# Patient Record
Sex: FEMALE | Race: WHITE | NOT HISPANIC OR LATINO | Employment: OTHER | URBAN - METROPOLITAN AREA
[De-identification: names, ages, dates, MRNs, and addresses within clinical notes are randomized per-mention and may not be internally consistent; named-entity substitution may affect disease eponyms.]

---

## 2017-02-15 ENCOUNTER — HOSPITAL ENCOUNTER (OUTPATIENT)
Dept: RADIOLOGY | Facility: MEDICAL CENTER | Age: 62
End: 2017-02-15
Attending: FAMILY MEDICINE
Payer: MEDICARE

## 2017-02-15 DIAGNOSIS — R13.10 DYSPHAGIA, UNSPECIFIED TYPE: ICD-10-CM

## 2017-02-15 PROCEDURE — 92611 MOTION FLUOROSCOPY/SWALLOW: CPT

## 2017-02-15 PROCEDURE — G8997 SWALLOW GOAL STATUS: HCPCS | Mod: CH

## 2017-02-15 PROCEDURE — 74230 X-RAY XM SWLNG FUNCJ C+: CPT

## 2017-02-15 PROCEDURE — G8998 SWALLOW D/C STATUS: HCPCS | Mod: CH

## 2017-02-15 PROCEDURE — G8996 SWALLOW CURRENT STATUS: HCPCS | Mod: CH

## 2017-02-28 ENCOUNTER — HOSPITAL ENCOUNTER (OUTPATIENT)
Dept: LAB | Facility: MEDICAL CENTER | Age: 62
End: 2017-02-28
Attending: FAMILY MEDICINE
Payer: MEDICARE

## 2017-02-28 LAB
25(OH)D3 SERPL-MCNC: 16 NG/ML (ref 30–100)
ALBUMIN SERPL BCP-MCNC: 3.9 G/DL (ref 3.2–4.9)
ALBUMIN/GLOB SERPL: 1.1 G/DL
ALP SERPL-CCNC: 90 U/L (ref 30–99)
ALT SERPL-CCNC: 18 U/L (ref 2–50)
ANION GAP SERPL CALC-SCNC: 7 MMOL/L (ref 0–11.9)
AST SERPL-CCNC: 11 U/L (ref 12–45)
BASOPHILS # BLD AUTO: 0.8 % (ref 0–1.8)
BASOPHILS # BLD: 0.06 K/UL (ref 0–0.12)
BILIRUB SERPL-MCNC: 0.4 MG/DL (ref 0.1–1.5)
BUN SERPL-MCNC: 10 MG/DL (ref 8–22)
CALCIUM SERPL-MCNC: 9.3 MG/DL (ref 8.4–10.2)
CHLORIDE SERPL-SCNC: 104 MMOL/L (ref 96–112)
CHOLEST SERPL-MCNC: 222 MG/DL (ref 100–199)
CO2 SERPL-SCNC: 29 MMOL/L (ref 20–33)
CREAT SERPL-MCNC: 0.85 MG/DL (ref 0.5–1.4)
EOSINOPHIL # BLD AUTO: 0.23 K/UL (ref 0–0.51)
EOSINOPHIL NFR BLD: 2.9 % (ref 0–6.9)
ERYTHROCYTE [DISTWIDTH] IN BLOOD BY AUTOMATED COUNT: 44.6 FL (ref 35.9–50)
GFR SERPL CREATININE-BSD FRML MDRD: >60 ML/MIN/1.73 M 2
GLOBULIN SER CALC-MCNC: 3.6 G/DL (ref 1.9–3.5)
GLUCOSE SERPL-MCNC: 127 MG/DL (ref 65–99)
HCT VFR BLD AUTO: 46.2 % (ref 37–47)
HDLC SERPL-MCNC: 46 MG/DL
HGB BLD-MCNC: 15.2 G/DL (ref 12–16)
IMM GRANULOCYTES # BLD AUTO: 0.02 K/UL (ref 0–0.11)
IMM GRANULOCYTES NFR BLD AUTO: 0.3 % (ref 0–0.9)
LDLC SERPL CALC-MCNC: 142 MG/DL
LYMPHOCYTES # BLD AUTO: 2.09 K/UL (ref 1–4.8)
LYMPHOCYTES NFR BLD: 26.4 % (ref 22–41)
MCH RBC QN AUTO: 30.2 PG (ref 27–33)
MCHC RBC AUTO-ENTMCNC: 32.9 G/DL (ref 33.6–35)
MCV RBC AUTO: 91.7 FL (ref 81.4–97.8)
MONOCYTES # BLD AUTO: 0.4 K/UL (ref 0–0.85)
MONOCYTES NFR BLD AUTO: 5.1 % (ref 0–13.4)
NEUTROPHILS # BLD AUTO: 5.11 K/UL (ref 2–7.15)
NEUTROPHILS NFR BLD: 64.5 % (ref 44–72)
NRBC # BLD AUTO: 0 K/UL
NRBC BLD AUTO-RTO: 0 /100 WBC
PLATELET # BLD AUTO: 308 K/UL (ref 164–446)
PMV BLD AUTO: 9.5 FL (ref 9–12.9)
POTASSIUM SERPL-SCNC: 4.3 MMOL/L (ref 3.6–5.5)
PROT SERPL-MCNC: 7.5 G/DL (ref 6–8.2)
RBC # BLD AUTO: 5.04 M/UL (ref 4.2–5.4)
SODIUM SERPL-SCNC: 140 MMOL/L (ref 135–145)
T3FREE SERPL-MCNC: 3.34 PG/ML (ref 2.4–4.2)
T4 FREE SERPL-MCNC: 1.41 NG/DL (ref 0.58–1.64)
TRIGL SERPL-MCNC: 168 MG/DL (ref 0–149)
TSH SERPL DL<=0.005 MIU/L-ACNC: 0.22 UIU/ML (ref 0.35–5.5)
WBC # BLD AUTO: 7.9 K/UL (ref 4.8–10.8)

## 2017-02-28 PROCEDURE — 84439 ASSAY OF FREE THYROXINE: CPT

## 2017-02-28 PROCEDURE — 82306 VITAMIN D 25 HYDROXY: CPT | Mod: GA

## 2017-02-28 PROCEDURE — 84443 ASSAY THYROID STIM HORMONE: CPT

## 2017-02-28 PROCEDURE — 80061 LIPID PANEL: CPT

## 2017-02-28 PROCEDURE — 85025 COMPLETE CBC W/AUTO DIFF WBC: CPT

## 2017-02-28 PROCEDURE — 84481 FREE ASSAY (FT-3): CPT

## 2017-02-28 PROCEDURE — 83036 HEMOGLOBIN GLYCOSYLATED A1C: CPT

## 2017-02-28 PROCEDURE — 80053 COMPREHEN METABOLIC PANEL: CPT

## 2017-02-28 PROCEDURE — 36415 COLL VENOUS BLD VENIPUNCTURE: CPT

## 2017-03-01 LAB
EST. AVERAGE GLUCOSE BLD GHB EST-MCNC: 123 MG/DL
HBA1C MFR BLD: 5.9 % (ref 0–5.6)

## 2017-07-06 PROBLEM — E03.9 HYPOTHYROIDISM: Status: ACTIVE | Noted: 2017-07-06

## 2017-07-06 PROBLEM — E78.5 DYSLIPIDEMIA: Status: ACTIVE | Noted: 2017-07-06

## 2017-07-06 PROBLEM — G35 MS (MULTIPLE SCLEROSIS) (HCC): Status: ACTIVE | Noted: 2017-07-06

## 2017-07-06 PROBLEM — E55.9 VITAMIN D DEFICIENCY: Status: ACTIVE | Noted: 2017-07-06

## 2017-09-25 ENCOUNTER — OFFICE VISIT (OUTPATIENT)
Dept: NEUROLOGY | Facility: MEDICAL CENTER | Age: 62
End: 2017-09-25
Payer: MEDICARE

## 2017-09-25 VITALS
HEART RATE: 95 BPM | RESPIRATION RATE: 16 BRPM | HEIGHT: 63 IN | SYSTOLIC BLOOD PRESSURE: 124 MMHG | BODY MASS INDEX: 51.91 KG/M2 | TEMPERATURE: 97.3 F | OXYGEN SATURATION: 100 % | DIASTOLIC BLOOD PRESSURE: 86 MMHG | WEIGHT: 293 LBS

## 2017-09-25 DIAGNOSIS — R25.2 SPASTICITY: ICD-10-CM

## 2017-09-25 DIAGNOSIS — R26.81 GAIT INSTABILITY: ICD-10-CM

## 2017-09-25 DIAGNOSIS — G35 MULTIPLE SCLEROSIS (HCC): ICD-10-CM

## 2017-09-25 DIAGNOSIS — G89.29 OTHER CHRONIC PAIN: ICD-10-CM

## 2017-09-25 PROBLEM — N31.9 NEUROGENIC BLADDER: Status: ACTIVE | Noted: 2017-09-25

## 2017-09-25 PROCEDURE — 99205 OFFICE O/P NEW HI 60 MIN: CPT | Performed by: PSYCHIATRY & NEUROLOGY

## 2017-09-25 RX ORDER — METOPROLOL SUCCINATE 25 MG/1
TABLET, EXTENDED RELEASE ORAL
COMMUNITY
Start: 2017-09-18 | End: 2018-05-02

## 2017-09-25 RX ORDER — OXYCODONE AND ACETAMINOPHEN 10; 325 MG/1; MG/1
1-2 TABLET ORAL EVERY 4 HOURS PRN
Qty: 28 TAB | Refills: 0 | Status: SHIPPED | OUTPATIENT
Start: 2017-09-25 | End: 2017-10-23 | Stop reason: SDUPTHER

## 2017-09-25 RX ORDER — DIAZEPAM 10 MG/1
10 TABLET ORAL 2 TIMES DAILY PRN
Qty: 60 TAB | Refills: 1 | Status: SHIPPED | OUTPATIENT
Start: 2017-09-25 | End: 2018-01-18 | Stop reason: SDUPTHER

## 2017-09-25 RX ORDER — DIAZEPAM 10 MG/1
10 TABLET ORAL 2 TIMES DAILY PRN
Refills: 1 | COMMUNITY
Start: 2017-09-12 | End: 2017-09-25 | Stop reason: SDUPTHER

## 2017-09-25 RX ORDER — LEVOTHYROXINE SODIUM 0.15 MG/1
TABLET ORAL
Refills: 6 | COMMUNITY
Start: 2017-09-10 | End: 2019-01-31

## 2017-09-25 RX ORDER — ATORVASTATIN CALCIUM 10 MG/1
TABLET, FILM COATED ORAL
Refills: 5 | COMMUNITY
Start: 2017-09-10 | End: 2018-05-02

## 2017-09-25 RX ORDER — CARISOPRODOL 350 MG/1
TABLET ORAL
Refills: 0 | COMMUNITY
Start: 2017-08-13 | End: 2017-09-25 | Stop reason: SDUPTHER

## 2017-09-25 RX ORDER — CARISOPRODOL 350 MG/1
TABLET ORAL
Qty: 30 TAB | Refills: 0 | Status: SHIPPED | OUTPATIENT
Start: 2017-09-25 | End: 2017-10-23 | Stop reason: SDUPTHER

## 2017-09-25 ASSESSMENT — PATIENT HEALTH QUESTIONNAIRE - PHQ9
5. POOR APPETITE OR OVEREATING: 3 - NEARLY EVERY DAY
CLINICAL INTERPRETATION OF PHQ2 SCORE: 6
SUM OF ALL RESPONSES TO PHQ QUESTIONS 1-9: 24

## 2017-09-25 NOTE — PROGRESS NOTES
"CC: Multiple Sclerosis       HPI:    Ms. Richardson is a 61-year-old woman with multiple sclerosis who presents today for initial consultation and to establish care. She was referred by her primary care physician Dr. Luci Aguilera.    In 1991, Ms. Richardson tells me she was living in [facility] when she started having double vision. She went to her PCP at that time who ordered an MRI which showed plaques in her brain concerning for multiple sclerosis. She did not follow up with a neurologist until 1995 when experienced an episode of transverse myelitis. She went numb from the armpits down, had bladder and bowel problems, and became paraplegic. She was hospitalized for 2 weeks and was treated by Dr. Dixon James with oral prednisone. She was told she would never walk again, but she did regain the use of her legs. She had additional relapses over the ensuing years, and in 1999, she moved to [place], [place]. Her neurologist there repeated MRI's, establishing again her diagnosis of MS, and started her on Betaseron. The injections were extremely painful for her and she also had suicidal ideations. She ultimately tried Avonex and Copaxone as well and was unable to tolerate these. She also tells me that she had depressive effects from several symptomatic medications. For example asked within meet her extremely dizzy which compounded an episode of vertigo that she had.    She moved to [place], [place], in 2011 after retiring and also to be closer to her parents. She had a bad exacerbation shortly after moving, and started seeing a PA. She tells me the exacerbation felt like she had a \"rock in her rectum,\" similar to what it felt like during her episode in 1995. She was also having severe shivering. She could not walk, but her symptoms improved after 2 weeks. She did not have medical insurance at the time. Her PA prescribed her Ritalin which helped with cognitive issues. They also treated her for an episode of pneumonia earlier this " year.     Previously she had pins and needles residual from her transverse myelitis in her arms and legs. This has transformed into a searing burning sensation which is constant, present every day, and seems to be untouched by medications that she has tried in the past. She has been tried on a variety of medications including antidepressants which gave her suicidal ideations, and anti-epileptic medications which were ineffective. She had been medicating herself with occasional Percocet which would take the edge off. She also continues to experience significant spasticity particularly around her rib cage. She tried smoking marijuana after someone had encouraged her to do so but she experienced some bad side effects after the 3rd or 4th time and so she has not tried it again.    She is here today because she feels as though she's been getting worse with new neurological symptoms. She has constant double vision and has noticed cognitive changes. Sometimes she feels like she is hearing what her  is saying backwards. She has had jumbled speech. She has also been having swallowing issues and had a swallow study that was normal. She has choked twice, and her  had to perform the Heimlich maneuver.    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 Smoked about a pack a day x 10 years. No alcohol.     PMHx: Graves' s/p radioactive iodone ablation.     PSHx: Hysterectomy in 2008 due to menorrhagia. Cholecystectomy. Tonsillectomy.     Family Hx: 41 yo Daughter has MS. 34 yo daughter has fibromyalgia. Mom and dad both healthy. Two brother both healthy.     MS History:  Diagnosed: 1995  First presentation: 1991 Double Vision  Disease modifying treatments: Betaseron (had suicidal ideations). Copaxone. Avonex.   Former or other neurologist:   Last attack: Recently  Last MRI:  Unsure - [place], [place]      ROS:   Constitutional: No fevers or chills.  Eyes: No blurry vision or  eye pain.  ENT: + dysphagia, no hearing loss.  Respiratory: No cough or shortness of breath.  Cardiovascular: No chest pain or palpitations.  GI: No nausea, vomiting, or diarrhea.  : No urinary incontinence or dysuria.  Musculoskeletal: No joint swelling or arthralgias.  Skin: No skin rashes.  Neuro: No headaches, dizziness, or tremors.  Endocrine: No heat or cold intolerance. No polydipsia or polyuria.  Psych: No depression or anxiety.  Heme/Lymph: No easy bruising or swollen lymph nodes.  All other review of systems were reviewed and were negative.     Social History:   Social History     Social History   • Marital status:      Spouse name: N/A   • Number of children: N/A   • Years of education: N/A     Occupational History   • Not on file.     Social History Main Topics   • Smoking status: Former Smoker     Years: 15.00     Quit date: 2014   • Smokeless tobacco: Never Used   • Alcohol use No   • Drug use: No   • Sexual activity: Not on file     Other Topics Concern   • Not on file     Social History Narrative   • No narrative on file       Family Hx:   Family History   Problem Relation Age of Onset   • Autoimmune Disease Daughter      MS   • Other Daughter      fibromyalgia       Current Medications:     Current Outpatient Prescriptions:   •  levothyroxine (SYNTHROID) 150 MCG Tab, TAKE 1 TABLET(S) BY MOUTH DAILY, Disp: , Rfl: 6  •  diazepam (VALIUM) 10 MG tablet, Take 1 Tab by mouth 2 times a day as needed., Disp: 60 Tab, Rfl: 1  •  carisoprodol (SOMA) 350 MG Tab, TAKE 1 TABLET BY MOUTH TWICE A DAY AS NEEDED FOR SPASM, Disp: 30 Tab, Rfl: 0  •  oxycodone-acetaminophen (PERCOCET-10)  MG Tab, Take 1-2 Tabs by mouth every four hours as needed for Severe Pain., Disp: 28 Tab, Rfl: 0  •  atorvastatin (LIPITOR) 10 MG Tab, TAKE 1 TABLET ORALLY EVERY EVENING FOR CHOLESTEROL, Disp: , Rfl: 5  •  metoprolol SR (TOPROL XL) 25 MG TABLET SR 24 HR, , Disp: , Rfl:   •  metoprolol (LOPRESSOR) 25 MG Tab, Take 25 mg by  "mouth 2 times a day., Disp: , Rfl: 3      Allergies: No Known Allergies      Physical Exam:   Ambulatory Vitals  Vitals  Blood Pressure: 124/86  Temperature: 36.3 °C (97.3 °F)  Pulse: 95  Respiration: 16  Pulse Oximetry: 100 %  Height: 160 cm (5' 3\")  Weight: (!) 135.2 kg (298 lb)  Encounter Vitals  Temperature: 36.3 °C (97.3 °F)  Temp Source: Temporal  Blood Pressure: 124/86  BP Location: Left, Forearm  Patient BP Position: Sitting  Pulse: 95  Respiration: 16  Pulse Oximetry: 100 %  Weight: (!) 135.2 kg (298 lb)  How Weight Obtained: Stand Up Scale  Height: 160 cm (5' 3\")  BMI (Calculated): 52.79      Constitutional: Well-developed, well-nourished overweight woman. Appears stated age.  Cardiovascular: RRR, with no murmurs, rubs or gallops. No carotid bruits. No peripheral edema, pedal pulses intact.   Respiratory: Lungs CTA B/L, no W/R/R.   Skin: Warm, dry, intact. No rashes observed.  Eyes:    Funduscopic: Optic discs flat with no evidence of papilledema or pallor. Normal posterior segments.  Neurologic:   Mental Status: Awake, alert, oriented x 3.   Speech: Fluent with normal prosody.   Concentration: Attentive. Able to focus on history and follow multi-step commands.   Fund of Knowledge: Appropriate.   Cranial Nerves: PERRL. EOM sadi by broken saccades. Good eye closure. Facial sensation decreased on the left compared to the right. Palate elevates symmetrically. Tongue midline. Symmetric shoulder shrug.   Sensory: Decreased vibration to the level of the upper extremities bilaterally. Temperature decreased on the left compared to the right.   Coordination: No evidence of past-pointing on finger to nose testing.   DTR's: 3+ in bilateral biceps, triceps. Unable to elicit in right knee (prior arthroplasty), 1+ in left knee, 1+ in ankles bilaterally.    Babinski: Toes mute bilaterally.   Romberg: Untested - patient too unsteady.    Movements: No tremors observed.   Musculoskeletal:    Strength: Right hip flexor 2/5, " left hip flexor 3/5. Right knee flexor/extensor 3/5, left 2/5. Left foot drop. Right ankle DF/PF 4/5.    Gait: Broad based and unsteady. Had difficulty transferring from chair to exam table.   Tone: Normal bulk and tone.   Joints: No swelling.       Imaging:   None available for review at today's visit.    Multiple sclerosis ([facility])  Mrs. Richardson's clinical history is consistent with multiple sclerosis. She has had what sounds like clinical attacks in her brainstem as well as at least one episode of transverse myelitis. She did not have MRI imaging available for review today. Given the recent worsening of her symptoms I would like to repeat her imaging of the brain, cervical and thoracic spines with and without contrast. This will help us confirm her diagnosis and also give some indication of the extent of her recent disease activity. This will help us inform her treatment choices.    Plan:  1. MRI of the brain, cervical, and thoracic spines with and without contrast  2. She will obtain a copy of her previous MRI from [facility] for review and comparison.    Gait instability  Ms. Richardson has had a significant worsening in her walking ability over the last several years. She has had multiple falls and her  has stepped in to assist her with several of her activities of daily living including bathing and dressing. She lives in a one-story house which has 3 steps to get up into the house. She has grab bars in her shower, but is unable to shower on her own because she is so unsteady. She has refused to use assistive devices in the past although she tried walking sticks but felt her coordination with the sticks was more dangerous than walking without them. Today we discussed using a rollator walker for stability and she agreed to try this. Her lack of mobility has also restricted her movement from house which is also had an impact on her psychosocial well-being. I would like her to also have a transport  chair which she can use when she goes out in public. At a future visit I would like to discuss starting Ampyra.    Plan:  1. Prescription given for Rollator walker  2. Prescription given for transport wheelchair  3. Social work consult placed for home safety evaluation. I would like her physical house to be evaluated and assessed for potential improvements that could minimize her fall risk.    Chronic pain  We had a long discussion about her chronic neuropathic pain which is residual from her transverse myelitis. She's tried many different medications all of which give her unwanted side effects. Antidepressant medications give her suicidal ideations. She has never had suicidal ideations off of these medications. She has also been tried on many antiepileptic medications which also have given her unwanted side effects. She requested a limited supply of Percocet, which I agreed to as a 1 time Bridge to her next visit at which we will further discuss treatment options versus pain management referral.    Plan:  1. Percocet 10/325 #28 one time prescription given at today's visit.    Spasticity  We discussed the patient's spasticity at length. Baclofen made her extremely dizzy. She has been on Soma for a number of years and would like to stay on this medication for the time being. I agreed to take over her prescription of this muscle relaxant as well as her Valium. Prescription refills were given for each.    Plan:  1. Soma #30 prescribed at today's visit. She takes 2 per day so we may have to refill this at her next visit.   2. Valium 10 mg twice a day #60 prescribed at today's visit.          Follow up      Greater than 50% of this 60 minute face to face encounter was devoted to disease state counseling on Multiple Sclerosis and coordination of care. During today's encounter we discussed at length her symptom management. We also addressed at length her recent worsening in walking, frequent falls, strategies for reducing  falls, and assistive devices which were prescribed at today's visit (see above assessment and plan for further details of discussion).

## 2017-09-26 ENCOUNTER — PATIENT OUTREACH (OUTPATIENT)
Dept: HEALTH INFORMATION MANAGEMENT | Facility: OTHER | Age: 62
End: 2017-09-26

## 2017-09-26 ENCOUNTER — TELEPHONE (OUTPATIENT)
Dept: HEALTH INFORMATION MANAGEMENT | Facility: OTHER | Age: 62
End: 2017-09-26

## 2017-09-26 NOTE — ASSESSMENT & PLAN NOTE
We discussed the patient's spasticity at length. Baclofen made her extremely dizzy. She has been on Soma for a number of years and would like to stay on this medication for the time being. I agreed to take over her prescription of this muscle relaxant as well as her Valium. Prescription refills were given for each.    Plan:  1. Soma #30 prescribed at today's visit. She takes 2 per day so we may have to refill this at her next visit.   2. Valium 10 mg twice a day #60 prescribed at today's visit.

## 2017-09-26 NOTE — ASSESSMENT & PLAN NOTE
Ms. Richardson has had a significant worsening in her walking ability over the last several years. She has had multiple falls and her  has stepped in to assist her with several of her activities of daily living including bathing and dressing. She lives in a one-story house which has 3 steps to get up into the house. She has grab bars in her shower, but is unable to shower on her own because she is so unsteady. She has refused to use assistive devices in the past although she tried walking sticks but felt her coordination with the sticks was more dangerous than walking without them. Today we discussed using a rollator walker for stability and she agreed to try this. Her lack of mobility has also restricted her movement from house which is also had an impact on her psychosocial well-being. I would like her to also have a transport chair which she can use when she goes out in public. At a future visit I would like to discuss starting Ampyra.    Plan:  1. Prescription given for Rollator walker  2. Prescription given for transport wheelchair  3. Social work consult placed for home safety evaluation. I would like her physical house to be evaluated and assessed for potential improvements that could minimize her fall risk.

## 2017-09-26 NOTE — TELEPHONE ENCOUNTER
Dr. Mejias,     Thank you for your referral to Care Coordination. At this time, we are not in contract with her insurance to follow her care. A letter was sent to her with a list of resources for community programs that can assist. Do you believe that this patient would benefit from being placed on Home Health? They would be able to do an evaluation of her home and be able to assist her in the home as well. Please do not hesitate to call if you have questions.     Kerry Robbins MA   Care Coordination  [phone]

## 2017-09-26 NOTE — ASSESSMENT & PLAN NOTE
We had a long discussion about her chronic neuropathic pain which is residual from her transverse myelitis. She's tried many different medications all of which give her unwanted side effects. Antidepressant medications give her suicidal ideations. She has never had suicidal ideations off of these medications. She has also been tried on many antiepileptic medications which also have given her unwanted side effects. She requested a limited supply of Percocet, which I agreed to as a 1 time Bridge to her next visit at which we will further discuss treatment options versus pain management referral.    Plan:  1. Percocet 10/325 #28 one time prescription given at today's visit.

## 2017-09-26 NOTE — ASSESSMENT & PLAN NOTE
Mrs. Richardson's clinical history is consistent with multiple sclerosis. She has had what sounds like clinical attacks in her brainstem as well as at least one episode of transverse myelitis. She did not have MRI imaging available for review today. Given the recent worsening of her symptoms I would like to repeat her imaging of the brain, cervical and thoracic spines with and without contrast. This will help us confirm her diagnosis and also give some indication of the extent of her recent disease activity. This will help us inform her treatment choices.    Plan:  1. MRI of the brain, cervical, and thoracic spines with and without contrast  2. She will obtain a copy of her previous MRI from [facility] [place] for review and comparison.

## 2017-10-06 ENCOUNTER — HOSPITAL ENCOUNTER (OUTPATIENT)
Dept: RADIOLOGY | Facility: MEDICAL CENTER | Age: 62
End: 2017-10-06
Attending: PSYCHIATRY & NEUROLOGY
Payer: MEDICARE

## 2017-10-06 DIAGNOSIS — G35 MULTIPLE SCLEROSIS (HCC): ICD-10-CM

## 2017-10-06 PROCEDURE — A9579 GAD-BASE MR CONTRAST NOS,1ML: HCPCS | Performed by: PSYCHIATRY & NEUROLOGY

## 2017-10-06 PROCEDURE — 70553 MRI BRAIN STEM W/O & W/DYE: CPT

## 2017-10-06 PROCEDURE — 700117 HCHG RX CONTRAST REV CODE 255: Performed by: PSYCHIATRY & NEUROLOGY

## 2017-10-06 PROCEDURE — 72157 MRI CHEST SPINE W/O & W/DYE: CPT

## 2017-10-06 PROCEDURE — 72156 MRI NECK SPINE W/O & W/DYE: CPT

## 2017-10-06 RX ADMIN — GADODIAMIDE 20 ML: 287 INJECTION INTRAVENOUS at 17:00

## 2017-10-23 ENCOUNTER — OFFICE VISIT (OUTPATIENT)
Dept: NEUROLOGY | Facility: MEDICAL CENTER | Age: 62
End: 2017-10-23
Payer: MEDICARE

## 2017-10-23 VITALS
DIASTOLIC BLOOD PRESSURE: 82 MMHG | RESPIRATION RATE: 16 BRPM | SYSTOLIC BLOOD PRESSURE: 126 MMHG | OXYGEN SATURATION: 94 % | HEIGHT: 63 IN | BODY MASS INDEX: 51.91 KG/M2 | WEIGHT: 293 LBS | HEART RATE: 96 BPM | TEMPERATURE: 98.4 F

## 2017-10-23 DIAGNOSIS — G35 MULTIPLE SCLEROSIS (HCC): ICD-10-CM

## 2017-10-23 DIAGNOSIS — R25.2 SPASTICITY: ICD-10-CM

## 2017-10-23 DIAGNOSIS — Z91.030 ALLERGY TO BEE STING: ICD-10-CM

## 2017-10-23 DIAGNOSIS — G89.29 OTHER CHRONIC PAIN: ICD-10-CM

## 2017-10-23 PROCEDURE — 99215 OFFICE O/P EST HI 40 MIN: CPT | Performed by: PSYCHIATRY & NEUROLOGY

## 2017-10-23 RX ORDER — OXYCODONE AND ACETAMINOPHEN 10; 325 MG/1; MG/1
1-2 TABLET ORAL EVERY 4 HOURS PRN
Qty: 28 TAB | Refills: 0 | Status: SHIPPED | OUTPATIENT
Start: 2017-10-23 | End: 2017-12-18 | Stop reason: SDUPTHER

## 2017-10-23 RX ORDER — CARISOPRODOL 350 MG/1
TABLET ORAL
Qty: 30 TAB | Refills: 0 | Status: SHIPPED | OUTPATIENT
Start: 2017-10-23 | End: 2018-01-18 | Stop reason: SDUPTHER

## 2017-10-23 RX ORDER — EPINEPHRINE 0.3 MG/.3ML
0.3 INJECTION SUBCUTANEOUS ONCE
Qty: 0.3 ML | Refills: 0 | Status: SHIPPED | OUTPATIENT
Start: 2017-10-23 | End: 2017-10-23

## 2017-10-23 ASSESSMENT — PATIENT HEALTH QUESTIONNAIRE - PHQ9
5. POOR APPETITE OR OVEREATING: 3 - NEARLY EVERY DAY
SUM OF ALL RESPONSES TO PHQ QUESTIONS 1-9: 20
CLINICAL INTERPRETATION OF PHQ2 SCORE: 5

## 2017-10-23 NOTE — PROGRESS NOTES
CC: Multiple Sclerosis       HPI:    Ms. Richardson is a 62-year-old woman with hypothyroidism, hypertension, hyperlipidemia and multiple sclerosis who returns today for follow-up care. She was last seen in our initial encounter on September 25, 2017.     Miss Richardson tells me that her symptoms have remained about the same since our last visit. She continues to have difficulty walking, and never obtained the walker or wheelchair which I had prescribed at her last visit. We discussed the importance of fall prevention and the fact that a fracture and its subsequent recovery could be particularly devastating. She continues to experience chronic pain for which she takes Percocet. For spasticity she takes Soma and occasional Valium.      MS History:  Diagnosed: 1995  First presentation: 1991 [facility]  Disease modifying treatments: Betaseron (had suicidal ideations). Copaxone. Avonex.   Former or other neurologist:   Last attack: Recently  Last MRI:  10/6/17 brain, cervical, thoracic w/wo      ROS:   Constitutional: No fevers or chills.  Eyes: No blurry vision or eye pain.  ENT: No dysphagia or hearing loss.  Respiratory: No cough or shortness of breath.  Cardiovascular: No chest pain or palpitations.  GI: No nausea, vomiting, or diarrhea.  : No urinary incontinence or dysuria.  Musculoskeletal: No joint swelling or arthralgias.  Skin: No skin rashes.  Neuro: No headaches, dizziness, or tremors.  Endocrine: No heat or cold intolerance. No polydipsia or polyuria.  Psych: No depression or anxiety.  Heme/Lymph: No easy bruising or swollen lymph nodes.  All other review of systems were reviewed and were negative.     Past Medical History:   Past Medical History:   Diagnosis Date   • Graves disease    • Multiple sclerosis (CMS-[facility])        Past Surgical History: History reviewed. No pertinent surgical history.    Social History:   Social History     Social History   • Marital status:      Spouse name: N/A   • Number of  "children: N/A   • Years of education: N/A     Occupational History   • Not on file.     Social History Main Topics   • Smoking status: Former Smoker     Years: 15.00     Quit date: 2014   • Smokeless tobacco: Never Used   • Alcohol use No   • Drug use: No   • Sexual activity: Not on file     Other Topics Concern   • Not on file     Social History Narrative   • No narrative on file       Family Hx:   Family History   Problem Relation Age of Onset   • Autoimmune Disease Daughter      MS   • Other Daughter      fibromyalgia       Current Medications:     Current Outpatient Prescriptions:   •  oxycodone-acetaminophen (PERCOCET-10)  MG Tab, Take 1-2 Tabs by mouth every four hours as needed for Severe Pain., Disp: 28 Tab, Rfl: 0  •  carisoprodol (SOMA) 350 MG Tab, TAKE 1 TABLET BY MOUTH TWICE A DAY AS NEEDED FOR SPASM, Disp: 30 Tab, Rfl: 0  •  levothyroxine (SYNTHROID) 150 MCG Tab, TAKE 1 TABLET(S) BY MOUTH DAILY, Disp: , Rfl: 6  •  diazepam (VALIUM) 10 MG tablet, Take 1 Tab by mouth 2 times a day as needed., Disp: 60 Tab, Rfl: 1  •  atorvastatin (LIPITOR) 10 MG Tab, TAKE 1 TABLET ORALLY EVERY EVENING FOR CHOLESTEROL, Disp: , Rfl: 5  •  metoprolol SR (TOPROL XL) 25 MG TABLET SR 24 HR, , Disp: , Rfl:   •  metoprolol (LOPRESSOR) 25 MG Tab, Take 25 mg by mouth 2 times a day., Disp: , Rfl: 3      Allergies:   Allergies   Allergen Reactions   • Bee Venom          Physical Exam:   Ambulatory Vitals  Vitals  Blood Pressure: 126/82  Temperature: 36.9 °C (98.4 °F)  Pulse: 96  Respiration: 16  Pulse Oximetry: 94 %  Height: 160 cm (5' 3\")  Weight: (!) 135.2 kg (298 lb)  Encounter Vitals  Temperature: 36.9 °C (98.4 °F)  Temp Source: Temporal  Blood Pressure: 126/82  BP Location: Left, Upper Arm  Patient BP Position: Standing  Pulse: 96  Respiration: 16  Pulse Oximetry: 94 %  Weight: (!) 135.2 kg (298 lb)  How Weight Obtained: Stand Up Scale  Height: 160 cm (5' 3\")  BMI (Calculated): 52.79      Constitutional: Well-developed, " well-nourished overweight woman. Appears stated age.  Cardiovascular: RRR, with no murmurs, rubs or gallops. No carotid bruits. No peripheral edema, pedal pulses intact.   Respiratory: Lungs CTA B/L, no W/R/R.   Skin: Warm, dry, intact. No rashes observed.  Eyes:                         Funduscopic: Optic discs flat with no evidence of papilledema or pallor. Normal posterior segments.  Neurologic:                        Mental Status: Awake, alert, oriented x 3.                        Speech: Fluent with normal prosody.                        Concentration: Attentive. Able to focus on history and follow multi-step commands.                        Fund of Knowledge: Appropriate.                        Cranial Nerves: PERRL. EOM sadi by broken saccades. Good eye closure. Facial sensation decreased on the left compared to the right. Palate elevates symmetrically. Tongue midline. Symmetric shoulder shrug.                        Sensory: Decreased vibration to the level of the upper extremities bilaterally. Temperature decreased on the left compared to the right.                        Coordination: No evidence of past-pointing on finger to nose testing.                        DTR's: 3+ in bilateral biceps, triceps. Unable to elicit in right knee (prior arthroplasty), 1+ in left knee, 1+ in ankles bilaterally.                         Babinski: Toes mute bilaterally.                        Romberg: Untested - patient too unsteady.                         Movements: No tremors observed.   Musculoskeletal:                         Strength: Right hip flexor 2/5, left hip flexor 3/5. Right knee flexor/extensor 3/5, left 2/5. Left foot drop. Right ankle DF/PF 4/5.                         Gait: Broad based and unsteady. Had difficulty transferring from chair to exam table.                        Tone: Normal bulk and tone.                        Joints: No swelling.     Labs:  2/28/17 Hgb A1c 5.9, TSH 0.220, Total cholesterol  "222, Trig 168, HDL 46, .     Imaging:   Today I reviewed Mrs. Richardson's recent brain, cervical, and thoracic spine MRI imaging with her in the examination room. I verbalized my interpretation and assessment and the patient was able to ask questions.    MRI brain w/wo 10/6/17  1.  Supratentorial white matter lesions with lesion morphology compatible with demyelinating disease/multiple sclerosis. No \"active\" enhancing lesions are evident.  2.  A single subcentimeter demyelinating lesion is seen in the posterior fossa in the right cerebellar hemisphere.    MRI c-spine w/wo contrast 10/6/17  1.  Degenerative disc space narrowing C5-6, C6-7.  2.  Modic type I discogenic endplate marrow edema at C6-7. This can be a pain generator.  3.  C4-5 tiny central disc bulge or protrusion with no central or foraminal stenosis.  4.  C5-6 disc/osteophyte complex greater toward the right. Mild right lateral recess stenosis. Moderate right foraminal stenosis.  5.  C6-7 small disc/osteophyte complex. No central or foraminal stenosis.  6.  No evidence of cervical spinal cord demyelinating lesion or abnormal cord enhancement at any cervical level.    MRI thoracic spine w/wo contrast 10/6/17  1.  Chronic degenerative discogenic endplate marrow signal changes at T5-T6 and T6-T7.  2.  No evidence of cervical spinal cord demyelinating disease. No enhancing lesions.  3.  No significant disc bulge or protrusion, central stenosis, or foraminal stenosis at any thoracic level.  4.  T12 hemangioma. No clinical significance.      Multiple sclerosis (CMS-[facility])  Today I reviewed Mrs. Richardson's recent MRI imaging with her. While no enhancing lesions were seen, chronic lesions in her right cerebellum and supratentorial white matter were seen in a pattern consistent with multiple sclerosis. No lesions were appreciated in her spinal cord however she does have disc disease. We discussed starting a medication for the prevention of MS relapse and I " recommended a bocce ago. Side effects of Aubagio were discussed including hepatotoxicity and leukopenia which would require monthly blood draws for the first six months, then every six months thereafter. Thinning of the hair has also been experienced in some people as well as GI side effects and a modest elevation in systolic blood pressure.     Plan:  1. Labs: CBC, VZV, hepatitis panel, Quantiferon Gold TB, Hepatic function panel.  2. If labs are WNL, will proceed with Aubagio. Patient signed consent form today.         Chronic pain  Ms. Richardson continues to experience chronic pain and spasticity. I would like her to see a Dr. Almaguer, physiatrist, to discuss a pain management plan for her and hopefully find non-narcotic treatments that would benefit her. In the mean time I have refilled her Percocet and Soma.     Plan:  1. Refer to Dr. Almaguer, physiatry  2. Percocet and Soma refilled today for 30 day supply.       Follow up in 6-8 weeks.       Greater than 50% of this 45 minute face to face encounter was devoted to disease state counseling on Multiple Sclerosis and coordination of care. During today's encounter we discussed available treatment options and their individual side effect profiles. I recommended the patient try Aubagio. Additional time was spent on MRI review with the patient in the exam room during which I provided education on basic radiology terminology and provided my own verbal assessment (see above assessment and plan for further details of discussion).

## 2017-10-25 NOTE — ASSESSMENT & PLAN NOTE
Today I reviewed Mrs. Richardson's recent MRI imaging with her. While no enhancing lesions were seen, chronic lesions in her right cerebellum and supratentorial white matter were seen in a pattern consistent with multiple sclerosis. No lesions were appreciated in her spinal cord however she does have disc disease. We discussed starting a medication for the prevention of MS relapse and I recommended a bocce ago. Side effects of Aubagio were discussed including hepatotoxicity and leukopenia which would require monthly blood draws for the first six months, then every six months thereafter. Thinning of the hair has also been experienced in some people as well as GI side effects and a modest elevation in systolic blood pressure.     Plan:  1. Labs: CBC, VZV, hepatitis panel, Quantiferon Gold TB, Hepatic function panel.  2. If labs are WNL, will proceed with Aubagio. Patient signed consent form today.

## 2017-10-25 NOTE — ASSESSMENT & PLAN NOTE
Ms. Richardson continues to experience chronic pain and spasticity. I would like her to see a Dr. Almaguer, physiatrist, to discuss a pain management plan for her and hopefully find non-narcotic treatments that would benefit her. In the mean time I have refilled her Percocet and Soma.     Plan:  1. Refer to Dr. Almaguer, physiatry  2. Percocet and Soma refilled today for 30 day supply.

## 2017-11-08 ENCOUNTER — HOSPITAL ENCOUNTER (OUTPATIENT)
Dept: LAB | Facility: MEDICAL CENTER | Age: 62
End: 2017-11-08
Attending: PSYCHIATRY & NEUROLOGY
Payer: MEDICARE

## 2017-11-08 DIAGNOSIS — G35 MULTIPLE SCLEROSIS (HCC): ICD-10-CM

## 2017-11-08 LAB
ALBUMIN SERPL BCP-MCNC: 4 G/DL (ref 3.2–4.9)
ALP SERPL-CCNC: 118 U/L (ref 30–99)
ALT SERPL-CCNC: 19 U/L (ref 2–50)
AST SERPL-CCNC: 11 U/L (ref 12–45)
BASOPHILS # BLD AUTO: 0.7 % (ref 0–1.8)
BASOPHILS # BLD: 0.07 K/UL (ref 0–0.12)
BILIRUB CONJ SERPL-MCNC: <0.1 MG/DL (ref 0.1–0.5)
BILIRUB INDIRECT SERPL-MCNC: ABNORMAL MG/DL (ref 0–1)
BILIRUB SERPL-MCNC: 0.2 MG/DL (ref 0.1–1.5)
EOSINOPHIL # BLD AUTO: 0.25 K/UL (ref 0–0.51)
EOSINOPHIL NFR BLD: 2.4 % (ref 0–6.9)
ERYTHROCYTE [DISTWIDTH] IN BLOOD BY AUTOMATED COUNT: 48.9 FL (ref 35.9–50)
HAV IGM SERPL QL IA: NEGATIVE
HBV CORE IGM SER QL: NEGATIVE
HBV SURFACE AG SER QL: NEGATIVE
HCT VFR BLD AUTO: 50.8 % (ref 37–47)
HCV AB SER QL: NEGATIVE
HGB BLD-MCNC: 15.7 G/DL (ref 12–16)
IMM GRANULOCYTES # BLD AUTO: 0.02 K/UL (ref 0–0.11)
IMM GRANULOCYTES NFR BLD AUTO: 0.2 % (ref 0–0.9)
LYMPHOCYTES # BLD AUTO: 2.08 K/UL (ref 1–4.8)
LYMPHOCYTES NFR BLD: 20.1 % (ref 22–41)
MCH RBC QN AUTO: 29.7 PG (ref 27–33)
MCHC RBC AUTO-ENTMCNC: 30.9 G/DL (ref 33.6–35)
MCV RBC AUTO: 96 FL (ref 81.4–97.8)
MONOCYTES # BLD AUTO: 0.52 K/UL (ref 0–0.85)
MONOCYTES NFR BLD AUTO: 5 % (ref 0–13.4)
NEUTROPHILS # BLD AUTO: 7.43 K/UL (ref 2–7.15)
NEUTROPHILS NFR BLD: 71.6 % (ref 44–72)
NRBC # BLD AUTO: 0 K/UL
NRBC BLD AUTO-RTO: 0 /100 WBC
PLATELET # BLD AUTO: 364 K/UL (ref 164–446)
PMV BLD AUTO: 10.4 FL (ref 9–12.9)
PROT SERPL-MCNC: 7.4 G/DL (ref 6–8.2)
RBC # BLD AUTO: 5.29 M/UL (ref 4.2–5.4)
VZV IGG SER IA-ACNC: NORMAL
WBC # BLD AUTO: 10.4 K/UL (ref 4.8–10.8)

## 2017-11-08 PROCEDURE — 36415 COLL VENOUS BLD VENIPUNCTURE: CPT | Mod: GA

## 2017-11-08 PROCEDURE — 86787 VARICELLA-ZOSTER ANTIBODY: CPT

## 2017-11-08 PROCEDURE — 86480 TB TEST CELL IMMUN MEASURE: CPT

## 2017-11-08 PROCEDURE — 85025 COMPLETE CBC W/AUTO DIFF WBC: CPT

## 2017-11-08 PROCEDURE — 80076 HEPATIC FUNCTION PANEL: CPT

## 2017-11-08 PROCEDURE — 80074 ACUTE HEPATITIS PANEL: CPT | Mod: GA

## 2017-11-10 LAB
M TB TUBERC IFN-G BLD QL: NEGATIVE
M TB TUBERC IFN-G/MITOGEN IGNF BLD: -0
M TB TUBERC IGNF/MITOGEN IGNF CONTROL: 38.63 [IU]/ML
MITOGEN IGNF BCKGRD COR BLD-ACNC: 0.02 [IU]/ML

## 2017-12-18 ENCOUNTER — OFFICE VISIT (OUTPATIENT)
Dept: NEUROLOGY | Facility: MEDICAL CENTER | Age: 62
End: 2017-12-18
Payer: MEDICARE

## 2017-12-18 VITALS
HEIGHT: 63 IN | DIASTOLIC BLOOD PRESSURE: 84 MMHG | WEIGHT: 293 LBS | HEART RATE: 64 BPM | RESPIRATION RATE: 18 BRPM | TEMPERATURE: 97.4 F | BODY MASS INDEX: 51.91 KG/M2 | OXYGEN SATURATION: 92 % | SYSTOLIC BLOOD PRESSURE: 118 MMHG

## 2017-12-18 DIAGNOSIS — M54.50 CHRONIC BILATERAL LOW BACK PAIN WITHOUT SCIATICA: ICD-10-CM

## 2017-12-18 DIAGNOSIS — E03.9 HYPOTHYROIDISM, UNSPECIFIED TYPE: ICD-10-CM

## 2017-12-18 DIAGNOSIS — G35 MULTIPLE SCLEROSIS (HCC): ICD-10-CM

## 2017-12-18 DIAGNOSIS — G89.29 CHRONIC BILATERAL LOW BACK PAIN WITHOUT SCIATICA: ICD-10-CM

## 2017-12-18 DIAGNOSIS — G89.29 OTHER CHRONIC PAIN: ICD-10-CM

## 2017-12-18 DIAGNOSIS — R25.2 SPASTICITY: ICD-10-CM

## 2017-12-18 PROCEDURE — 99215 OFFICE O/P EST HI 40 MIN: CPT | Performed by: PSYCHIATRY & NEUROLOGY

## 2017-12-18 RX ORDER — BACLOFEN 10 MG/1
10 TABLET ORAL 3 TIMES DAILY
Qty: 30 TAB | Refills: 3 | Status: SHIPPED | OUTPATIENT
Start: 2017-12-18 | End: 2018-05-02

## 2017-12-18 RX ORDER — OXYCODONE AND ACETAMINOPHEN 10; 325 MG/1; MG/1
1-2 TABLET ORAL EVERY 4 HOURS PRN
Qty: 28 TAB | Refills: 0 | Status: SHIPPED | OUTPATIENT
Start: 2017-12-18 | End: 2018-01-17

## 2017-12-18 RX ORDER — CARISOPRODOL 350 MG/1
350 TABLET ORAL 4 TIMES DAILY
Qty: 120 TAB | Refills: 0 | Status: SHIPPED | OUTPATIENT
Start: 2017-12-18 | End: 2018-01-17

## 2017-12-18 ASSESSMENT — PATIENT HEALTH QUESTIONNAIRE - PHQ9
5. POOR APPETITE OR OVEREATING: 3 - NEARLY EVERY DAY
SUM OF ALL RESPONSES TO PHQ QUESTIONS 1-9: 21
CLINICAL INTERPRETATION OF PHQ2 SCORE: 6

## 2017-12-18 NOTE — PROGRESS NOTES
CC: Multiple Sclerosis       HPI:    Ms. Richardson is a 63 yo F with multiple sclerosis who presents today for follow upcare. She is accompanied by her  to today's visit. She had received the Aubagio in the mail, but does not wish to start it because she is afraid of the side effects.      She tells me that she has had persistent neuropathy symptoms in her arms and legs and that she ran out of Percocet. She states that although she had an appointment with Dr. Almaguer, pain management specialist, who we agreed would take over the management of her pain issues, she did not go to the appointment because, she states, she was sick, and could not tolerate the car ride to the appointment.         Her  was vocal at today's visit regarding his concern that his wife is in pain which is currently untreated and he seemed upset that she ran out of her narcotic pain medications, but, again, I pointed out, that she did not go to the appointment we had arranged with the pain specialist, who may be able to help her.     She described several types of pain. She has chronic low back pain, but has not had a lumbar spine MRI in many years, if ever. She also experiences an intense full body pain that feels like her muscles are being ripped from the bone.           MS History:  Diagnosed: 1995  First presentation: 1991 [facility]  Disease modifying treatments: Betaseron (had suicidal ideations). Copaxone. Avonex.   Former or other neurologist:   Last attack: Recently  Last MRI:  10/6/17 brain, cervical, thoracic w/wo    ROS:   Constitutional: No fevers or chills.  Eyes: No blurry vision or eye pain.  ENT: No dysphagia or hearing loss.  Respiratory: No cough or shortness of breath.  Cardiovascular: No chest pain or palpitations.  GI: No nausea, vomiting, or diarrhea.  : No urinary incontinence or dysuria.  Musculoskeletal: No joint swelling or arthralgias.  Skin: No skin rashes.  Neuro: No headaches, dizziness, or  tremors.  Endocrine: No heat or cold intolerance. No polydipsia or polyuria.  Psych: No depression or anxiety.  Heme/Lymph: No easy bruising or swollen lymph nodes.  All other review of systems were reviewed and were negative.     Past Medical History:   Past Medical History:   Diagnosis Date   • Graves disease    • Multiple sclerosis (CMS-[facility])        Past Surgical History: History reviewed. No pertinent surgical history.    Social History:   Social History     Social History   • Marital status:      Spouse name: N/A   • Number of children: N/A   • Years of education: N/A     Occupational History   • Not on file.     Social History Main Topics   • Smoking status: Former Smoker     Years: 15.00     Quit date: 2014   • Smokeless tobacco: Never Used   • Alcohol use No   • Drug use: No   • Sexual activity: Not on file     Other Topics Concern   • Not on file     Social History Narrative   • No narrative on file       Family Hx:   Family History   Problem Relation Age of Onset   • Autoimmune Disease Daughter      MS   • Other Daughter      fibromyalgia       Current Medications:   Current Outpatient Prescriptions:   •  carisoprodol (SOMA) 350 MG Tab, Take 1 Tab by mouth 4 times a day for 30 days., Disp: 120 Tab, Rfl: 0  •  baclofen (LIORESAL) 10 MG Tab, Take 1 Tab by mouth 3 times a day., Disp: 30 Tab, Rfl: 3  •  oxycodone-acetaminophen (PERCOCET-10)  MG Tab, Take 1-2 Tabs by mouth every four hours as needed for Severe Pain for up to 30 days., Disp: 28 Tab, Rfl: 0  •  carisoprodol (SOMA) 350 MG Tab, TAKE 1 TABLET BY MOUTH TWICE A DAY AS NEEDED FOR SPASM, Disp: 30 Tab, Rfl: 0  •  levothyroxine (SYNTHROID) 150 MCG Tab, TAKE 1 TABLET(S) BY MOUTH DAILY, Disp: , Rfl: 6  •  diazepam (VALIUM) 10 MG tablet, Take 1 Tab by mouth 2 times a day as needed., Disp: 60 Tab, Rfl: 1  •  atorvastatin (LIPITOR) 10 MG Tab, TAKE 1 TABLET ORALLY EVERY EVENING FOR CHOLESTEROL, Disp: , Rfl: 5  •  metoprolol SR (TOPROL XL) 25 MG  "TABLET SR 24 HR, , Disp: , Rfl:   •  metoprolol (LOPRESSOR) 25 MG Tab, Take 25 mg by mouth 2 times a day., Disp: , Rfl: 3    Allergies:   Allergies   Allergen Reactions   • Bee Venom          Physical Exam:   Ambulatory Vitals  Vitals  Blood Pressure: 118/84  Temperature: 36.3 °C (97.4 °F)  Pulse: 64  Respiration: 18  Pulse Oximetry: 92 %  Height: 160 cm (5' 3\")  Weight: (!) 135.2 kg (298 lb)  Encounter Vitals  Temperature: 36.3 °C (97.4 °F)  Temp Source: Temporal  Orthostatics: Sitting  Blood Pressure: 118/84  BP Location: Left, Upper Arm  Patient BP Position: Sitting  Pulse: 64  Respiration: 18  Pulse Oximetry: 92 %  Weight: (!) 135.2 kg (298 lb)  How Weight Obtained: Stand Up Scale  Height: 160 cm (5' 3\")  BMI (Calculated): 52.79      Constitutional: Well-developed, well-nourished overweight woman. Appears stated age.  Cardiovascular: RRR, with no murmurs, rubs or gallops. No carotid bruits. No peripheral edema, pedal pulses intact.   Respiratory: Lungs CTA B/L, no W/R/R.   Skin: Warm, dry, intact. No rashes observed.  Eyes:                         Funduscopic: Optic discs flat with no evidence of papilledema or pallor. Normal posterior segments.  Neurologic:                        Mental Status: Awake, alert, oriented x 3.                        Speech: Fluent with normal prosody.                        Concentration: Attentive. Able to focus on history and follow multi-step commands.                        Fund of Knowledge: Appropriate.                        Cranial Nerves: PERRL. EOM sadi by broken saccades. Good eye closure. Facial sensation decreased on the left compared to the right. Palate elevates symmetrically. Tongue midline. Symmetric shoulder shrug.                        Sensory: Decreased vibration to the level of the upper extremities bilaterally. Temperature decreased on the left compared to the right.                        Coordination: No evidence of past-pointing on finger to nose " "testing.                        DTR's: 3+ in bilateral biceps, triceps. Unable to elicit in right knee (prior arthroplasty), 1+ in left knee, 1+ in ankles bilaterally.                         Babinski: Toes mute bilaterally.                        Romberg: Untested - patient too unsteady.                         Movements: No tremors observed.   Musculoskeletal:                         Strength: Right hip flexor 2/5, left hip flexor 3/5. Right knee flexor/extensor 3/5, left 2/5. Left foot drop. Right ankle DF/PF 4/5.                         Gait: Broad based and unsteady. Had difficulty transferring from chair to exam table.                        Tone: Normal bulk and tone.                        Joints: No swelling.      Labs:    Imaging:   Today I reviewed the patient's most recent MRI images with them in the examination room. I explained basic terminology of MRI's, verbalized my assessment, and answered their questions.       MRI of the brain w/wo contrast from 10/6/17  1.  Supratentorial white matter lesions with lesion morphology compatible with demyelinating disease/multiple sclerosis. No \"active\" enhancing lesions are evident.  2.  A single subcentimeter demyelinating lesion is seen in the posterior fossa in the right cerebellar hemisphere.      MRI of the cervical spine w/wo contrast from 10/06/17  1.  Degenerative disc space narrowing C5-6, C6-7.  2.  Modic type I discogenic endplate marrow edema at C6-7. This can be a pain generator.  3.  C4-5 tiny central disc bulge or protrusion with no central or foraminal stenosis.  4.  C5-6 disc/osteophyte complex greater toward the right. Mild right lateral recess stenosis. Moderate right foraminal stenosis.  5.  C6-7 small disc/osteophyte complex. No central or foraminal stenosis.  6.  No evidence of cervical spinal cord demyelinating lesion or abnormal cord enhancement at any cervical level.    MRI of the thoracic spine w/wo contrast from 10/6/17  1. " " Supratentorial white matter lesions with lesion morphology compatible with demyelinating disease/multiple sclerosis. No \"active\" enhancing lesions are evident.  2.  A single subcentimeter demyelinating lesion is seen in the posterior fossa in the right cerebellar hemisphere.    Multiple sclerosis (CMS-[facility])  Ms. Richardson is a 63 yo F with multiple sclerosis not on disease modifying therapy. I reviewed her recent MRI imaging with her today, which did not show any \"active\" enhancing lesions, but chronic lesions in the supratentorial white matter and a single lesion in the right cerebellum. The patient was uncomfortable with the potential side effects of the medication. We again reviewed the side effects of Aubagio together, and she does not wish to start the medication at this time. We also reviewed other available medications and their potential side effects including Copaxone, Tecfidera, and Gilenya. She said she does not wish to go on a different medication either. I explained that we have to manage risk of the disease as well as risk of the medication. We agreed to a plan that we will repeat her MRI's in one year, sooner if she has any new symptoms, and if there are additional lesions, we will again revisit the discussion of disease modifying therapy.     Plan:  1. Per patient's informed choice, she does not wish to start medication at this time, but we will repeat MRI's in one year and discuss further at that time.    Spasticity  The full body pain Ms. Richardson describes sounds like poorly controlled spasticity. I explained this to her and her  and recommended an anti-spasticity agent rather than narcotics for this issue. She told me that Baclofen had caused her to have depression in the past and so she was reluctant to try it again. I told her that she could do a trial for a few days and if she noticed her mood start to worsen, that she can stop it. We discussed possible side effects including life threatening " rash (SJS), seizures, and drowsiness. I agreed to also prescribe Soma, as that is what she had been using and which had given her some relief without depressive side effects.     Plan:  1. Trial of Baclofen 10mg at bedtime, if no adverse effects can take an additional tablet as needed during the daytime.  2. Soma 350mg tablets, up to 4 times a day as needed for spasticity, #120.   3. Valiuum 60mg BID prn, #60    Chronic pain  Ms. Richardson is suffering from multiple types of pain - spasticity (see above) and chronic low back pain, in addition to neuropathy. She and her  insist that she is unable to function without narcotic pain medication. I explained that there is likely an underlying explanation for the pain she is feeling and that the safer, and more beneficial way of approaching this is to understand what is driving this. She reports ongoing low back pain and has not had an MRI in many years of her lumbar spine, so I suggested we order this test to understand if she has disc disease that is causing this. I suggested physical therapy and weight loss as a first approach, but she is limited due to the pain and claims that she has already tried several diet approaches without success. She also claims that she gained weight after her thyroid medication was adjusted and so I suggested repeating her TSH to see if it was overcorrected. I agreed to prescribe her Percocet until her appointment with Dr. Almaguer, but if she misses the appointment again, that I cannot continue to prescribe her narcotics.     Plan:  1. Lumbar spine MRI w/o contrast  2. Check TSH  3. Appointment with Dr. Almaguer  4. I will prescribe Percocet until her appointment with Dr. Almaguer - Percocet 10/325mg 28 tablets for 30 days, 1-2 tabs q4 hours for severe pain.         Follow up in 1 month.      Greater than 50% of this 45 minute face to face encounter was devoted to disease state counseling on Multiple Sclerosis, pain management, and coordination of  care. During today's encounter we discussed available treatment options and their individual side effect profiles. Additional time was spent on MRI review with the patient in the exam room during which I provided education on basic radiology terminology and provided my own verbal assessment (see above assessment and plan for further details of discussion).

## 2017-12-26 ENCOUNTER — HOSPITAL ENCOUNTER (OUTPATIENT)
Dept: RADIOLOGY | Facility: MEDICAL CENTER | Age: 62
End: 2017-12-26
Attending: PSYCHIATRY & NEUROLOGY
Payer: MEDICARE

## 2017-12-26 DIAGNOSIS — G89.29 CHRONIC BILATERAL LOW BACK PAIN WITHOUT SCIATICA: ICD-10-CM

## 2017-12-26 DIAGNOSIS — M54.50 CHRONIC BILATERAL LOW BACK PAIN WITHOUT SCIATICA: ICD-10-CM

## 2017-12-26 PROCEDURE — 72148 MRI LUMBAR SPINE W/O DYE: CPT

## 2017-12-27 NOTE — ASSESSMENT & PLAN NOTE
Ms. Richardson is suffering from multiple types of pain - spasticity (see above) and chronic low back pain, in addition to neuropathy. She and her  insist that she is unable to function without narcotic pain medication. I explained that there is likely an underlying explanation for the pain she is feeling and that the safer, and more beneficial way of approaching this is to understand what is driving this. She reports ongoing low back pain and has not had an MRI in many years of her lumbar spine, so I suggested we order this test to understand if she has disc disease that is causing this. I suggested physical therapy and weight loss as a first approach, but she is limited due to the pain and claims that she has already tried several diet approaches without success. She also claims that she gained weight after her thyroid medication was adjusted and so I suggested repeating her TSH to see if it was overcorrected. I agreed to prescribe her Percocet until her appointment with Dr. Almaguer, but if she misses the appointment again, that I cannot continue to prescribe her narcotics.     Plan:  1. Lumbar spine MRI w/o contrast  2. Check TSH  3. Appointment with Dr. Almaguer  4. I will prescribe Percocet until her appointment with Dr. Almaguer - Percocet 10/325mg 28 tablets for 30 days, 1-2 tabs q4 hours for severe pain.

## 2017-12-27 NOTE — ASSESSMENT & PLAN NOTE
The full body pain Ms. Richardson describes sounds like poorly controlled spasticity. I explained this to her and her  and recommended an anti-spasticity agent rather than narcotics for this issue. She told me that Baclofen had caused her to have depression in the past and so she was reluctant to try it again. I told her that she could do a trial for a few days and if she noticed her mood start to worsen, that she can stop it. We discussed possible side effects including life threatening rash (SJS), seizures, and drowsiness. I agreed to also prescribe Soma, as that is what she had been using and which had given her some relief without depressive side effects.     Plan:  1. Trial of Baclofen 10mg at bedtime, if no adverse effects can take an additional tablet as needed during the daytime.  2. Soma 350mg tablets, up to 4 times a day as needed for spasticity, #120.   3. Valiuum 60mg BID prn, #60

## 2017-12-27 NOTE — ASSESSMENT & PLAN NOTE
"Ms. Richardson is a 63 yo F with multiple sclerosis not on disease modifying therapy. I reviewed her recent MRI imaging with her today, which did not show any \"active\" enhancing lesions, but chronic lesions in the supratentorial white matter and a single lesion in the right cerebellum. The patient was uncomfortable with the potential side effects of the medication. We again reviewed the side effects of Aubagio together, and she does not wish to start the medication at this time. We also reviewed other available medications and their potential side effects including Copaxone, Tecfidera, and Gilenya. She said she does not wish to go on a different medication either. I explained that we have to manage risk of the disease as well as risk of the medication. We agreed to a plan that we will repeat her MRI's in one year, sooner if she has any new symptoms, and if there are additional lesions, we will again revisit the discussion of disease modifying therapy.     Plan:  1. Per patient's informed choice, she does not wish to start medication at this time, but we will repeat MRI's in one year and discuss further at that time.  "

## 2018-01-18 ENCOUNTER — OFFICE VISIT (OUTPATIENT)
Dept: NEUROLOGY | Facility: MEDICAL CENTER | Age: 63
End: 2018-01-18
Payer: MEDICARE

## 2018-01-18 VITALS
DIASTOLIC BLOOD PRESSURE: 88 MMHG | RESPIRATION RATE: 17 BRPM | HEIGHT: 63 IN | HEART RATE: 90 BPM | SYSTOLIC BLOOD PRESSURE: 112 MMHG | BODY MASS INDEX: 51.91 KG/M2 | OXYGEN SATURATION: 94 % | TEMPERATURE: 98.1 F | WEIGHT: 293 LBS

## 2018-01-18 DIAGNOSIS — G35 MULTIPLE SCLEROSIS (HCC): ICD-10-CM

## 2018-01-18 DIAGNOSIS — R25.2 SPASTICITY: ICD-10-CM

## 2018-01-18 DIAGNOSIS — M48.07 NEUROFORAMINAL STENOSIS OF LUMBOSACRAL SPINE: ICD-10-CM

## 2018-01-18 DIAGNOSIS — R26.81 GAIT INSTABILITY: ICD-10-CM

## 2018-01-18 DIAGNOSIS — N31.9 NEUROGENIC BLADDER: ICD-10-CM

## 2018-01-18 DIAGNOSIS — M48.061 NEUROFORAMINAL STENOSIS OF LUMBAR SPINE: ICD-10-CM

## 2018-01-18 DIAGNOSIS — G89.29 OTHER CHRONIC PAIN: ICD-10-CM

## 2018-01-18 PROCEDURE — 99215 OFFICE O/P EST HI 40 MIN: CPT | Performed by: PSYCHIATRY & NEUROLOGY

## 2018-01-18 RX ORDER — OXYCODONE AND ACETAMINOPHEN 10; 325 MG/1; MG/1
1-2 TABLET ORAL EVERY 4 HOURS PRN
Qty: 15 TAB | Refills: 0 | Status: SHIPPED | OUTPATIENT
Start: 2018-01-18 | End: 2018-02-15 | Stop reason: SDUPTHER

## 2018-01-18 RX ORDER — DIAZEPAM 10 MG/1
10 TABLET ORAL 2 TIMES DAILY PRN
Qty: 60 TAB | Refills: 1 | Status: SHIPPED | OUTPATIENT
Start: 2018-01-18 | End: 2018-02-15 | Stop reason: SDUPTHER

## 2018-01-18 RX ORDER — CARISOPRODOL 350 MG/1
TABLET ORAL
Qty: 60 TAB | Refills: 2 | Status: SHIPPED | OUTPATIENT
Start: 2018-01-18 | End: 2018-02-15 | Stop reason: SDUPTHER

## 2018-01-18 RX ORDER — OXYCODONE AND ACETAMINOPHEN 10; 325 MG/1; MG/1
1-2 TABLET ORAL EVERY 4 HOURS PRN
COMMUNITY
End: 2018-01-18 | Stop reason: SDUPTHER

## 2018-01-18 ASSESSMENT — PATIENT HEALTH QUESTIONNAIRE - PHQ9
CLINICAL INTERPRETATION OF PHQ2 SCORE: 6
5. POOR APPETITE OR OVEREATING: 0 - NOT AT ALL
SUM OF ALL RESPONSES TO PHQ QUESTIONS 1-9: 18

## 2018-01-18 ASSESSMENT — PAIN SCALES - GENERAL: PAINLEVEL: 9=SEVERE PAIN

## 2018-01-18 NOTE — PROGRESS NOTES
CC: Multiple Sclerosis       HPI:    Regina Richardson is a 62 y.o. female who presents today in neurologic follow up for multiple sclerosis. The patient is not currently on disease modifying therapy per patient decision. At her last visit, we had discussed her ongoing pain issues - particularly her MS Hug symptoms and low back pain. She has been utilizing Soma with good relief, but still has breakthrough pain and was not sure how she was supposed to take Baclofen  Additionally, they deny any new symptoms related to their MS including numbness/tingling, vision loss, weakness, dizziness, or gait imbalance.       ROS:   Constitutional: No fevers or chills.  Eyes: No blurry vision or eye pain.  ENT: No dysphagia or hearing loss.  Respiratory: No cough or shortness of breath.  Cardiovascular: No chest pain or palpitations.  GI: No nausea, vomiting, or diarrhea.  : No urinary incontinence or dysuria.  Musculoskeletal: No joint swelling or arthralgias.  Skin: No skin rashes.  Neuro: No headaches, dizziness, or tremors.  Endocrine: No heat or cold intolerance. No polydipsia or polyuria.  Psych: No depression or anxiety.  Heme/Lymph: No easy bruising or swollen lymph nodes.  All other review of systems were reviewed and were negative.     Past Medical History:   Past Medical History:   Diagnosis Date   • Graves disease    • Head ache    • Multiple sclerosis (CMS-HCC)    • Muscle disorder        Past Surgical History: History reviewed. No pertinent surgical history.    Social History:   Social History     Social History   • Marital status:      Spouse name: N/A   • Number of children: N/A   • Years of education: N/A     Occupational History   • Not on file.     Social History Main Topics   • Smoking status: Former Smoker     Years: 15.00     Quit date: 2014   • Smokeless tobacco: Never Used   • Alcohol use No   • Drug use: No   • Sexual activity: Not on file     Other Topics Concern   • Not on file     Social History  "Narrative   • No narrative on file       Family Hx:   Family History   Problem Relation Age of Onset   • Autoimmune Disease Daughter      MS   • Other Daughter      fibromyalgia       Current Medications:   Current Outpatient Prescriptions:   •  oxycodone-acetaminophen (PERCOCET-10)  MG Tab, Take 1-2 Tabs by mouth every four hours as needed for Severe Pain for up to 30 days., Disp: 15 Tab, Rfl: 0  •  diazepam (VALIUM) 10 MG tablet, Take 1 Tab by mouth 2 times a day as needed for up to 30 days., Disp: 60 Tab, Rfl: 1  •  carisoprodol (SOMA) 350 MG Tab, TAKE 1 TABLET BY MOUTH TWICE A DAY AS NEEDED FOR SPASM, Disp: 60 Tab, Rfl: 2  •  baclofen (LIORESAL) 10 MG Tab, Take 1 Tab by mouth 3 times a day., Disp: 30 Tab, Rfl: 3  •  atorvastatin (LIPITOR) 10 MG Tab, TAKE 1 TABLET ORALLY EVERY EVENING FOR CHOLESTEROL, Disp: , Rfl: 5  •  levothyroxine (SYNTHROID) 150 MCG Tab, TAKE 1 TABLET(S) BY MOUTH DAILY, Disp: , Rfl: 6  •  metoprolol SR (TOPROL XL) 25 MG TABLET SR 24 HR, , Disp: , Rfl:   •  metoprolol (LOPRESSOR) 25 MG Tab, Take 25 mg by mouth 2 times a day., Disp: , Rfl: 3    Allergies:   Allergies   Allergen Reactions   • Bee Venom          Physical Exam:   Ambulatory Vitals  Vitals  Blood Pressure: 112/88  Temperature: 36.7 °C (98.1 °F)  Pulse: 90  Respiration: 17  Pulse Oximetry: 94 %  Height: 160 cm (5' 3\")  Weight: (!) 132.9 kg (293 lb)  Encounter Vitals  Temperature: 36.7 °C (98.1 °F)  Temp Source: Tympanic  Orthostatics: Sitting  Blood Pressure: 112/88  BP Location: Left, Forearm  Patient BP Position: Sitting  Pulse: 90  Respiration: 17  Pulse Oximetry: 94 %  Weight: (!) 132.9 kg (293 lb)  How Weight Obtained: Stand Up Scale  Height: 160 cm (5' 3\")  BMI (Calculated): 51.9  Pain Score: 9=Severe Pain  Location: Back, Leg, Rib Cage, Head      Constitutional: Well-developed, well-nourished, good hygiene. Appears stated age.  Cardiovascular: RRR, with no murmurs, rubs or gallops. No carotid bruits. No peripheral " edema, pedal pulses intact.   Respiratory: Lungs CTA B/L, no W/R/R.   Skin: Warm, dry, intact. No rashes observed.  Eyes:    Funduscopic: Optic discs flat with no evidence of papilledema or pallor. Normal posterior segments.  Neurologic:   Mental Status: Awake, alert, oriented x 3.   Speech: Fluent with normal prosody.   Memory: Able to recall 3 words at 1 minute and 5 minutes. Able to recall recent and remote events accurately.    Concentration: Attentive. Able to focus on history and follow multi-step commands.   Fund of Knowledge: Appropriate.   Cranial Nerves:    CN II: PERRL. No afferent pupillary defect.    CN III, IV, VI: Good eye closure, EOMI.     CN V: Facial sensation intact and symmetric.     CN VII: No facial asymmetry.     CN VIII: Hearing intact.     CN IX and X: Palate elevates symmetrically. Normal gag reflex.    CN XI: Symmetric shoulder shrug.     CN XII: Tongue midline.    Sensory: Decreased vibration to the level of the upper extremities bilaterally. Temperature decreased on the left compared to the right.    Coordination: Decreased coordination in upper extremities.   DTR's: 3+ in bilateral biceps, triceps. Unable to elicit in right knee (prior arthroplasty), 1+ in left knee, 1+ in ankles bilaterally.     Babinski: Toes mute bilaterally.   Romberg: Deferred.   Movements: No tremors observed.   Musculoskeletal:    Strength: Left hip flexors 2/5, left drop foot. 4/5 in right lower extremities and bilateral upper extremities throughout.    Gait: Steady, narrow based.    Tone: Normal bulk and tone.   Joints: No swelling.     Imaging:   Today I reviewed the patient's most recent MRI images with her in the examination room. I explained basic terminology of MRI's, verbalized my assessment, and answered her questions.     MRI of the lumbar spine w/o contrast from 12/26/2017  1. Multilevel degenerative disc disease. Modic type I endplate change at L2-3 and L5-S1, most at L5-S1.  2. Moderate narrowing of  "the spinal canal at L3-4. Mild narrowing of the spinal canal L4-5.  3. Moderate to severe narrowing of the bilateral neuroforamina at L3-4, L4-5 and L5-S1, most at left L5-S1.      Assessment/Plan:  Chronic pain  Ms. Richardson likely suffers from multiple types of pain - spasticity in the thoracic area likely causing \"MS hug\" phenomenon,and severe low back pain secondary to the neuroforaminal stenosis evidenced on her MRI imaging. Today we discussed her pain management plan. We have a clear plan that I will only prescribe her #15 Percocet 10/325/month until her appointment with pain management. I believe a great deal of her pain is due to poorly treated spasticity, and so have agreed to continue her Soma prescription, but also instructed her to take a Baclofen when she has breakthrough pain in her torso that could be MS hug. Today we signed a pain contract. I screened her using an Opioid Risk Tool and she scored a 1.     Percocet 10/325 #15 (30 day supply) Q6 hrs prn for severe pain  Urine Drug Screen        Spasticity  (See above under 'chronic pain')  Soma 350mg BID #60 (30 day supply)  Baclofen 10mg up to 3 times a day PRN       Neuroforaminal stenosis of lumbosacral spine  Today we reviewed Ms. Richardson's recent lumbar MRI that showed severe neuroforaminal stenosis from L3-S1. I would like her to be evaluated by a neurosurgeon as her quality of life has been significantly impacted by her chronic pain and walking difficulties. The walking issues are disproportionate to what is likely being caused by her MS. We also discussed weight loss which would likely alleviate some of the back pain, but exercise is limited by her pain.    Referral placed to Dr. Blackwell, Neurosurgery.    Multiple sclerosis (CMS-HCC)  Relapsing Remitting MS not currently on treatment. Our plan remains to repeat her MRI's in October 2018. If there are changes, we will revisit the topic of disease modifying therapy. She again reiterated to me that she does " not wish to start treatment at this time due to fear of side effects.         Greater than 50% of this 40 minute face to face follow up encounter was devoted to disease state counseling on Multiple Sclerosis, lumbar stenosis, opiate use and coordination of care. (see above assessment and plan for further details of discussion).

## 2018-01-21 PROBLEM — M48.07 NEUROFORAMINAL STENOSIS OF LUMBOSACRAL SPINE: Status: ACTIVE | Noted: 2018-01-21

## 2018-01-21 NOTE — ASSESSMENT & PLAN NOTE
Relapsing Remitting MS not currently on treatment. Our plan remains to repeat her MRI's in October 2018. If there are changes, we will revisit the topic of disease modifying therapy. She again reiterated to me that she does not wish to start treatment at this time due to fear of side effects.

## 2018-01-21 NOTE — ASSESSMENT & PLAN NOTE
"Ms. Richardson likely suffers from multiple types of pain - spasticity in the thoracic area likely causing \"MS hug\" phenomenon,and severe low back pain secondary to the neuroforaminal stenosis evidenced on her MRI imaging. Today we discussed her pain management plan. We have a clear plan that I will only prescribe her #15 Percocet 10/325/month until her appointment with pain management. I believe a great deal of her pain is due to poorly treated spasticity, and so have agreed to continue her Soma prescription, but also instructed her to take a Baclofen when she has breakthrough pain in her torso that could be MS hug. Today we signed a pain contract. I screened her using an Opioid Risk Tool and she scored a 1.     Percocet 10/325 #15 (30 day supply) Q6 hrs prn for severe pain  Urine Drug Screen      "

## 2018-01-21 NOTE — ASSESSMENT & PLAN NOTE
(See above under 'chronic pain')  Soma 350mg BID #60 (30 day supply)  Baclofen 10mg up to 3 times a day PRN

## 2018-01-21 NOTE — ASSESSMENT & PLAN NOTE
Today we reviewed Ms. Richardson's recent lumbar MRI that showed severe neuroforaminal stenosis from L3-S1. I would like her to be evaluated by a neurosurgeon as her quality of life has been significantly impacted by her chronic pain and walking difficulties. The walking issues are disproportionate to what is likely being caused by her MS. We also discussed weight loss which would likely alleviate some of the back pain, but exercise is limited by her pain.    Referral placed to Dr. Blackwell, Neurosurgery.

## 2018-02-15 ENCOUNTER — OFFICE VISIT (OUTPATIENT)
Dept: NEUROLOGY | Facility: MEDICAL CENTER | Age: 63
End: 2018-02-15
Payer: MEDICARE

## 2018-02-15 VITALS
BODY MASS INDEX: 51.38 KG/M2 | WEIGHT: 290 LBS | TEMPERATURE: 97.9 F | HEIGHT: 63 IN | SYSTOLIC BLOOD PRESSURE: 116 MMHG | OXYGEN SATURATION: 96 % | HEART RATE: 86 BPM | DIASTOLIC BLOOD PRESSURE: 80 MMHG

## 2018-02-15 DIAGNOSIS — R25.2 SPASTICITY: ICD-10-CM

## 2018-02-15 DIAGNOSIS — M48.07 NEUROFORAMINAL STENOSIS OF LUMBOSACRAL SPINE: ICD-10-CM

## 2018-02-15 DIAGNOSIS — G35 MULTIPLE SCLEROSIS (HCC): ICD-10-CM

## 2018-02-15 DIAGNOSIS — R26.81 GAIT INSTABILITY: ICD-10-CM

## 2018-02-15 DIAGNOSIS — G89.29 OTHER CHRONIC PAIN: ICD-10-CM

## 2018-02-15 DIAGNOSIS — N31.9 NEUROGENIC BLADDER: ICD-10-CM

## 2018-02-15 PROCEDURE — 99215 OFFICE O/P EST HI 40 MIN: CPT | Performed by: PSYCHIATRY & NEUROLOGY

## 2018-02-15 RX ORDER — OXYCODONE AND ACETAMINOPHEN 10; 325 MG/1; MG/1
1-2 TABLET ORAL EVERY 4 HOURS PRN
Qty: 15 TAB | Refills: 0 | Status: SHIPPED | OUTPATIENT
Start: 2018-02-15 | End: 2018-03-17

## 2018-02-15 RX ORDER — DIAZEPAM 10 MG/1
10 TABLET ORAL 2 TIMES DAILY PRN
Qty: 60 TAB | Refills: 1 | Status: SHIPPED | OUTPATIENT
Start: 2018-02-15 | End: 2018-03-17

## 2018-02-15 RX ORDER — CARISOPRODOL 350 MG/1
TABLET ORAL
Qty: 120 TAB | Refills: 2 | Status: SHIPPED | OUTPATIENT
Start: 2018-02-15 | End: 2018-03-18

## 2018-02-15 NOTE — PROGRESS NOTES
CC: Multiple Sclerosis       HPI:    Regina Richardson is a 62 y.o. female who presents today in neurologic follow up for multiple sclerosis. She is not currently on any disease modifying treatments, but tells me that for the last 2 weeks she has had vision changes in her left eye, and a change in her chronic vertigo. The vertigo seems to occur while she is lying down or changing his additions at times. She is also had increased spasticity on her left side. She points out that the amount of Soma prescribed to her was half of her prior dose, which may have contributed to the increase in spasticity symptoms. She denies any falls since her last visit. She denies any pain in her eyes. She continues to suffer from chronic low back pain, and has an appointment scheduled with a neurosurgical PA on March 2. Her pain management appointment is coming up in April.    She tells me that she had her urine tox screen required for her opiate monitoring done at a [facility] satellite in [place], but no records are available for my review.    MS History:  Diagnosed: 1995  First presentation: 1991 Double Vision  Disease modifying treatments: Betaseron (had suicidal ideations). Copaxone. Avonex.   Former or other neurologist:   Last attack: Recently  Last MRI:  10/6/17 brain, cervical, thoracic w/wo    ROS:   Constitutional: No fevers or chills.  Eyes: + blurry vision, no eye pain.  ENT: No dysphagia or hearing loss.  Respiratory: No cough or shortness of breath.  Cardiovascular: No chest pain or palpitations.  GI: No nausea, vomiting, or diarrhea.  : No urinary incontinence or dysuria.  Musculoskeletal: + Low back pain.  Skin: No skin rashes.  Neuro: No headaches, + dizziness, no tremors.  Endocrine: No heat or cold intolerance. No polydipsia or polyuria.  Psych: No depression or anxiety.  Heme/Lymph: No easy bruising or swollen lymph nodes.  All other review of systems were reviewed and were negative.     Past Medical History:   Past  Medical History:   Diagnosis Date   • Graves disease    • Head ache    • Multiple sclerosis (CMS-HCC)    • Muscle disorder        Past Surgical History: History reviewed. No pertinent surgical history.    Social History:   Social History     Social History   • Marital status:      Spouse name: N/A   • Number of children: N/A   • Years of education: N/A     Occupational History   • Not on file.     Social History Main Topics   • Smoking status: Former Smoker     Years: 15.00     Quit date: 2014   • Smokeless tobacco: Never Used   • Alcohol use No   • Drug use: No   • Sexual activity: Not on file     Other Topics Concern   • Not on file     Social History Narrative   • No narrative on file       Family Hx:   Family History   Problem Relation Age of Onset   • Autoimmune Disease Daughter      MS   • Other Daughter      fibromyalgia       Current Medications:   Current Outpatient Prescriptions:   •  oxyCODONE-acetaminophen (PERCOCET-10)  MG Tab, Take 1-2 Tabs by mouth every four hours as needed for Severe Pain for up to 30 days., Disp: 15 Tab, Rfl: 0  •  carisoprodol (SOMA) 350 MG Tab, TAKE 1 TABLET BY MOUTH TWICE A DAY AS NEEDED FOR SPASM, Disp: 120 Tab, Rfl: 2  •  diazepam (VALIUM) 10 MG tablet, Take 1 Tab by mouth 2 times a day as needed for up to 30 days., Disp: 60 Tab, Rfl: 1  •  baclofen (LIORESAL) 10 MG Tab, Take 1 Tab by mouth 3 times a day., Disp: 30 Tab, Rfl: 3  •  levothyroxine (SYNTHROID) 150 MCG Tab, TAKE 1 TABLET(S) BY MOUTH DAILY, Disp: , Rfl: 6  •  atorvastatin (LIPITOR) 10 MG Tab, TAKE 1 TABLET ORALLY EVERY EVENING FOR CHOLESTEROL, Disp: , Rfl: 5  •  metoprolol SR (TOPROL XL) 25 MG TABLET SR 24 HR, , Disp: , Rfl:   •  metoprolol (LOPRESSOR) 25 MG Tab, Take 25 mg by mouth 2 times a day., Disp: , Rfl: 3    Allergies:   Allergies   Allergen Reactions   • Bee Venom          Physical Exam:   Ambulatory Vitals  Vitals  Blood Pressure: 116/80  Temperature: 36.6 °C (97.9 °F)  Pulse: 86  Pulse  "Oximetry: 96 %  Height: 160 cm (5' 3\")  Weight: (!) 131.5 kg (290 lb)  Encounter Vitals  Temperature: 36.6 °C (97.9 °F)  Temp Source: Temporal  Blood Pressure: 116/80  BP Location: Upper Arm  Patient BP Position: Sitting  Pulse: 86  Pulse Oximetry: 96 %  Weight: (!) 131.5 kg (290 lb)  How Weight Obtained: Stand Up Scale  Height: 160 cm (5' 3\")  BMI (Calculated): 51.37      Constitutional: Well-developed, well-nourished overweight woman. Appears stated age.  Cardiovascular: RRR, with no murmurs, rubs or gallops. No carotid bruits. No peripheral edema, pedal pulses intact.   Respiratory: Lungs CTA B/L, no W/R/R.   Skin: Warm, dry, intact. No rashes observed.  Eyes:                         Funduscopic: Optic discs flat with no evidence of papilledema or pallor. Normal posterior segments.  Neurologic:                        Mental Status: Awake, alert, oriented x 3.                        Speech: Fluent with normal prosody.                        Concentration: Attentive. Able to focus on history and follow multi-step commands.                        Fund of Knowledge: Appropriate.                        Cranial Nerves: PERRL. EOM sadi by broken saccades. Good eye closure. Facial sensation decreased on the left compared to the right. Palate elevates symmetrically. Tongue midline. Symmetric shoulder shrug.                        Sensory: Decreased vibration to the level of the upper extremities bilaterally. Temperature decreased on the left compared to the right.                        Coordination: No evidence of past-pointing on finger to nose testing.                        DTR's: 3+ in bilateral biceps, triceps. Unable to elicit in right knee (prior arthroplasty), 1+ in left knee, 1+ in ankles bilaterally.                         Babinski: Toes mute bilaterally.                        Romberg: Untested - patient too unsteady.                         Movements: No tremors observed.   Musculoskeletal: "                         Strength: Right hip flexor 2/5, left hip flexor 3/5. Right knee flexor/extensor 3/5, left 2/5. Left foot drop. Right ankle DF/PF 4/5.                         Gait: Broad based and unsteady. Had difficulty transferring from chair to exam table.                        Tone: Normal bulk and tone.                        Joints: No swelling.              Assessment/Plan:  Multiple sclerosis (CMS-[facility])  Mrs. Richardson is a 62-year-old woman with a history of multiple sclerosis diagnosed in 1995 with symptoms of double vision in 1991, previously treated with Betaseron, Copaxone, Avonex, not currently on disease modifying treatment, who has had visual changes for the last 2 weeks as well as a change in her chronic vertigo. She does not wish to undergo repeat brain MRI imaging as she would not wish to go on any disease modifying treatments. I recommend that we monitor her symptoms for the time being.    Plan:  1. Repeat MRI imaging in May 2018.  2. Encouraged her to start a vitamin D supplement up to 4000 IUs per day  3. Importance of fall prevention was stressed    Chronic pain  Chronic low back pain secondary to her lumbar spinal stenosis for which she has a scheduled neurosurgical appointment on March 2. I have agreed to continue prescribing her 15 tablets of Percocet per month until her initial consultation with pain management which is in April. I again explained that until then, I can be the only prescriber of narcotic medications for her. I checked the [facility] with her, and since I have started prescribing for her she has not had any other prescribers. Her U tox is not available in our system. This will need to be reordered    Plan:  1. Percocet 5/325 #15 for 30 days,  2. U tox    Spasticity  Spasticity continues to be a major problem for Miss Richardson. She is currently on Valium 10 mg twice a day, and Soma 350 mg twice a day, as well as baclofen 10 mg up to 3 times a day for breakthrough spasms. None  of this seems to be effective. I will continue to prescribe her Valium and Soma, but may suggest a consultation in the future with Dr.Elie Costello regarding her spasticity.     Valium 10mg BID #60  Soma 350mg BID #120  Baclofen 10mg PO TID prn        Greater than 50% of this 40 minute face to face follow up encounter was devoted to disease state counseling on Multiple Sclerosis and coordination of care. During today's encounter we discussed available treatment options and their individual side effect profiles. (see above assessment and plan for further details of discussion).

## 2018-02-17 NOTE — ASSESSMENT & PLAN NOTE
Chronic low back pain secondary to her lumbar spinal stenosis for which she has a scheduled neurosurgical appointment on March 2. I have agreed to continue prescribing her 15 tablets of Percocet per month until her initial consultation with pain management which is in April. I again explained that until then, I can be the only prescriber of narcotic medications for her. I checked the [facility] with her, and since I have started prescribing for her she has not had any other prescribers. Her U tox is not available in our system. This will need to be reordered    Plan:  1. Percocet 5/325 #15 for 30 days,  2. U tox

## 2018-02-17 NOTE — ASSESSMENT & PLAN NOTE
Spasticity continues to be a major problem for Miss Richardson. She is currently on Valium 10 mg twice a day, and Soma 350 mg twice a day, as well as baclofen 10 mg up to 3 times a day for breakthrough spasms. None of this seems to be effective. I will continue to prescribe her Valium and Soma, but may suggest a consultation in the future with Dr.Elie Costello regarding her spasticity.     Valium 10mg BID #60  Soma 350mg BID #120  Baclofen 10mg PO TID prn

## 2018-02-17 NOTE — ASSESSMENT & PLAN NOTE
Mrs. Richardson is a 62-year-old woman with a history of multiple sclerosis diagnosed in 1995 with symptoms of double vision in 1991, previously treated with Betaseron, Copaxone, Avonex, not currently on disease modifying treatment, who has had visual changes for the last 2 weeks as well as a change in her chronic vertigo. She does not wish to undergo repeat brain MRI imaging as she would not wish to go on any disease modifying treatments. I recommend that we monitor her symptoms for the time being.    Plan:  1. Repeat MRI imaging in May 2018.  2. Encouraged her to start a vitamin D supplement up to 4000 IUs per day  3. Importance of fall prevention was stressed

## 2018-03-16 ENCOUNTER — APPOINTMENT (OUTPATIENT)
Dept: NEUROLOGY | Facility: MEDICAL CENTER | Age: 63
End: 2018-03-16
Payer: MEDICARE

## 2018-03-19 ENCOUNTER — OFFICE VISIT (OUTPATIENT)
Dept: NEUROLOGY | Facility: MEDICAL CENTER | Age: 63
End: 2018-03-19
Payer: MEDICARE

## 2018-03-19 VITALS
HEART RATE: 86 BPM | HEIGHT: 63 IN | TEMPERATURE: 97.2 F | WEIGHT: 281.53 LBS | BODY MASS INDEX: 49.88 KG/M2 | SYSTOLIC BLOOD PRESSURE: 118 MMHG | DIASTOLIC BLOOD PRESSURE: 82 MMHG

## 2018-03-19 DIAGNOSIS — R25.2 SPASTICITY: ICD-10-CM

## 2018-03-19 DIAGNOSIS — M54.42 CHRONIC BILATERAL LOW BACK PAIN WITH BILATERAL SCIATICA: ICD-10-CM

## 2018-03-19 DIAGNOSIS — M48.07 NEUROFORAMINAL STENOSIS OF LUMBOSACRAL SPINE: ICD-10-CM

## 2018-03-19 DIAGNOSIS — G89.29 OTHER CHRONIC PAIN: ICD-10-CM

## 2018-03-19 DIAGNOSIS — Z79.891 OPIATE ANALGESIC CONTRACT EXISTS: ICD-10-CM

## 2018-03-19 DIAGNOSIS — G89.29 CHRONIC BILATERAL LOW BACK PAIN WITH BILATERAL SCIATICA: ICD-10-CM

## 2018-03-19 DIAGNOSIS — M54.41 CHRONIC BILATERAL LOW BACK PAIN WITH BILATERAL SCIATICA: ICD-10-CM

## 2018-03-19 DIAGNOSIS — G35 MULTIPLE SCLEROSIS (HCC): ICD-10-CM

## 2018-03-19 DIAGNOSIS — R26.81 GAIT INSTABILITY: ICD-10-CM

## 2018-03-19 PROCEDURE — 99215 OFFICE O/P EST HI 40 MIN: CPT | Performed by: PSYCHIATRY & NEUROLOGY

## 2018-03-19 RX ORDER — DIAZEPAM 10 MG/1
10 TABLET ORAL EVERY 6 HOURS PRN
COMMUNITY
End: 2018-03-19 | Stop reason: SDUPTHER

## 2018-03-19 RX ORDER — CARISOPRODOL 350 MG/1
350 TABLET ORAL 4 TIMES DAILY
Qty: 30 TAB | Refills: 3 | Status: SHIPPED | OUTPATIENT
Start: 2018-03-19 | End: 2018-04-16 | Stop reason: SDUPTHER

## 2018-03-19 RX ORDER — DIAZEPAM 10 MG/1
10 TABLET ORAL EVERY 6 HOURS PRN
Qty: 30 TAB | Refills: 3 | Status: SHIPPED | OUTPATIENT
Start: 2018-03-19 | End: 2018-04-16 | Stop reason: SDUPTHER

## 2018-03-19 RX ORDER — CARISOPRODOL 350 MG/1
350 TABLET ORAL 4 TIMES DAILY
COMMUNITY
End: 2018-03-19 | Stop reason: SDUPTHER

## 2018-03-19 RX ORDER — OXYCODONE AND ACETAMINOPHEN 10; 325 MG/1; MG/1
1-2 TABLET ORAL EVERY 4 HOURS PRN
COMMUNITY
End: 2018-03-19 | Stop reason: SDUPTHER

## 2018-03-19 RX ORDER — OXYCODONE AND ACETAMINOPHEN 10; 325 MG/1; MG/1
1-2 TABLET ORAL EVERY 4 HOURS PRN
Qty: 15 TAB | Refills: 0 | Status: SHIPPED | OUTPATIENT
Start: 2018-03-19 | End: 2018-04-16 | Stop reason: SDUPTHER

## 2018-03-19 NOTE — PROGRESS NOTES
"CC: Multiple Sclerosis       HPI:    Regina Richardson is a 62 y.o. female with a history of chronic low back pain secondary to lumbar spinal stenosis, Graves' Disease, who presents today in neurologic follow up for multiple sclerosis. The patient is not currently being treated with disease modifying therapy. She denies any new symptoms related to their MS since her last visit.     She was recently seen by a PA in the neurosurgical practice of Dr. Torsten Blackwell who recommended epidural steroid injection. However, she tells me she has tried this in the past, and it did not help her symptoms and seemed to actually exacerbate them. She has also tried physical therapy in the past, but this also did not improve things.     She reports at least two falls since her last visit. Both occurred in her home. She did not seriously injure herself, but had to call for help getting up from the floor both times. She keeps her cell phone with her at all times because of this. She feels like she trips over her own feet. She has not been using any assistive devices such as a cane or a walker in part because she does not want to \"give in\" to her MS.    She also mentions that she had two ocular migraines on two different days back to back, lasting 20 minutes each. During these events, she states that her vision looks as if it is \"tearing apart.\" She is sensitive to the light, but does not have accompanying headache.     Her spasticity remains under control on her current doses of Valiuum and Soma. She finishes the 15 Percocet tablets prescribed per month within the first half of the month, and so assures me that her utox will not show opiates.     MS History:  Diagnosed: 1995  First presentation: 1991 [facility]  Disease modifying treatments: Betaseron (had suicidal ideations). Copaxone. Avonex.   Former or other neurologist:   Last attack: Recently  Last MRI:  10/6/17 brain, cervical, thoracic w/wo    ROS:   Constitutional: No fevers or " chills.  Eyes: No eye pain. + visual changes with ocular migraines.  ENT: No dysphagia or hearing loss.  Respiratory: No cough or shortness of breath.  Cardiovascular: No chest pain or palpitations.  GI: No nausea, vomiting, or diarrhea.  : No urinary incontinence or dysuria.  Musculoskeletal: + chronic low back pain.   Skin: No skin rashes.  Neuro: No headaches, + dizziness, or tremors. + falls.  Endocrine: No heat or cold intolerance. No polydipsia or polyuria.  Psych: No depression or anxiety.  Heme/Lymph: No easy bruising or swollen lymph nodes.  All other review of systems were reviewed and were negative.     Past Medical History:   Past Medical History:   Diagnosis Date   • Graves disease    • Head ache    • Lumbar spinal stenosis    • Multiple sclerosis (CMS-HCC)    • Muscle disorder    • Ocular migraine        Past Surgical History: History reviewed. No pertinent surgical history.    Social History:   Social History     Social History   • Marital status:      Spouse name: N/A   • Number of children: N/A   • Years of education: N/A     Occupational History   • Not on file.     Social History Main Topics   • Smoking status: Former Smoker     Years: 15.00     Quit date: 2014   • Smokeless tobacco: Never Used   • Alcohol use No   • Drug use: No   • Sexual activity: Not on file     Other Topics Concern   • Not on file     Social History Narrative   • No narrative on file       Family Hx:   Family History   Problem Relation Age of Onset   • Autoimmune Disease Daughter      MS   • Other Daughter      fibromyalgia       Current Medications:   Current Outpatient Prescriptions:   •  diazepam (VALIUM) 10 MG tablet, Take 1 Tab by mouth every 6 hours as needed for Anxiety for up to 30 days., Disp: 30 Tab, Rfl: 3  •  carisoprodol (SOMA) 350 MG Tab, Take 1 Tab by mouth 4 times a day for 120 days., Disp: 30 Tab, Rfl: 3  •  oxyCODONE-acetaminophen (PERCOCET-10)  MG Tab, Take 1-2 Tabs by mouth every four hours  "as needed for Severe Pain for up to 30 days., Disp: 15 Tab, Rfl: 0  •  baclofen (LIORESAL) 10 MG Tab, Take 1 Tab by mouth 3 times a day., Disp: 30 Tab, Rfl: 3  •  atorvastatin (LIPITOR) 10 MG Tab, TAKE 1 TABLET ORALLY EVERY EVENING FOR CHOLESTEROL, Disp: , Rfl: 5  •  levothyroxine (SYNTHROID) 150 MCG Tab, TAKE 1 TABLET(S) BY MOUTH DAILY, Disp: , Rfl: 6  •  metoprolol SR (TOPROL XL) 25 MG TABLET SR 24 HR, , Disp: , Rfl:   •  metoprolol (LOPRESSOR) 25 MG Tab, Take 25 mg by mouth 2 times a day., Disp: , Rfl: 3    Allergies:   Allergies   Allergen Reactions   • Bee Venom          Physical Exam:   Ambulatory Vitals  Vitals  Blood Pressure: 118/82  Temperature: 36.2 °C (97.2 °F)  Pulse: 86  Height: 160 cm (5' 3\")  Weight: (!) 127.7 kg (281 lb 8.4 oz)  Encounter Vitals  Temperature: 36.2 °C (97.2 °F)  Blood Pressure: 118/82  Pulse: 86  Weight: (!) 127.7 kg (281 lb 8.4 oz)  Height: 160 cm (5' 3\")  BMI (Calculated): 49.87      Constitutional:  female with good hygiene. Appears stated age.  Cardiovascular: RRR, with no murmurs, rubs or gallops. No carotid bruits. No peripheral edema, pedal pulses intact.   Respiratory: Lungs CTA B/L, no W/R/R.   Skin: Warm, dry, intact. No rashes observed.  Eyes:    Funduscopic: Optic discs flat with no evidence of papilledema or pallor. Normal posterior segments.  Neurologic:   Mental Status: Awake, alert, oriented x 3.   Speech: Fluent with normal prosody.   Memory: Able to recall 3 words at 1 minute and 5 minutes. Able to recall recent and remote events accurately.    Concentration: Attentive. Able to focus on history and follow multi-step commands.   Fund of Knowledge: Appropriate.   Cranial Nerves:    CN II: PERRL. No afferent pupillary defect.    CN III, IV, VI: Good eye closure, EOM marked by broken saccades.     CN V: Facial sensation decreased on the left.     CN VII: No facial asymmetry.     CN VIII: Hearing intact.     CN IX and X: Palate elevates symmetrically. Normal gag " "reflex.    CN XI: Symmetric shoulder shrug.     CN XII: Tongue midline.    Sensory: Decreased vibration to the level of the upper extremities bilaterally. Temperature decreased on the left compared to the right.     Coordination: No evidence of past-pointing on finger to nose testing. DTR's: 3+ in the bilateral biceps, triceps. Unable to elicit in the right knee, 1+ in the left, 1+ in bilateral ankles.   Babinski: Toes mute bilaterally.   Romberg: Deferred.   Movements: No tremors observed.   Musculoskeletal:    Strength: Right hip flexor 2/5, left hip flexor 3/5. Right knee flexor/extensor 3/5, left 2/5. Left foot drop. Right ankle DF/PF 4/5.                Gait: Broad based and unsteady. Had difficulty transferring from chair to exam table.   Tone: Normal bulk and tone.   Joints: No swelling.     Assessment/Plan:  Multiple sclerosis (CMS-HCC)  Ms. Richardson is a 63 yo F with MS since 1995 who had depressive symptoms on interferons, and injection site reactions on Copaxone, whose disease remains relatively stable off of disease modifying therapy, with chronic neuropathy and spasticity that remains unchanged. We agreed to repeat her MRI imaging in 6 months, which would be in April 2018. However, right now her most disabling issue is chronic low back pain from lumbar spinal stenosis. She has requested that I speak with Dr. Blackwell's office regarding her recent visit where she was suggested to have an epidural injection. However, she has already had this in the past and is not interested in pursuing this as a \"band aid\" that will likely just prolong her pain. I told her I would discuss with his team. Once this issue is addressed, we can focus on her MS. If there is new disease activity, we decided to revisit the issue of disease modifying therapy.    Plan:  1. Will repeat MRI imaging after lumbar spine is addressed  2. If MRI's show new disease activity, will revisit discussion re: disease modifying therapy.     Gait " instability  She has had recent falls. We discussed the importance of fall prevention and I encouraged her to utilize a cane or a walker. I provided another prescription for a rollator walker, which she said she would obtain.     Chronic pain  Secondary to Lumbar spinal stenosis. She has a pain management appointment scheduled for April 27th. I have agreed to continue with the #15 tablets of Percocet until this appointment. We again discussed that she needs to have a UTox performed as part of the pain contract.     Plan:  1. Rx'ed #15 Percocet with 0 refills at today's visit  2. Utox ordered.    Spasticity  Currently controlled with current regimen of Valiuum and Soma.     Medications refilled at today's visit.   Valiuum 10mg #30 with 2 refills  Soma 350mg #120 with 2 refills    Neuroforaminal stenosis of lumbosacral spine  I will reach out to Dr. Blackwell's office to request a rescheduled appointment since she does not wish to proceed with epidural injections, but wishes to discuss surgical treatment options with Dr. Blackwell.         Sofia Mejias D.O., M.P.H  MS specialist.   Board Certified Neurologist.  Neurology Clerkship Director, [facility].    Neurology,  [facility].   Tel: [phone]  Fax: [phone]

## 2018-03-20 NOTE — ASSESSMENT & PLAN NOTE
Secondary to Lumbar spinal stenosis. She has a pain management appointment scheduled for April 27th. I have agreed to continue with the #15 tablets of Percocet until this appointment. We again discussed that she needs to have a UTox performed as part of the pain contract.     Plan:  1. Rx'ed #15 Percocet with 0 refills at today's visit  2. Utox ordered.

## 2018-03-20 NOTE — ASSESSMENT & PLAN NOTE
I will reach out to Dr. Blackwell's office to request a rescheduled appointment since she does not wish to proceed with epidural injections, but wishes to discuss surgical treatment options with Dr. Blackwell.

## 2018-03-20 NOTE — ASSESSMENT & PLAN NOTE
"Ms. Richardson is a 63 yo F with MS since 1995 who had depressive symptoms on interferons, and injection site reactions on Copaxone, whose disease remains relatively stable off of disease modifying therapy, with chronic neuropathy and spasticity that remains unchanged. We agreed to repeat her MRI imaging in 6 months, which would be in April 2018. However, right now her most disabling issue is chronic low back pain from lumbar spinal stenosis. She has requested that I speak with Dr. Blackwell's office regarding her recent visit where she was suggested to have an epidural injection. However, she has already had this in the past and is not interested in pursuing this as a \"band aid\" that will likely just prolong her pain. I told her I would discuss with his team. Once this issue is addressed, we can focus on her MS. If there is new disease activity, we decided to revisit the issue of disease modifying therapy.    Plan:  1. Will repeat MRI imaging after lumbar spine is addressed  2. If MRI's show new disease activity, will revisit discussion re: disease modifying therapy.   "

## 2018-03-20 NOTE — ASSESSMENT & PLAN NOTE
She has had recent falls. We discussed the importance of fall prevention and I encouraged her to utilize a cane or a walker. I provided another prescription for a rollator walker, which she said she would obtain.

## 2018-03-20 NOTE — ASSESSMENT & PLAN NOTE
Currently controlled with current regimen of Valiuum and Soma.     Medications refilled at today's visit.   Valiuum 10mg #30 with 2 refills  Soma 350mg #120 with 2 refills

## 2018-04-16 ENCOUNTER — OFFICE VISIT (OUTPATIENT)
Dept: NEUROLOGY | Facility: MEDICAL CENTER | Age: 63
End: 2018-04-16
Payer: MEDICARE

## 2018-04-16 VITALS
HEART RATE: 100 BPM | SYSTOLIC BLOOD PRESSURE: 118 MMHG | OXYGEN SATURATION: 96 % | WEIGHT: 274 LBS | BODY MASS INDEX: 48.54 KG/M2 | TEMPERATURE: 97 F | DIASTOLIC BLOOD PRESSURE: 78 MMHG

## 2018-04-16 DIAGNOSIS — M54.42 CHRONIC BILATERAL LOW BACK PAIN WITH BILATERAL SCIATICA: ICD-10-CM

## 2018-04-16 DIAGNOSIS — M54.41 CHRONIC BILATERAL LOW BACK PAIN WITH BILATERAL SCIATICA: ICD-10-CM

## 2018-04-16 DIAGNOSIS — G35 MULTIPLE SCLEROSIS (HCC): ICD-10-CM

## 2018-04-16 DIAGNOSIS — G89.29 CHRONIC BILATERAL LOW BACK PAIN WITH BILATERAL SCIATICA: ICD-10-CM

## 2018-04-16 DIAGNOSIS — R25.2 SPASTICITY: ICD-10-CM

## 2018-04-16 DIAGNOSIS — M48.07 NEUROFORAMINAL STENOSIS OF LUMBOSACRAL SPINE: ICD-10-CM

## 2018-04-16 PROCEDURE — 99215 OFFICE O/P EST HI 40 MIN: CPT | Performed by: PSYCHIATRY & NEUROLOGY

## 2018-04-16 RX ORDER — OXYCODONE AND ACETAMINOPHEN 10; 325 MG/1; MG/1
1-2 TABLET ORAL EVERY 4 HOURS PRN
Qty: 15 TAB | Refills: 0 | Status: SHIPPED | OUTPATIENT
Start: 2018-04-16 | End: 2018-05-16

## 2018-04-16 RX ORDER — DIAZEPAM 10 MG/1
10 TABLET ORAL EVERY 6 HOURS PRN
Qty: 30 TAB | Refills: 3 | Status: SHIPPED | OUTPATIENT
Start: 2018-04-16 | End: 2018-05-16

## 2018-04-16 RX ORDER — CARISOPRODOL 350 MG/1
350 TABLET ORAL 4 TIMES DAILY
Qty: 120 TAB | Refills: 3 | Status: SHIPPED | OUTPATIENT
Start: 2018-04-16 | End: 2018-05-16

## 2018-04-16 ASSESSMENT — PATIENT HEALTH QUESTIONNAIRE - PHQ9: CLINICAL INTERPRETATION OF PHQ2 SCORE: 0

## 2018-04-16 NOTE — PROGRESS NOTES
CC: Multiple Sclerosis       HPI:    Regina Richardson is a 62 y.o. female who presents today in neurologic follow up for multiple sclerosis. The patient is not currently on any disease modifying treatments. Since our last visit, she returned to see neurosurgeon, Dr. Torsten Brenner, who evaluated the patient and recommended L3-S1 laminectomies and medial facetectomies, L3-S1 trans foraminal inter-body fusions with L3-S1 segmental instrumentation, and possible L4-S1 intradural exploration. He had commented that there was possibly arachnoiditis versus an intramural cyst between L4-S1 and ordered a CT myelogram with L2-3 contrast injection into the thecal space.     She had a myelogram done at [facility] and imaging, and the report mentions that there appears to be arachnoid scarring in the area of the cauda equina. Dr. brenner head discussed this with the patient, and she was alarmed to hear that there may actually be a mass or tumor present. Her surgery is scheduled for May 14th at [facility]. She will see his PA on the 4th. She tells me that she has stopped smoking since her appointment with Dr. Brenner.    Since our last visit, she has adopted the use of a walker. She has mainly been using it around the house. While it does make her feel somewhat uncoordinated, she feels it does give her additional support, and she denies any falls. She continues to derive benefit from the Soma and Diazepam for her spasticity, as well as Percocet 10-325mg for her back pain. She tells me that the appointment for her pain management was moved back pending her upcoming surgery.       MS History:  Diagnosed: 1995  First presentation: 1991 [facility]  Disease modifying treatments: Betaseron (had suicidal ideations). Copaxone. Avonex.   Former or other neurologist:   Last attack:   Last MRI:  10/6/17 brain, cervical, thoracic w/wo    ROS:   Constitutional: No fevers or chills.  Eyes: No blurry vision or eye pain.  ENT: No dysphagia or hearing  loss.  Respiratory: No cough or shortness of breath.  Cardiovascular: No chest pain or palpitations.  GI: No nausea, vomiting, or diarrhea.  : No urinary incontinence or dysuria.  Musculoskeletal: No joint swelling or arthralgias.  Skin: No skin rashes.  Neuro: No headaches, dizziness, or tremors.  Endocrine: No heat or cold intolerance. No polydipsia or polyuria.  Psych: No depression or anxiety.  Heme/Lymph: No easy bruising or swollen lymph nodes.  All other review of systems were reviewed and were negative.     Past Medical History:   Past Medical History:   Diagnosis Date   • Graves disease    • Head ache    • Lumbar spinal stenosis    • Multiple sclerosis (CMS-HCC)    • Muscle disorder    • Ocular migraine        Past Surgical History: History reviewed. No pertinent surgical history.    Social History:   Social History     Social History   • Marital status:      Spouse name: N/A   • Number of children: N/A   • Years of education: N/A     Occupational History   • Not on file.     Social History Main Topics   • Smoking status: Former Smoker     Years: 15.00     Quit date: 2014   • Smokeless tobacco: Never Used   • Alcohol use No   • Drug use: No   • Sexual activity: Not on file     Other Topics Concern   • Not on file     Social History Narrative   • No narrative on file       Family Hx:   Family History   Problem Relation Age of Onset   • Autoimmune Disease Daughter      MS   • Other Daughter      fibromyalgia       Current Medications:   Current Outpatient Prescriptions:   •  carisoprodol (SOMA) 350 MG Tab, Take 1 Tab by mouth 4 times a day for 30 days., Disp: 120 Tab, Rfl: 3  •  diazepam (VALIUM) 10 MG tablet, Take 1 Tab by mouth every 6 hours as needed for Anxiety for up to 30 days., Disp: 30 Tab, Rfl: 3  •  oxyCODONE-acetaminophen (PERCOCET-10)  MG Tab, Take 1-2 Tabs by mouth every four hours as needed for Severe Pain for up to 30 days., Disp: 15 Tab, Rfl: 0  •  baclofen (LIORESAL) 10 MG Tab,  Take 1 Tab by mouth 3 times a day., Disp: 30 Tab, Rfl: 3  •  levothyroxine (SYNTHROID) 150 MCG Tab, TAKE 1 TABLET(S) BY MOUTH DAILY, Disp: , Rfl: 6  •  atorvastatin (LIPITOR) 10 MG Tab, TAKE 1 TABLET ORALLY EVERY EVENING FOR CHOLESTEROL, Disp: , Rfl: 5  •  metoprolol SR (TOPROL XL) 25 MG TABLET SR 24 HR, , Disp: , Rfl:   •  metoprolol (LOPRESSOR) 25 MG Tab, Take 25 mg by mouth 2 times a day., Disp: , Rfl: 3    Allergies:   Allergies   Allergen Reactions   • Bee Venom          Physical Exam:   Encounter Vitals  Blood Pressure: 118/78  Temperature: 36.1 °C (97 °F)  Pulse: 100  Pulse Oximetry: 96 %  Weight: 124.3 kg (274 lb)  Encounter Vitals  Temperature: 36.1 °C (97 °F)  Temp Source: Temporal  Blood Pressure: 118/78  BP Location: Left, Upper Arm  Pulse: 100  Pulse Oximetry: 96 %  Weight: 124.3 kg (274 lb)  How Weight Obtained: Stand Up Scale    Constitutional:  female with good hygiene. Appears stated age.  Cardiovascular: RRR, with no murmurs, rubs or gallops. No carotid bruits. No peripheral edema, pedal pulses intact.   Respiratory: Lungs CTA B/L, no W/R/R.   Skin: Warm, dry, intact. No rashes observed.  Eyes:               Funduscopic: Optic discs flat with no evidence of papilledema or pallor. Normal posterior segments.  Neurologic:              Mental Status: Awake, alert, oriented x 3.              Speech: Fluent with normal prosody.              Memory: Able to recall 3 words at 1 minute and 5 minutes. Able to recall recent and remote events accurately.               Concentration: Attentive. Able to focus on history and follow multi-step commands.              Fund of Knowledge: Appropriate.              Cranial Nerves:                          CN II: PERRL. No afferent pupillary defect.                          CN III, IV, VI: Good eye closure, EOM marked by broken saccades.                           CN V: Facial sensation decreased on the left.                           CN VII: No facial  asymmetry.                           CN VIII: Hearing intact.                           CN IX and X: Palate elevates symmetrically. Normal gag reflex.                          CN XI: Symmetric shoulder shrug.                           CN XII: Tongue midline.               Sensory: Decreased vibration to the level of the upper extremities bilaterally. Temperature decreased on the left compared to the right.                Coordination: No evidence of past-pointing on finger to nose testing.           DTR's: 3+ in the bilateral biceps, triceps. Unable to elicit in the right knee, 1+ in the left, 1+ in bilateral ankles.              Babinski: Toes mute bilaterally.              Romberg: Deferred.              Movements: No tremors observed.   Musculoskeletal:               Strength: Right hip flexor 2/5, left hip flexor 3/5. Right knee flexor/extensor 3/5, left 2/5. Left foot drop. Right ankle DF/PF 4/5.                Gait: Broad based and unsteady. Had difficulty transferring from chair to exam table.              Tone: Normal bulk and tone.              Joints: No swelling.     Imaging:   [place] [facility] Myelogram - will report not available for my review    Assessment/Plan:  Multiple sclerosis (CMS-HCC)  62-year-old female with multiple sclerosis since 1995, not currently on any disease modifying treatments, having discontinued injectable therapy with Avonex and Copaxone due to side effects. Currently her MS is stable, but she is unable to determine what symptoms are related to her lumbosacral stenosis, and what symptoms are due to her multiple sclerosis. I appreciate Dr. brenner's recommendations, and we'll be following along closely with her as she recovers from her surgery. I discussed with her that she will likely need to undergo physical therapy afterwards.    Plan:  1. No repeat imaging until she has recovered from her spinal surgery.    Neuroforaminal stenosis of lumbosacral spine  Currently under the care of  Dr. Torsten brenner in scheduled for low back surgery on May 14.    Chronic bilateral low back pain with bilateral sciatica  Prescription for Percocet 10/325 refilled #15, paper prescription provided today, with 0 refills.  Hopefully she will have less pain after her surgery, but if she does need pain management,  our agreement was that I would stop prescribing her narcotic pain medications and she would see a pain management specialist.    Spasticity  Medications refilled at today's visit:  Valium 10 mg #30 with 2 refills  Soma 350 mg #120 with 2 refills      Sofia Mejias D.O., M.P.H  MS specialist.   Board Certified Neurologist.  Neurology Clerkship Director, [facility].    Neurology,  [facility].   Tel: [phone]  Fax: [phone]

## 2018-04-17 NOTE — ASSESSMENT & PLAN NOTE
62-year-old female with multiple sclerosis since 1995, not currently on any disease modifying treatments, having discontinued injectable therapy with Avonex and Copaxone due to side effects. Currently her MS is stable, but she is unable to determine what symptoms are related to her lumbosacral stenosis, and what symptoms are due to her multiple sclerosis. I appreciate Dr. brenner's recommendations, and we'll be following along closely with her as she recovers from her surgery. I discussed with her that she will likely need to undergo physical therapy afterwards.    Plan:  1. No repeat imaging until she has recovered from her spinal surgery.

## 2018-04-17 NOTE — ASSESSMENT & PLAN NOTE
Prescription for Percocet 10/325 refilled #15, paper prescription provided today, with 0 refills.  Hopefully she will have less pain after her surgery, but if she does need pain management,  our agreement was that I would stop prescribing her narcotic pain medications and she would see a pain management specialist.

## 2018-05-02 ENCOUNTER — HOSPITAL ENCOUNTER (OUTPATIENT)
Dept: RADIOLOGY | Facility: MEDICAL CENTER | Age: 63
DRG: 459 | End: 2018-05-02
Attending: NEUROLOGICAL SURGERY | Admitting: NEUROLOGICAL SURGERY
Payer: MEDICARE

## 2018-05-02 DIAGNOSIS — Z01.812 PRE-OPERATIVE LABORATORY EXAMINATION: ICD-10-CM

## 2018-05-02 DIAGNOSIS — Z01.811 PRE-OPERATIVE RESPIRATORY EXAMINATION: ICD-10-CM

## 2018-05-02 DIAGNOSIS — Z01.810 PRE-OPERATIVE CARDIOVASCULAR EXAMINATION: ICD-10-CM

## 2018-05-02 LAB
ANION GAP SERPL CALC-SCNC: 7 MMOL/L (ref 0–11.9)
APTT PPP: 30.1 SEC (ref 24.7–36)
BASOPHILS # BLD AUTO: 0.4 % (ref 0–1.8)
BASOPHILS # BLD: 0.04 K/UL (ref 0–0.12)
BUN SERPL-MCNC: 15 MG/DL (ref 8–22)
CALCIUM SERPL-MCNC: 10.1 MG/DL (ref 8.5–10.5)
CHLORIDE SERPL-SCNC: 106 MMOL/L (ref 96–112)
CO2 SERPL-SCNC: 28 MMOL/L (ref 20–33)
CREAT SERPL-MCNC: 0.89 MG/DL (ref 0.5–1.4)
EKG IMPRESSION: NORMAL
EOSINOPHIL # BLD AUTO: 0.17 K/UL (ref 0–0.51)
EOSINOPHIL NFR BLD: 1.7 % (ref 0–6.9)
ERYTHROCYTE [DISTWIDTH] IN BLOOD BY AUTOMATED COUNT: 47.7 FL (ref 35.9–50)
GLUCOSE SERPL-MCNC: 111 MG/DL (ref 65–99)
HCT VFR BLD AUTO: 49.2 % (ref 37–47)
HGB BLD-MCNC: 16.3 G/DL (ref 12–16)
IMM GRANULOCYTES # BLD AUTO: 0.03 K/UL (ref 0–0.11)
IMM GRANULOCYTES NFR BLD AUTO: 0.3 % (ref 0–0.9)
INR PPP: 0.98 (ref 0.87–1.13)
LYMPHOCYTES # BLD AUTO: 2.12 K/UL (ref 1–4.8)
LYMPHOCYTES NFR BLD: 20.9 % (ref 22–41)
MCH RBC QN AUTO: 31.2 PG (ref 27–33)
MCHC RBC AUTO-ENTMCNC: 33.1 G/DL (ref 33.6–35)
MCV RBC AUTO: 94.3 FL (ref 81.4–97.8)
MONOCYTES # BLD AUTO: 0.47 K/UL (ref 0–0.85)
MONOCYTES NFR BLD AUTO: 4.6 % (ref 0–13.4)
NEUTROPHILS # BLD AUTO: 7.31 K/UL (ref 2–7.15)
NEUTROPHILS NFR BLD: 72.1 % (ref 44–72)
NRBC # BLD AUTO: 0 K/UL
NRBC BLD-RTO: 0 /100 WBC
PLATELET # BLD AUTO: 319 K/UL (ref 164–446)
PMV BLD AUTO: 10.9 FL (ref 9–12.9)
POTASSIUM SERPL-SCNC: 4.2 MMOL/L (ref 3.6–5.5)
PROTHROMBIN TIME: 12.7 SEC (ref 12–14.6)
RBC # BLD AUTO: 5.22 M/UL (ref 4.2–5.4)
SODIUM SERPL-SCNC: 141 MMOL/L (ref 135–145)
WBC # BLD AUTO: 10.1 K/UL (ref 4.8–10.8)

## 2018-05-02 PROCEDURE — 85730 THROMBOPLASTIN TIME PARTIAL: CPT

## 2018-05-02 PROCEDURE — 36415 COLL VENOUS BLD VENIPUNCTURE: CPT

## 2018-05-02 PROCEDURE — 80048 BASIC METABOLIC PNL TOTAL CA: CPT

## 2018-05-02 PROCEDURE — 85610 PROTHROMBIN TIME: CPT

## 2018-05-02 PROCEDURE — 71045 X-RAY EXAM CHEST 1 VIEW: CPT

## 2018-05-02 PROCEDURE — 93005 ELECTROCARDIOGRAM TRACING: CPT

## 2018-05-02 PROCEDURE — 85025 COMPLETE CBC W/AUTO DIFF WBC: CPT

## 2018-05-02 PROCEDURE — 93010 ELECTROCARDIOGRAM REPORT: CPT | Performed by: INTERNAL MEDICINE

## 2018-05-18 ENCOUNTER — APPOINTMENT (OUTPATIENT)
Dept: RADIOLOGY | Facility: MEDICAL CENTER | Age: 63
DRG: 459 | End: 2018-05-18
Attending: NEUROLOGICAL SURGERY
Payer: MEDICARE

## 2018-05-18 ENCOUNTER — HOSPITAL ENCOUNTER (INPATIENT)
Facility: MEDICAL CENTER | Age: 63
LOS: 6 days | DRG: 459 | End: 2018-05-24
Attending: NEUROLOGICAL SURGERY | Admitting: NEUROLOGICAL SURGERY
Payer: MEDICARE

## 2018-05-18 DIAGNOSIS — M48.07 NEUROFORAMINAL STENOSIS OF LUMBOSACRAL SPINE: ICD-10-CM

## 2018-05-18 DIAGNOSIS — G35 MULTIPLE SCLEROSIS (HCC): ICD-10-CM

## 2018-05-18 DIAGNOSIS — R26.81 GAIT INSTABILITY: ICD-10-CM

## 2018-05-18 PROCEDURE — 700111 HCHG RX REV CODE 636 W/ 250 OVERRIDE (IP): Performed by: PHYSICIAN ASSISTANT

## 2018-05-18 PROCEDURE — L8699 PROSTHETIC IMPLANT NOS: HCPCS | Performed by: NEUROLOGICAL SURGERY

## 2018-05-18 PROCEDURE — 01NB0ZZ RELEASE LUMBAR NERVE, OPEN APPROACH: ICD-10-PCS | Performed by: NEUROLOGICAL SURGERY

## 2018-05-18 PROCEDURE — 500331 HCHG COTTONOID, SURG PATTIE: Performed by: NEUROLOGICAL SURGERY

## 2018-05-18 PROCEDURE — A4314 CATH W/DRAINAGE 2-WAY LATEX: HCPCS | Performed by: NEUROLOGICAL SURGERY

## 2018-05-18 PROCEDURE — 700111 HCHG RX REV CODE 636 W/ 250 OVERRIDE (IP)

## 2018-05-18 PROCEDURE — 160048 HCHG OR STATISTICAL LEVEL 1-5: Performed by: NEUROLOGICAL SURGERY

## 2018-05-18 PROCEDURE — 500444 HCHG HEMOSTAT, SURGICEL 2X3: Performed by: NEUROLOGICAL SURGERY

## 2018-05-18 PROCEDURE — 95925 SOMATOSENSORY TESTING: CPT | Performed by: NEUROLOGICAL SURGERY

## 2018-05-18 PROCEDURE — 160035 HCHG PACU - 1ST 60 MINS PHASE I: Performed by: NEUROLOGICAL SURGERY

## 2018-05-18 PROCEDURE — 95861 NEEDLE EMG 2 EXTREMITIES: CPT | Performed by: NEUROLOGICAL SURGERY

## 2018-05-18 PROCEDURE — 0SG3071 FUSION OF LUMBOSACRAL JOINT WITH AUTOLOGOUS TISSUE SUBSTITUTE, POSTERIOR APPROACH, POSTERIOR COLUMN, OPEN APPROACH: ICD-10-PCS | Performed by: NEUROLOGICAL SURGERY

## 2018-05-18 PROCEDURE — 01NR0ZZ RELEASE SACRAL NERVE, OPEN APPROACH: ICD-10-PCS | Performed by: NEUROLOGICAL SURGERY

## 2018-05-18 PROCEDURE — 700102 HCHG RX REV CODE 250 W/ 637 OVERRIDE(OP)

## 2018-05-18 PROCEDURE — 770001 HCHG ROOM/CARE - MED/SURG/GYN PRIV*

## 2018-05-18 PROCEDURE — 700105 HCHG RX REV CODE 258

## 2018-05-18 PROCEDURE — 72020 X-RAY EXAM OF SPINE 1 VIEW: CPT

## 2018-05-18 PROCEDURE — 160042 HCHG SURGERY MINUTES - EA ADDL 1 MIN LEVEL 5: Performed by: NEUROLOGICAL SURGERY

## 2018-05-18 PROCEDURE — 160002 HCHG RECOVERY MINUTES (STAT): Performed by: NEUROLOGICAL SURGERY

## 2018-05-18 PROCEDURE — 700112 HCHG RX REV CODE 229: Performed by: PHYSICIAN ASSISTANT

## 2018-05-18 PROCEDURE — 500389 HCHG DRAIN, RESERVOIR SUCT JP 100CC: Performed by: NEUROLOGICAL SURGERY

## 2018-05-18 PROCEDURE — 700102 HCHG RX REV CODE 250 W/ 637 OVERRIDE(OP): Performed by: PHYSICIAN ASSISTANT

## 2018-05-18 PROCEDURE — 500373 HCHG DRAIN, J-P FLAT 10MM 7044: Performed by: NEUROLOGICAL SURGERY

## 2018-05-18 PROCEDURE — 700101 HCHG RX REV CODE 250

## 2018-05-18 PROCEDURE — A6402 STERILE GAUZE <= 16 SQ IN: HCPCS | Performed by: NEUROLOGICAL SURGERY

## 2018-05-18 PROCEDURE — 95910 NRV CNDJ TEST 7-8 STUDIES: CPT | Performed by: NEUROLOGICAL SURGERY

## 2018-05-18 PROCEDURE — 110454 HCHG SHELL REV 250: Performed by: NEUROLOGICAL SURGERY

## 2018-05-18 PROCEDURE — 500367 HCHG DRAIN KIT, HEMOVAC: Performed by: NEUROLOGICAL SURGERY

## 2018-05-18 PROCEDURE — 160009 HCHG ANES TIME/MIN: Performed by: NEUROLOGICAL SURGERY

## 2018-05-18 PROCEDURE — 160036 HCHG PACU - EA ADDL 30 MINS PHASE I: Performed by: NEUROLOGICAL SURGERY

## 2018-05-18 PROCEDURE — 501838 HCHG SUTURE GENERAL: Performed by: NEUROLOGICAL SURGERY

## 2018-05-18 PROCEDURE — A9270 NON-COVERED ITEM OR SERVICE: HCPCS | Performed by: PHYSICIAN ASSISTANT

## 2018-05-18 PROCEDURE — 700105 HCHG RX REV CODE 258: Performed by: PHYSICIAN ASSISTANT

## 2018-05-18 PROCEDURE — 95940 IONM IN OPERATNG ROOM 15 MIN: CPT | Performed by: NEUROLOGICAL SURGERY

## 2018-05-18 PROCEDURE — 0SG1071 FUSION OF 2 OR MORE LUMBAR VERTEBRAL JOINTS WITH AUTOLOGOUS TISSUE SUBSTITUTE, POSTERIOR APPROACH, POSTERIOR COLUMN, OPEN APPROACH: ICD-10-PCS | Performed by: NEUROLOGICAL SURGERY

## 2018-05-18 PROCEDURE — A9270 NON-COVERED ITEM OR SERVICE: HCPCS

## 2018-05-18 PROCEDURE — 503195 HCHG SEALER, BIPOLAR AQUAMANTYS: Performed by: NEUROLOGICAL SURGERY

## 2018-05-18 PROCEDURE — 500122 HCHG BOVIE, BLADE: Performed by: NEUROLOGICAL SURGERY

## 2018-05-18 PROCEDURE — 160031 HCHG SURGERY MINUTES - 1ST 30 MINS LEVEL 5: Performed by: NEUROLOGICAL SURGERY

## 2018-05-18 PROCEDURE — 700101 HCHG RX REV CODE 250: Performed by: NEUROLOGICAL SURGERY

## 2018-05-18 DEVICE — PUTTY MASTERGRAFT 6CC: Type: IMPLANTABLE DEVICE | Site: SPINE LUMBAR | Status: FUNCTIONAL

## 2018-05-18 RX ORDER — SODIUM CHLORIDE 9 MG/ML
INJECTION, SOLUTION INTRAVENOUS
Status: COMPLETED
Start: 2018-05-18 | End: 2018-05-18

## 2018-05-18 RX ORDER — ONDANSETRON 2 MG/ML
INJECTION INTRAMUSCULAR; INTRAVENOUS
Status: COMPLETED
Start: 2018-05-18 | End: 2018-05-18

## 2018-05-18 RX ORDER — ACETAMINOPHEN 500 MG
500 TABLET ORAL EVERY 6 HOURS PRN
Status: DISCONTINUED | OUTPATIENT
Start: 2018-05-18 | End: 2018-05-24 | Stop reason: HOSPADM

## 2018-05-18 RX ORDER — ENEMA 19; 7 G/133ML; G/133ML
1 ENEMA RECTAL
Status: DISCONTINUED | OUTPATIENT
Start: 2018-05-18 | End: 2018-05-24 | Stop reason: HOSPADM

## 2018-05-18 RX ORDER — DIPHENHYDRAMINE HCL 25 MG
25 TABLET ORAL EVERY 6 HOURS PRN
Status: DISCONTINUED | OUTPATIENT
Start: 2018-05-18 | End: 2018-05-24 | Stop reason: HOSPADM

## 2018-05-18 RX ORDER — SODIUM CHLORIDE 9 MG/ML
INJECTION, SOLUTION INTRAVENOUS CONTINUOUS
Status: DISCONTINUED | OUTPATIENT
Start: 2018-05-18 | End: 2018-05-22

## 2018-05-18 RX ORDER — AMOXICILLIN 250 MG
1 CAPSULE ORAL
Status: DISCONTINUED | OUTPATIENT
Start: 2018-05-18 | End: 2018-05-24 | Stop reason: HOSPADM

## 2018-05-18 RX ORDER — ONDANSETRON 2 MG/ML
4 INJECTION INTRAMUSCULAR; INTRAVENOUS EVERY 4 HOURS PRN
Status: DISCONTINUED | OUTPATIENT
Start: 2018-05-18 | End: 2018-05-24 | Stop reason: HOSPADM

## 2018-05-18 RX ORDER — SODIUM CHLORIDE, SODIUM LACTATE, POTASSIUM CHLORIDE, CALCIUM CHLORIDE 600; 310; 30; 20 MG/100ML; MG/100ML; MG/100ML; MG/100ML
INJECTION, SOLUTION INTRAVENOUS CONTINUOUS
Status: DISCONTINUED | OUTPATIENT
Start: 2018-05-18 | End: 2018-05-24

## 2018-05-18 RX ORDER — MIDAZOLAM HYDROCHLORIDE 1 MG/ML
INJECTION INTRAMUSCULAR; INTRAVENOUS
Status: DISPENSED
Start: 2018-05-18 | End: 2018-05-19

## 2018-05-18 RX ORDER — LIDOCAINE HYDROCHLORIDE 10 MG/ML
0.5 INJECTION, SOLUTION INFILTRATION; PERINEURAL
Status: DISPENSED | OUTPATIENT
Start: 2018-05-18 | End: 2018-05-19

## 2018-05-18 RX ORDER — MIDAZOLAM HYDROCHLORIDE 1 MG/ML
INJECTION INTRAMUSCULAR; INTRAVENOUS
Status: COMPLETED
Start: 2018-05-18 | End: 2018-05-18

## 2018-05-18 RX ORDER — ONDANSETRON 4 MG/1
4 TABLET, ORALLY DISINTEGRATING ORAL EVERY 4 HOURS PRN
Status: DISCONTINUED | OUTPATIENT
Start: 2018-05-18 | End: 2018-05-24 | Stop reason: HOSPADM

## 2018-05-18 RX ORDER — BACITRACIN 50000 [IU]/1
INJECTION, POWDER, FOR SOLUTION INTRAMUSCULAR
Status: DISCONTINUED | OUTPATIENT
Start: 2018-05-18 | End: 2018-05-18 | Stop reason: HOSPADM

## 2018-05-18 RX ORDER — SODIUM CHLORIDE, SODIUM LACTATE, POTASSIUM CHLORIDE, AND CALCIUM CHLORIDE .6; .31; .03; .02 G/100ML; G/100ML; G/100ML; G/100ML
IRRIGANT IRRIGATION
Status: DISCONTINUED | OUTPATIENT
Start: 2018-05-18 | End: 2018-05-18 | Stop reason: HOSPADM

## 2018-05-18 RX ORDER — PROMETHAZINE HYDROCHLORIDE 25 MG/1
12.5-25 TABLET ORAL EVERY 4 HOURS PRN
Status: DISCONTINUED | OUTPATIENT
Start: 2018-05-18 | End: 2018-05-24 | Stop reason: HOSPADM

## 2018-05-18 RX ORDER — PROMETHAZINE HYDROCHLORIDE 25 MG/1
12.5-25 SUPPOSITORY RECTAL EVERY 4 HOURS PRN
Status: DISCONTINUED | OUTPATIENT
Start: 2018-05-18 | End: 2018-05-24 | Stop reason: HOSPADM

## 2018-05-18 RX ORDER — POLYETHYLENE GLYCOL 3350 17 G/17G
1 POWDER, FOR SOLUTION ORAL 2 TIMES DAILY PRN
Status: DISCONTINUED | OUTPATIENT
Start: 2018-05-18 | End: 2018-05-24 | Stop reason: HOSPADM

## 2018-05-18 RX ORDER — HYDROCODONE BITARTRATE AND ACETAMINOPHEN 10; 325 MG/1; MG/1
1-2 TABLET ORAL EVERY 4 HOURS PRN
Status: DISCONTINUED | OUTPATIENT
Start: 2018-05-18 | End: 2018-05-24 | Stop reason: HOSPADM

## 2018-05-18 RX ORDER — DOCUSATE SODIUM 100 MG/1
100 CAPSULE, LIQUID FILLED ORAL 2 TIMES DAILY
Status: DISCONTINUED | OUTPATIENT
Start: 2018-05-18 | End: 2018-05-24 | Stop reason: HOSPADM

## 2018-05-18 RX ORDER — DIPHENHYDRAMINE HYDROCHLORIDE 50 MG/ML
25 INJECTION INTRAMUSCULAR; INTRAVENOUS EVERY 6 HOURS PRN
Status: DISCONTINUED | OUTPATIENT
Start: 2018-05-18 | End: 2018-05-24 | Stop reason: HOSPADM

## 2018-05-18 RX ORDER — CLONIDINE HYDROCHLORIDE 0.1 MG/1
0.1 TABLET ORAL EVERY 4 HOURS PRN
Status: DISCONTINUED | OUTPATIENT
Start: 2018-05-18 | End: 2018-05-24 | Stop reason: HOSPADM

## 2018-05-18 RX ORDER — OXYCODONE HCL 5 MG/5 ML
SOLUTION, ORAL ORAL
Status: COMPLETED
Start: 2018-05-18 | End: 2018-05-18

## 2018-05-18 RX ORDER — OXYCODONE AND ACETAMINOPHEN 10; 325 MG/1; MG/1
1-2 TABLET ORAL EVERY 4 HOURS
Status: DISCONTINUED | OUTPATIENT
Start: 2018-05-19 | End: 2018-05-19

## 2018-05-18 RX ORDER — HALOPERIDOL 5 MG/ML
INJECTION INTRAMUSCULAR
Status: COMPLETED
Start: 2018-05-18 | End: 2018-05-18

## 2018-05-18 RX ORDER — ACETAMINOPHEN 500 MG
TABLET ORAL
Status: COMPLETED
Start: 2018-05-18 | End: 2018-05-18

## 2018-05-18 RX ORDER — CEFAZOLIN SODIUM 2 G/100ML
2 INJECTION, SOLUTION INTRAVENOUS EVERY 8 HOURS
Status: COMPLETED | OUTPATIENT
Start: 2018-05-18 | End: 2018-05-19

## 2018-05-18 RX ORDER — SCOLOPAMINE TRANSDERMAL SYSTEM 1 MG/1
PATCH, EXTENDED RELEASE TRANSDERMAL
Status: COMPLETED
Start: 2018-05-18 | End: 2018-05-18

## 2018-05-18 RX ORDER — GABAPENTIN 300 MG/1
CAPSULE ORAL
Status: COMPLETED
Start: 2018-05-18 | End: 2018-05-18

## 2018-05-18 RX ORDER — OXYCODONE HCL 20 MG/1
TABLET, FILM COATED, EXTENDED RELEASE ORAL
Status: COMPLETED
Start: 2018-05-18 | End: 2018-05-18

## 2018-05-18 RX ORDER — LEVOTHYROXINE SODIUM 0.15 MG/1
150 TABLET ORAL
Status: DISCONTINUED | OUTPATIENT
Start: 2018-05-19 | End: 2018-05-24 | Stop reason: HOSPADM

## 2018-05-18 RX ORDER — LIDOCAINE HYDROCHLORIDE 10 MG/ML
INJECTION, SOLUTION EPIDURAL; INFILTRATION; INTRACAUDAL; PERINEURAL
Status: DISPENSED
Start: 2018-05-18 | End: 2018-05-19

## 2018-05-18 RX ORDER — DIPHENHYDRAMINE HCL 25 MG
25 TABLET ORAL NIGHTLY PRN
COMMUNITY
End: 2021-04-09

## 2018-05-18 RX ORDER — BISACODYL 10 MG
10 SUPPOSITORY, RECTAL RECTAL
Status: DISCONTINUED | OUTPATIENT
Start: 2018-05-18 | End: 2018-05-21

## 2018-05-18 RX ORDER — CARISOPRODOL 350 MG/1
350 TABLET ORAL 4 TIMES DAILY
Status: DISCONTINUED | OUTPATIENT
Start: 2018-05-18 | End: 2018-05-19

## 2018-05-18 RX ORDER — BUPIVACAINE HYDROCHLORIDE AND EPINEPHRINE 5; 5 MG/ML; UG/ML
INJECTION, SOLUTION EPIDURAL; INTRACAUDAL; PERINEURAL
Status: DISCONTINUED | OUTPATIENT
Start: 2018-05-18 | End: 2018-05-18 | Stop reason: HOSPADM

## 2018-05-18 RX ORDER — CALCIUM CARBONATE 500 MG/1
500 TABLET, CHEWABLE ORAL 2 TIMES DAILY
Status: DISCONTINUED | OUTPATIENT
Start: 2018-05-18 | End: 2018-05-24 | Stop reason: HOSPADM

## 2018-05-18 RX ORDER — METHOCARBAMOL 750 MG/1
TABLET, FILM COATED ORAL
Status: COMPLETED
Start: 2018-05-18 | End: 2018-05-18

## 2018-05-18 RX ORDER — AMOXICILLIN 250 MG
1 CAPSULE ORAL NIGHTLY
Status: DISCONTINUED | OUTPATIENT
Start: 2018-05-18 | End: 2018-05-24 | Stop reason: HOSPADM

## 2018-05-18 RX ADMIN — METHOCARBAMOL 750 MG: 750 TABLET ORAL at 17:15

## 2018-05-18 RX ADMIN — ONDANSETRON 4 MG: 2 INJECTION INTRAMUSCULAR; INTRAVENOUS at 17:30

## 2018-05-18 RX ADMIN — FENTANYL CITRATE 25 MCG: 50 INJECTION, SOLUTION INTRAMUSCULAR; INTRAVENOUS at 16:15

## 2018-05-18 RX ADMIN — ACETAMINOPHEN 1000 MG: 500 TABLET, FILM COATED ORAL at 13:15

## 2018-05-18 RX ADMIN — HALOPERIDOL LACTATE 1 MG: 5 INJECTION, SOLUTION INTRAMUSCULAR at 18:55

## 2018-05-18 RX ADMIN — MIDAZOLAM 0.5 MG: 1 INJECTION INTRAMUSCULAR; INTRAVENOUS at 18:35

## 2018-05-18 RX ADMIN — SCOPOLAMINE 1 PATCH: 1 PATCH, EXTENDED RELEASE TRANSDERMAL at 13:20

## 2018-05-18 RX ADMIN — SODIUM CHLORIDE: 9 INJECTION, SOLUTION INTRAVENOUS at 17:00

## 2018-05-18 RX ADMIN — CARISOPRODOL 350 MG: 350 TABLET ORAL at 22:08

## 2018-05-18 RX ADMIN — FENTANYL CITRATE 75 MCG: 50 INJECTION, SOLUTION INTRAMUSCULAR; INTRAVENOUS at 16:50

## 2018-05-18 RX ADMIN — MIDAZOLAM 0.5 MG: 1 INJECTION INTRAMUSCULAR; INTRAVENOUS at 16:45

## 2018-05-18 RX ADMIN — CEFAZOLIN SODIUM 2 G: 2 INJECTION, SOLUTION INTRAVENOUS at 22:12

## 2018-05-18 RX ADMIN — MIDAZOLAM 0.5 MG: 1 INJECTION INTRAMUSCULAR; INTRAVENOUS at 16:40

## 2018-05-18 RX ADMIN — OXYCODONE HYDROCHLORIDE 20 MG: 20 TABLET, FILM COATED, EXTENDED RELEASE ORAL at 13:20

## 2018-05-18 RX ADMIN — OXYCODONE HYDROCHLORIDE 10 MG: 5 SOLUTION ORAL at 16:15

## 2018-05-18 RX ADMIN — ANTACID TABLETS 500 MG: 500 TABLET, CHEWABLE ORAL at 22:09

## 2018-05-18 RX ADMIN — FENTANYL CITRATE 75 MCG: 50 INJECTION, SOLUTION INTRAMUSCULAR; INTRAVENOUS at 16:35

## 2018-05-18 RX ADMIN — HYDROMORPHONE HYDROCHLORIDE 0.5 MG: 10 INJECTION, SOLUTION INTRAMUSCULAR; INTRAVENOUS; SUBCUTANEOUS at 17:15

## 2018-05-18 RX ADMIN — HALOPERIDOL LACTATE 1 MG: 5 INJECTION, SOLUTION INTRAMUSCULAR at 19:00

## 2018-05-18 RX ADMIN — HYDROMORPHONE HYDROCHLORIDE 0.5 MG: 10 INJECTION, SOLUTION INTRAMUSCULAR; INTRAVENOUS; SUBCUTANEOUS at 17:20

## 2018-05-18 RX ADMIN — FENTANYL CITRATE 50 MCG: 50 INJECTION, SOLUTION INTRAMUSCULAR; INTRAVENOUS at 16:25

## 2018-05-18 RX ADMIN — FENTANYL CITRATE 25 MCG: 50 INJECTION, SOLUTION INTRAMUSCULAR; INTRAVENOUS at 16:10

## 2018-05-18 RX ADMIN — DOCUSATE SODIUM 100 MG: 100 CAPSULE ORAL at 22:09

## 2018-05-18 RX ADMIN — STANDARDIZED SENNA CONCENTRATE AND DOCUSATE SODIUM 1 TABLET: 8.6; 5 TABLET, FILM COATED ORAL at 22:08

## 2018-05-18 RX ADMIN — GABAPENTIN 300 MG: 300 CAPSULE ORAL at 13:20

## 2018-05-18 RX ADMIN — METHOCARBAMOL 1000 MG: 100 INJECTION INTRAMUSCULAR; INTRAVENOUS at 18:06

## 2018-05-18 ASSESSMENT — PAIN SCALES - GENERAL
PAINLEVEL_OUTOF10: 9
PAINLEVEL_OUTOF10: 5
PAINLEVEL_OUTOF10: 7
PAINLEVEL_OUTOF10: 6
PAINLEVEL_OUTOF10: 4
PAINLEVEL_OUTOF10: 9
PAINLEVEL_OUTOF10: 8
PAINLEVEL_OUTOF10: 9
PAINLEVEL_OUTOF10: 6
PAINLEVEL_OUTOF10: 4
PAINLEVEL_OUTOF10: 5
PAINLEVEL_OUTOF10: 9
PAINLEVEL_OUTOF10: 7
PAINLEVEL_OUTOF10: 9
PAINLEVEL_OUTOF10: 5
PAINLEVEL_OUTOF10: 6
PAINLEVEL_OUTOF10: 6
PAINLEVEL_OUTOF10: 9
PAINLEVEL_OUTOF10: 6
PAINLEVEL_OUTOF10: 5
PAINLEVEL_OUTOF10: 6

## 2018-05-18 ASSESSMENT — COPD QUESTIONNAIRES
COPD SCREENING SCORE: 2
DURING THE PAST 4 WEEKS HOW MUCH DID YOU FEEL SHORT OF BREATH: NONE/LITTLE OF THE TIME
HAVE YOU SMOKED AT LEAST 100 CIGARETTES IN YOUR ENTIRE LIFE: NO/DON'T KNOW
DO YOU EVER COUGH UP ANY MUCUS OR PHLEGM?: NO/ONLY WITH OCCASIONAL COLDS OR INFECTIONS

## 2018-05-18 NOTE — OP REPORT
DATE OF SERVICE:  05/18/2018    PREOPERATIVE DIAGNOSIS:  L3-S1 severe stenosis with facet arthropathy.    POSTOPERATIVE DIAGNOSIS:  L3-S1 severe stenosis with facet arthropathy.    PRINCIPAL PROCEDURE PERFORMED:  1.  L3, L4, L5, S1 decompressive laminectomy, bilateral medial facetectomies   and bilateral foraminotomies.  2.  L3-L4, L4-L5, L5-S1 posterolateral onlay fusion.    SURGEON:  Torsten Blackwell MD    ASSISTANT:  Carl Conrad MD    ANESTHESIA:  Performed under general anesthesia by Dr. Ranjit Greer.    COMPLICATIONS:  There were no complications.    FINDINGS:  Include evidence of severe stenosis from L4-S1 with no evidence of   gross instability requiring instrumentation.  Please note the SSEPs and EMGs   were stable throughout the case.    For IV fluids, urine output, estimated blood loss, please see the anesthesia   record.    DISPOSITION:  Patient will be extubated and brought to the recovery room.    CLINICAL HISTORY:  The patient is a 62-year-old female who presents with   neurogenic claudication and back pain.  She was consented for surgery.  She   had underestimated lumbar stenosis on her MRI.  I ordered a CAT scan   myelogram.  If the stenosis did not look so bad, I was thinking about doing   intradural exploration given the MRI findings.  Also, if I created iatrogenic   instability, I consented the patient for L3-S1 fusion globally.  Risks,   benefits and options were discussed.  Patient signed written informed consent.    DESCRIPTION OF PROCEDURE:  She was brought to the operating room and placed   under general anesthesia.  Please note that a 22 modifier was added given the   patient's BMI of 46.  She is 5 feet 3 inches in height and weighs 265 pounds.    This gives a BMI of 46.  This required extra time and expertise.  Regardless,   a Seals catheter was placed.  Medial and sticky electrodes were placed.  For   the neural monitoring, see an IM technician Yamile.  The patient was  turned   prone atop a radiolucent OSI table.  All pressure points were padded.  The   back was prepped and draped in usual sterile fashion.  A time-out was   performed.  Local anesthetic was infiltrated in the skin.  ____ were placed on   the fascia.  The fascia was opened.  A sharp subperiosteal dissection was   performed.  Intraoperative fluoroscopy used to demonstrate the correct level.    I used an AMA drill bit to create gutters just medial to the facet complexes   from L3 to S1.  Inferior spinous process of L3, and the spinous processes of   L4, L5 and S1 were removed.  I used a Kerrison 2 and Kerrison 3 punch and   Leksell rongeur to complete decompressive laminectomies, bilateral medial   facetectomies and bilateral foraminotomies with L3, L4, L5 and S1.  Bilateral   L3, bilateral L4, bilateral L5, bilateral S1 nerve roots were completely   decompressed.  There is no evidence of gross instability.  However, given the   severity of the stenosis, I felt that the onlay fusion was reasonable.  The   medial transverse processes were decorticated and I placed local autograft and   also a tricalcium phosphate MasterGraft for an L3-S1 posterolateral fusion.    Perfect hemostasis was achieved.  There was no need for an intradural   exploration.  Perfect hemostasis was achieved.  The wound was closed in   anatomic layers and a sterile dressing was applied.    Please note, I will contact Sofia Mejias DO to thank her so much.       ____________________________________     MD POOJA LOUISE / TINY    DD:  05/18/2018 15:40:42  DT:  05/18/2018 16:16:57    D#:  [id number]  Job#:  [id number]    cc: Sofia Mejias DO

## 2018-05-18 NOTE — OR SURGEON
Immediate Post OP Note    PreOp Diagnosis: L3-S1 severe stenosis, facet arthropathy    PostOp Diagnosis: same    Procedure(s):  POSTERIOR LUMBAR LAMINECTOMY 3 - SACRAL 1 - Wound Class: Clean with Drain  POSTERIOR LUMBAR 3 - SACRAL 1 ONLAY BONE FUSION WITH ALLOGRAFT - Wound Class: Clean with Drain    Surgeon(s):  DAMASO Stoddard M.D.    Anesthesiologist/Type of Anesthesia:  Anesthesiologist: Ranjit Greer M.D./General    Surgical Staff:  Circulator: Tamara Trevizo R.N.  Scrub Person: Tan Hyatt  Radiology Technologist: Stephenie Schaefer    Specimens removed if any:  * No specimens in log *    Estimated Blood Loss: 250 cc    Findings: severe stenosis relieved, stable ssep's,emg's    Complications: none        5/18/2018 3:33 PM Torsten Blackwell M.D.

## 2018-05-19 PROCEDURE — G8988 SELF CARE GOAL STATUS: HCPCS | Mod: CI

## 2018-05-19 PROCEDURE — A9270 NON-COVERED ITEM OR SERVICE: HCPCS | Performed by: PHYSICIAN ASSISTANT

## 2018-05-19 PROCEDURE — G8978 MOBILITY CURRENT STATUS: HCPCS | Mod: CJ

## 2018-05-19 PROCEDURE — G8979 MOBILITY GOAL STATUS: HCPCS | Mod: CI

## 2018-05-19 PROCEDURE — 770001 HCHG ROOM/CARE - MED/SURG/GYN PRIV*

## 2018-05-19 PROCEDURE — A9270 NON-COVERED ITEM OR SERVICE: HCPCS | Performed by: NEUROLOGICAL SURGERY

## 2018-05-19 PROCEDURE — G8987 SELF CARE CURRENT STATUS: HCPCS | Mod: CI

## 2018-05-19 PROCEDURE — 700105 HCHG RX REV CODE 258: Performed by: PHYSICIAN ASSISTANT

## 2018-05-19 PROCEDURE — 700112 HCHG RX REV CODE 229: Performed by: PHYSICIAN ASSISTANT

## 2018-05-19 PROCEDURE — G8989 SELF CARE D/C STATUS: HCPCS | Mod: CI

## 2018-05-19 PROCEDURE — 700102 HCHG RX REV CODE 250 W/ 637 OVERRIDE(OP): Performed by: PHYSICIAN ASSISTANT

## 2018-05-19 PROCEDURE — 700102 HCHG RX REV CODE 250 W/ 637 OVERRIDE(OP): Performed by: NEUROLOGICAL SURGERY

## 2018-05-19 PROCEDURE — 97165 OT EVAL LOW COMPLEX 30 MIN: CPT

## 2018-05-19 PROCEDURE — 97161 PT EVAL LOW COMPLEX 20 MIN: CPT

## 2018-05-19 PROCEDURE — 700111 HCHG RX REV CODE 636 W/ 250 OVERRIDE (IP): Performed by: PHYSICIAN ASSISTANT

## 2018-05-19 RX ORDER — CARISOPRODOL 350 MG/1
350 TABLET ORAL EVERY 6 HOURS PRN
Status: DISCONTINUED | OUTPATIENT
Start: 2018-05-19 | End: 2018-05-24 | Stop reason: HOSPADM

## 2018-05-19 RX ORDER — OXYCODONE HYDROCHLORIDE 10 MG/1
10 TABLET ORAL EVERY 4 HOURS PRN
Status: DISCONTINUED | OUTPATIENT
Start: 2018-05-19 | End: 2018-05-24 | Stop reason: HOSPADM

## 2018-05-19 RX ADMIN — POLYETHYLENE GLYCOL (3350) 1 PACKET: 17 POWDER, FOR SOLUTION ORAL at 09:12

## 2018-05-19 RX ADMIN — LEVOTHYROXINE SODIUM 150 MCG: 150 TABLET ORAL at 06:33

## 2018-05-19 RX ADMIN — CARISOPRODOL 350 MG: 350 TABLET ORAL at 11:38

## 2018-05-19 RX ADMIN — OXYCODONE HYDROCHLORIDE 10 MG: 10 TABLET ORAL at 20:46

## 2018-05-19 RX ADMIN — OXYCODONE HYDROCHLORIDE 10 MG: 10 TABLET ORAL at 09:15

## 2018-05-19 RX ADMIN — OXYCODONE HYDROCHLORIDE AND ACETAMINOPHEN 2 TABLET: 10; 325 TABLET ORAL at 00:22

## 2018-05-19 RX ADMIN — ANTACID TABLETS 500 MG: 500 TABLET, CHEWABLE ORAL at 09:15

## 2018-05-19 RX ADMIN — DOCUSATE SODIUM 100 MG: 100 CAPSULE ORAL at 21:28

## 2018-05-19 RX ADMIN — STANDARDIZED SENNA CONCENTRATE AND DOCUSATE SODIUM 1 TABLET: 8.6; 5 TABLET, FILM COATED ORAL at 21:27

## 2018-05-19 RX ADMIN — CARISOPRODOL 350 MG: 350 TABLET ORAL at 21:27

## 2018-05-19 RX ADMIN — OXYCODONE HYDROCHLORIDE AND ACETAMINOPHEN 2 TABLET: 10; 325 TABLET ORAL at 04:03

## 2018-05-19 RX ADMIN — ANTACID TABLETS 500 MG: 500 TABLET, CHEWABLE ORAL at 21:28

## 2018-05-19 RX ADMIN — OXYCODONE HYDROCHLORIDE 10 MG: 10 TABLET ORAL at 13:16

## 2018-05-19 RX ADMIN — VITAMIN D, TAB 1000IU (100/BT) 5000 UNITS: 25 TAB at 09:14

## 2018-05-19 RX ADMIN — MAGNESIUM HYDROXIDE 30 ML: 400 SUSPENSION ORAL at 09:13

## 2018-05-19 RX ADMIN — CEFAZOLIN SODIUM 2 G: 2 INJECTION, SOLUTION INTRAVENOUS at 06:33

## 2018-05-19 RX ADMIN — HYDROCODONE BITARTRATE AND ACETAMINOPHEN 2 TABLET: 10; 325 TABLET ORAL at 16:26

## 2018-05-19 RX ADMIN — SODIUM CHLORIDE: 9 INJECTION, SOLUTION INTRAVENOUS at 04:05

## 2018-05-19 RX ADMIN — DOCUSATE SODIUM 100 MG: 100 CAPSULE ORAL at 09:15

## 2018-05-19 RX ADMIN — HYDROCODONE BITARTRATE AND ACETAMINOPHEN 2 TABLET: 10; 325 TABLET ORAL at 23:03

## 2018-05-19 ASSESSMENT — COGNITIVE AND FUNCTIONAL STATUS - GENERAL
CLIMB 3 TO 5 STEPS WITH RAILING: A LITTLE
SUGGESTED CMS G CODE MODIFIER DAILY ACTIVITY: CH
WALKING IN HOSPITAL ROOM: A LITTLE
MOBILITY SCORE: 22
DAILY ACTIVITIY SCORE: 24
SUGGESTED CMS G CODE MODIFIER MOBILITY: CJ

## 2018-05-19 ASSESSMENT — GAIT ASSESSMENTS
ASSISTIVE DEVICE: FRONT WHEEL WALKER
GAIT LEVEL OF ASSIST: SUPERVISED
DISTANCE (FEET): 100

## 2018-05-19 ASSESSMENT — PAIN SCALES - GENERAL
PAINLEVEL_OUTOF10: 10
PAINLEVEL_OUTOF10: 10
PAINLEVEL_OUTOF10: 9
PAINLEVEL_OUTOF10: 9
PAINLEVEL_OUTOF10: 7
PAINLEVEL_OUTOF10: 6
PAINLEVEL_OUTOF10: 8
PAINLEVEL_OUTOF10: 6
PAINLEVEL_OUTOF10: 7
PAINLEVEL_OUTOF10: 7
PAINLEVEL_OUTOF10: 8

## 2018-05-19 ASSESSMENT — PATIENT HEALTH QUESTIONNAIRE - PHQ9
1. LITTLE INTEREST OR PLEASURE IN DOING THINGS: NOT AT ALL
2. FEELING DOWN, DEPRESSED, IRRITABLE, OR HOPELESS: NOT AT ALL
SUM OF ALL RESPONSES TO PHQ9 QUESTIONS 1 AND 2: 0
1. LITTLE INTEREST OR PLEASURE IN DOING THINGS: NOT AT ALL
2. FEELING DOWN, DEPRESSED, IRRITABLE, OR HOPELESS: NOT AT ALL
SUM OF ALL RESPONSES TO PHQ9 QUESTIONS 1 AND 2: 0

## 2018-05-19 ASSESSMENT — LIFESTYLE VARIABLES
EVER_SMOKED: NEVER
EVER_SMOKED: NEVER
ALCOHOL_USE: NO

## 2018-05-19 ASSESSMENT — ACTIVITIES OF DAILY LIVING (ADL): TOILETING: INDEPENDENT

## 2018-05-19 NOTE — OR NURSING
Pt A&OX4. VSS. Pt afebrile. Pt on 3 L of oxygen via nasal cannula. Surgical incision to lower back CDI. JAN drain in place, 30 cc of sanguinous drainage out, and Hemovac in place, 20 cc of sanguinous drainage out. Both drains compressed to suction. Pt states pain tolerable at 6/10 post pain medication administration per MAR and anesthesia orders. Pt initially restless due to spasms and unable to find comfortable position, IV Robaxin administered. Pt complained of nausea, IV Zofran and IV Haldol administered. Cold pack to lower back in place. IV maintenance fluids initiated per MAR. Report called to CARLTON Velazquez. Pt out of PACU with transport.

## 2018-05-19 NOTE — CARE PLAN
Problem: Safety  Goal: Will remain free from injury  Outcome: PROGRESSING AS EXPECTED        Patient educated on safety and risks, Bed alarmed, mobility assessed., appropriate signs in place, Call light in reach, pt calls appropriately Hourly rounds, will continue to assess    Problem: Infection  Goal: Will remain free from infection  Outcome: PROGRESSING AS EXPECTED      No signs of infection, Pt ao4, voiding well, VSS, afebrile, incision CDI, educated on s/s of infetion and to ensure that staff implements hand hygiene.    Problem: Pain Management  Goal: Pain level will decrease to patient's comfort goal  Outcome: PROGRESSING AS EXPECTED    Patient assessed for pain. Educated patient on both non-pharmacological and pharmacological interventions together with risks included. Patient verbalized understanding.    Patient reports comfort from intervention. Patient calls for assistance when needed. No noted cardiac, respiratory, or neurological distress.

## 2018-05-19 NOTE — THERAPY
"61 y/o female adm for lumbar stenosis s/p L3-S1 fusion, with LSO. Intact motor and sensory components BLE. Acute PT to address gait and stair negotiation for pt to achieve higher level of function for DC home.    Physical Therapy Evaluation completed.   Bed Mobility:  Supine to Sit: Supervised  Transfers: Sit to Stand: Stand by Assist  Gait: Level Of Assist: Supervised with Front-Wheel Walker       Plan of Care: Will benefit from Physical Therapy 5 times per week  Discharge Recommendations: Equipment: Will Continue to Assess for Equipment Needs. Post-acute therapy Discharge to home with outpatient or home health for additional skilled therapy services.    See \"Rehab Therapy-Acute\" Patient Summary Report for complete documentation.     "

## 2018-05-19 NOTE — OR NURSING
Pt continues to state pain severe with back spasms despite pain medication administration per anesthesia orders. Pt requesting Soma for muscle spasms. Dr. Blackwell's office paged. Call back from RAFAEL Bell, and updated. New orders received.

## 2018-05-19 NOTE — PROGRESS NOTES
Neurosurgery Progress Note    Subjective:  Ambulatory  LLE pain and strength improved from preop   Seals in, passing flatus  Pain well controlled on oral medications  Denies new pain, numbness, weakness.   Denies HA, positional HA, N/V, dizziness, chest pain, SOB, difficulty breathing, chills    Exam:  VSS  A&Ox4, NAD  NM: 5/5 hip flexion, knee extension, knee flexion, plantar flexion, dorsiflexion, EHL except left EHL and TA 4+   Sensation intact and equal throughout all four extremities.   Abdomen: soft, non-tender  Non-labored breathing on room air, normal respiratory effort  Capillary refill in all four extremities <2 seconds  No LE edema, erythema, cyanosis, clubbing  Calves non-tender to compression bilat  Incision CDI, no halo sign  Drain: 2 intact       BP  Min: 97/70  Max: 141/77  Pulse  Av  Min: 68  Max: 92  Resp  Avg: 15.7  Min: 11  Max: 25  Temp  Av.5 °C (97.7 °F)  Min: 36.1 °C (96.9 °F)  Max: 36.8 °C (98.2 °F)  SpO2  Av.3 %  Min: 91 %  Max: 100 %    No Data Recorded                      Intake/Output       18 - 18 0659 18 - 18 0659       Total  Total       Intake    P.O.  --  1980  --  -- --    P.O. -- 1980 -- -- --    I.V.  1500  1400 2900  --  -- --    Crystalloid Intake 2366 359 8325 -- -- --    IV Piggyback Volume (IV Piggyback) -- 100 100 -- -- --    IV Volume (< Enter Name Here >) -- 1100 1100 -- -- --    Other  --  10 10  --  -- --    Medications (P.O./ Enteral Liquids) -- 10 10 -- -- --    Total Intake 1500 3390 4890 -- -- --       Output    Urine  30  650 680  --  -- --    Urine Void (mL) (non-catheter) 30 -- 30 -- -- --    Output (mL) (Urinary Catheter Indwelling Catheter 16 fr) -- 650 650 -- -- --    Drains  --  125 125  --  -- --    Output (ml) (Surgical Drain Group Back Dave Friend 1 (A)) -- 50 50 -- -- --    Output (ml) (Surgical Drain Group Back Hemovac 2 (B)) -- 75 75 -- -- --    Blood  250   -- 250  --  -- --    Est. Blood Loss (mL) 250 -- 250 -- -- --    Total Output  -- -- --       Net I/O     1220 2615 3835 -- -- --            Intake/Output Summary (Last 24 hours) at 05/19/18 1022  Last data filed at 05/19/18 0600   Gross per 24 hour   Intake             4890 ml   Output             1055 ml   Net             3835 ml            • carisoprodol  350 mg Q6HRS PRN   • oxyCODONE immediate-release  10 mg Q4HRS PRN   • lidocaine  0.5 mL Once PRN   • LR   Continuous   • levothyroxine  150 mcg AM ES   • Pharmacy Consult Request  1 Each PRN   • MD ALERT...Do not administer NSAIDS or ASPIRIN unless ORDERED By Neurosurgery  1 Each PRN   • docusate sodium  100 mg BID   • senna-docusate  1 Tab Nightly   • senna-docusate  1 Tab Q24HRS PRN   • polyethylene glycol/lytes  1 Packet BID PRN   • magnesium hydroxide  30 mL QDAY PRN   • bisacodyl  10 mg Q24HRS PRN   • fleet  1 Each Once PRN   • NS   Continuous   • acetaminophen  500 mg Q6HRS PRN   • diphenhydrAMINE  25 mg Q6HRS PRN    Or   • diphenhydrAMINE  25 mg Q6HRS PRN   • ondansetron  4 mg Q4HRS PRN   • ondansetron  4 mg Q4HRS PRN   • promethazine  12.5-25 mg Q4HRS PRN   • promethazine  12.5-25 mg Q4HRS PRN   • prochlorperazine  5-10 mg Q4HRS PRN   • cloNIDine  0.1 mg Q4HRS PRN   • benzocaine-menthol  1 Lozenge Q2HRS PRN   • vitamin D  5,000 Units DAILY   • calcium carbonate  500 mg BID   • HYDROcodone/acetaminophen  1-2 Tab Q4HRS PRN   • HYDROmorphone  1 mg Q4HRS PRN       Assessment and Plan:  POD #1 L3-S1 laminectomy, onlay fusion   D/c aly  D/c drains when meets criteria   Ambulate, work with therapies  Continue incentive spirometry Q1H - advised pt in room   Continue pain control  Continue stool softeners to encourage BM  Possible d/c to home on Sunday or Monday pending drain, pain control, vital signs, PT/OT     Prophylactic anticoagulation: no

## 2018-05-19 NOTE — PROGRESS NOTES
Pt arrived on the floor 2030 with family and belongings alongside,    Pt is calm and alert, denies N/T, motor strength equal, Pt breathing normal, nasal cannula in place on 3 L, Pt denies n/v, headache, no SOB, no chest pain.    Patient reports pain 8/10, medicated per MAR, tolerating well,  on.    Seals in place, output WNL. Drains (hemo and JAN) in place, output WNL.    Patient educated on safety and risks, discussed POC, bed alarmed, call light in reach, hourly rounds, will continue to monitor

## 2018-05-19 NOTE — PROGRESS NOTES
2 RN check done with Анна, no breakdown noted on bony prominences and under devices. Pt turns self, extra pillows for support in use, Incision on lower back shows no signs of injury or infection, dressing in place, CDI, Will continue to assess.

## 2018-05-19 NOTE — CARE PLAN
Problem: Safety  Goal: Will remain free from injury  Outcome: PROGRESSING AS EXPECTED  Alarms on and functioning.Tray table and call light in reach.

## 2018-05-19 NOTE — PROGRESS NOTES
Received bedside report from Caroline Ness RN.Patient shows no distress, AO4, breathing normal, Safety precautions in place. Tolerating pain medication, Will continue to assess

## 2018-05-19 NOTE — DIETARY
NUTRITION SERVICES: BMI - Pt with BMI >40 (=46.9). Weight loss counseling not appropriate in acute care setting. RECOMMEND - Referral to outpatient nutrition services for weight management after D/C.

## 2018-05-19 NOTE — PROGRESS NOTES
Pt shows no distress, AO4, breathing normal, Safety precautions in place. Tolerating pain medication, Will continue to assess

## 2018-05-20 ENCOUNTER — APPOINTMENT (OUTPATIENT)
Dept: RADIOLOGY | Facility: MEDICAL CENTER | Age: 63
DRG: 459 | End: 2018-05-20
Attending: PHYSICIAN ASSISTANT
Payer: MEDICARE

## 2018-05-20 PROCEDURE — 770001 HCHG ROOM/CARE - MED/SURG/GYN PRIV*

## 2018-05-20 PROCEDURE — A9270 NON-COVERED ITEM OR SERVICE: HCPCS | Performed by: PHYSICIAN ASSISTANT

## 2018-05-20 PROCEDURE — 700102 HCHG RX REV CODE 250 W/ 637 OVERRIDE(OP): Performed by: NEUROLOGICAL SURGERY

## 2018-05-20 PROCEDURE — 700111 HCHG RX REV CODE 636 W/ 250 OVERRIDE (IP): Performed by: PHYSICIAN ASSISTANT

## 2018-05-20 PROCEDURE — 71046 X-RAY EXAM CHEST 2 VIEWS: CPT

## 2018-05-20 PROCEDURE — 700102 HCHG RX REV CODE 250 W/ 637 OVERRIDE(OP): Performed by: PHYSICIAN ASSISTANT

## 2018-05-20 PROCEDURE — 700112 HCHG RX REV CODE 229: Performed by: PHYSICIAN ASSISTANT

## 2018-05-20 PROCEDURE — A9270 NON-COVERED ITEM OR SERVICE: HCPCS | Performed by: NEUROLOGICAL SURGERY

## 2018-05-20 RX ADMIN — STANDARDIZED SENNA CONCENTRATE AND DOCUSATE SODIUM 1 TABLET: 8.6; 5 TABLET, FILM COATED ORAL at 19:56

## 2018-05-20 RX ADMIN — OXYCODONE HYDROCHLORIDE 10 MG: 10 TABLET ORAL at 11:45

## 2018-05-20 RX ADMIN — ANTACID TABLETS 500 MG: 500 TABLET, CHEWABLE ORAL at 08:25

## 2018-05-20 RX ADMIN — HYDROMORPHONE HYDROCHLORIDE 1 MG: 10 INJECTION, SOLUTION INTRAMUSCULAR; INTRAVENOUS; SUBCUTANEOUS at 14:33

## 2018-05-20 RX ADMIN — LEVOTHYROXINE SODIUM 150 MCG: 150 TABLET ORAL at 06:01

## 2018-05-20 RX ADMIN — OXYCODONE HYDROCHLORIDE 10 MG: 10 TABLET ORAL at 07:45

## 2018-05-20 RX ADMIN — VITAMIN D, TAB 1000IU (100/BT) 5000 UNITS: 25 TAB at 08:24

## 2018-05-20 RX ADMIN — DOCUSATE SODIUM 100 MG: 100 CAPSULE ORAL at 19:56

## 2018-05-20 RX ADMIN — DOCUSATE SODIUM 100 MG: 100 CAPSULE ORAL at 08:25

## 2018-05-20 RX ADMIN — OXYCODONE HYDROCHLORIDE 10 MG: 10 TABLET ORAL at 19:56

## 2018-05-20 RX ADMIN — HYDROCODONE BITARTRATE AND ACETAMINOPHEN 2 TABLET: 10; 325 TABLET ORAL at 09:55

## 2018-05-20 RX ADMIN — ANTACID TABLETS 500 MG: 500 TABLET, CHEWABLE ORAL at 19:56

## 2018-05-20 RX ADMIN — OXYCODONE HYDROCHLORIDE 10 MG: 10 TABLET ORAL at 02:10

## 2018-05-20 RX ADMIN — MAGNESIUM HYDROXIDE 30 ML: 400 SUSPENSION ORAL at 06:01

## 2018-05-20 RX ADMIN — HYDROMORPHONE HYDROCHLORIDE 1 MG: 10 INJECTION, SOLUTION INTRAMUSCULAR; INTRAVENOUS; SUBCUTANEOUS at 08:22

## 2018-05-20 RX ADMIN — CARISOPRODOL 350 MG: 350 TABLET ORAL at 11:45

## 2018-05-20 RX ADMIN — HYDROCODONE BITARTRATE AND ACETAMINOPHEN 2 TABLET: 10; 325 TABLET ORAL at 04:22

## 2018-05-20 RX ADMIN — CARISOPRODOL 350 MG: 350 TABLET ORAL at 06:04

## 2018-05-20 ASSESSMENT — PAIN SCALES - GENERAL
PAINLEVEL_OUTOF10: 10
PAINLEVEL_OUTOF10: 6
PAINLEVEL_OUTOF10: 8
PAINLEVEL_OUTOF10: 10
PAINLEVEL_OUTOF10: 10
PAINLEVEL_OUTOF10: 9
PAINLEVEL_OUTOF10: 8
PAINLEVEL_OUTOF10: 9
PAINLEVEL_OUTOF10: 0
PAINLEVEL_OUTOF10: 6
PAINLEVEL_OUTOF10: 9
PAINLEVEL_OUTOF10: 7
PAINLEVEL_OUTOF10: 10
PAINLEVEL_OUTOF10: 0

## 2018-05-20 NOTE — CARE PLAN
Problem: Safety  Goal: Will remain free from falls  Outcome: PROGRESSING AS EXPECTED      Pt remains free from injury, safety precautions in place, bed alarm refused, pt calls appropriate, call light in reach, hourly rounds, will continue to assess    Problem: Infection  Goal: Will remain free from infection  Outcome: PROGRESSING AS EXPECTED    VSS, afebrile, drain WNL, voids well, breathing well, AO4, incision CDI    Problem: Pain Management  Goal: Pain level will decrease to patient's comfort goal  Outcome: PROGRESSING AS EXPECTED      Problem: Mobility  Goal: Risk for activity intolerance will decrease  Outcome: PROGRESSING AS EXPECTED      Ambulated with RN

## 2018-05-20 NOTE — FACE TO FACE
Face to Face Supporting Documentation - Home Health    The encounter with this patient was in whole or in part the primary reason for home health admission.    Date of encounter:   Patient:                    MRN:                       YOB: 2016  Regina Richardson  [id number]  1955     Home health to see patient for:  Skilled Nursing care for assessment, interventions & education, Home health aide, Physical Therapy evaluation and treatment and Occupational therapy evaluation and treatment    Skilled need for:  Surgical Aftercare lumbar surgery    Skilled nursing interventions to include:  Wound Care    Homebound status evidenced by:  Need the aid of supportive devices such as crutches, canes, wheelchairs or walkers. Leaving home requires a considerable and taxing effort. There is a normal inability to leave the home.    Community Physician to provide follow up care: Nathalie Augilera D.O.     Optional Interventions? No      I certify the face to face encounter for this home health care referral meets the CMS requirements and the encounter/clinical assessment with the patient was, in whole, or in part, for the medical condition(s) listed above, which is the primary reason for home health care. Based on my clinical findings: the service(s) are medically necessary, support the need for home health care, and the homebound criteria are met.  I certify that this patient has had a face to face encounter by myself.  Erica Goetz P.A.-C. - NPI: [id number]

## 2018-05-20 NOTE — PROGRESS NOTES
Received report from night shift RN and assumed care. Pt is having pain a lot and says she has had a difficult time sleeping because of it. POC reviewed and call light within reach.

## 2018-05-20 NOTE — DISCHARGE PLANNING
Medical Social Work    Referral:  Review    Intervention:  Pt is new to the floor.  SW reviewed pt's chart.  SW needs TBD    Plan:  SW will remain available for dc planning

## 2018-05-20 NOTE — PROGRESS NOTES
Neurosurgery Progress Note    Subjective:  Ambulatory with wheelchair  Seals out, voiding  Passing gas  No leg pain, numbness, tingling, weakness   Pain well controlled on oral medications  Denies new pain, numbness, weakness.   Denies HA, positional HA, N/V, dizziness, chest pain, SOB, difficulty breathing, chills    Exam:  VSS with O2 3L via NC, Desaturates to 88% off supplemental O2   A&Ox4, NAD  NM: 5/5 hip flexion, knee extension, knee flexion, plantar flexion, dorsiflexion, EHL   Sensation intact and equal throughout all four extremities.   Abdomen: soft, non-tender  Non-labored breathing on room air, normal respiratory effort  Capillary refill in all four extremities <2 seconds  No LE edema, erythema, cyanosis, clubbing  Calves non-tender to compression bilat  Incision CDI, no halo sign  Drain: 2 intact       BP  Min: 92/43  Max: 109/58  Pulse  Av  Min: 59  Max: 77  Resp  Av  Min: 18  Max: 18  Temp  Av.6 °C (97.9 °F)  Min: 36.5 °C (97.7 °F)  Max: 36.9 °C (98.4 °F)  SpO2  Av.3 %  Min: 94 %  Max: 100 %    No Data Recorded                      Intake/Output       18 0700 - 18 0659 18 07 - 18 0659      6622-4011 9768-7865 Total  4304-2390 Total       Intake    Total Intake -- -- -- -- -- --       Output    Urine  400  200 600  --  -- --    Number of Times Voided 1 x 1 x 2 x -- -- --    Urine Void (mL) (non-catheter) 400 200 600 -- -- --    Drains  80  160 240  --  -- --    Output (ml) (Surgical Drain Group Back Dave Friend 1 (A)) 30 100 130 -- -- --    Output (ml) (Surgical Drain Group Back Hemovac 2 (B)) 50 60 110 -- -- --    Total Output 855 054 840 -- -- --       Net I/O     -314 -360 840 -- -- --            Intake/Output Summary (Last 24 hours) at 18 1037  Last data filed at 18 0400   Gross per 24 hour   Intake                0 ml   Output              840 ml   Net             -840 ml       $ Bladder Scan Results (mL): 22    • carisoprodol   350 mg Q6HRS PRN   • oxyCODONE immediate-release  10 mg Q4HRS PRN   • LR   Continuous   • levothyroxine  150 mcg AM ES   • Pharmacy Consult Request  1 Each PRN   • MD ALERT...Do not administer NSAIDS or ASPIRIN unless ORDERED By Neurosurgery  1 Each PRN   • docusate sodium  100 mg BID   • senna-docusate  1 Tab Nightly   • senna-docusate  1 Tab Q24HRS PRN   • polyethylene glycol/lytes  1 Packet BID PRN   • magnesium hydroxide  30 mL QDAY PRN   • bisacodyl  10 mg Q24HRS PRN   • fleet  1 Each Once PRN   • NS   Continuous   • acetaminophen  500 mg Q6HRS PRN   • diphenhydrAMINE  25 mg Q6HRS PRN    Or   • diphenhydrAMINE  25 mg Q6HRS PRN   • ondansetron  4 mg Q4HRS PRN   • ondansetron  4 mg Q4HRS PRN   • promethazine  12.5-25 mg Q4HRS PRN   • promethazine  12.5-25 mg Q4HRS PRN   • prochlorperazine  5-10 mg Q4HRS PRN   • cloNIDine  0.1 mg Q4HRS PRN   • benzocaine-menthol  1 Lozenge Q2HRS PRN   • vitamin D  5,000 Units DAILY   • calcium carbonate  500 mg BID   • HYDROcodone/acetaminophen  1-2 Tab Q4HRS PRN   • HYDROmorphone  1 mg Q4HRS PRN       Assessment and Plan:  POD #2 L3-S1 laminectomy, onlay fusion   D/c drains when meets criteria   Ambulate, work with therapies  Continue incentive spirometry Q1H - advised pt in room   Continue pain control  Continue stool softeners to encourage BM, add suppository today  Discussed pain control - pt declined other msk relaxers other than Soma, steroids, gabapentin, cymbalta, other narcotics other than oxycodone.   Will add ice/non-pharmacologic modalities, pt states she feels better when ambulatory  Recommend monitoring O2 saturation while ambulatory  Will order CXR today   Possible d/c tomorrow with HH pending Vitals, drain, pain     Prophylactic anticoagulation: no

## 2018-05-20 NOTE — PROGRESS NOTES
Received report from am RNVannessa,  at bedside, accepted care . Pt is calm and alert, shows no signs of distress. Negative for headache, no N/V, Pt is breathing normally. No SOB, no, chest pain. Present bowel sounds and pt passing gas. Bowel protocol in place. No needs at this time. POC discussed. Bed in low position call bell within reach, half side rails up, Bed alarm refused, educated on safety and risks, pt calls appropriately. Pt resting quietly. Will continue to assess.

## 2018-05-21 PROBLEM — J18.9 PNEUMONIA: Status: ACTIVE | Noted: 2018-05-21

## 2018-05-21 PROBLEM — R09.02 HYPOXIA: Status: ACTIVE | Noted: 2018-05-21

## 2018-05-21 LAB
ERYTHROCYTE [DISTWIDTH] IN BLOOD BY AUTOMATED COUNT: 49 FL (ref 35.9–50)
HCT VFR BLD AUTO: 39.9 % (ref 37–47)
HGB BLD-MCNC: 12.4 G/DL (ref 12–16)
LACTATE BLD-SCNC: 0.9 MMOL/L (ref 0.5–2)
MCH RBC QN AUTO: 30.1 PG (ref 27–33)
MCHC RBC AUTO-ENTMCNC: 31.1 G/DL (ref 33.6–35)
MCV RBC AUTO: 96.8 FL (ref 81.4–97.8)
PLATELET # BLD AUTO: 279 K/UL (ref 164–446)
PMV BLD AUTO: 10.9 FL (ref 9–12.9)
PROCALCITONIN SERPL-MCNC: <0.05 NG/ML
RBC # BLD AUTO: 4.12 M/UL (ref 4.2–5.4)
WBC # BLD AUTO: 11.1 K/UL (ref 4.8–10.8)

## 2018-05-21 PROCEDURE — 36415 COLL VENOUS BLD VENIPUNCTURE: CPT

## 2018-05-21 PROCEDURE — G8979 MOBILITY GOAL STATUS: HCPCS | Mod: CI

## 2018-05-21 PROCEDURE — 700112 HCHG RX REV CODE 229: Performed by: PHYSICIAN ASSISTANT

## 2018-05-21 PROCEDURE — 700102 HCHG RX REV CODE 250 W/ 637 OVERRIDE(OP): Performed by: INTERNAL MEDICINE

## 2018-05-21 PROCEDURE — 83605 ASSAY OF LACTIC ACID: CPT

## 2018-05-21 PROCEDURE — G8980 MOBILITY D/C STATUS: HCPCS | Mod: CI

## 2018-05-21 PROCEDURE — 97530 THERAPEUTIC ACTIVITIES: CPT

## 2018-05-21 PROCEDURE — A9270 NON-COVERED ITEM OR SERVICE: HCPCS | Performed by: INTERNAL MEDICINE

## 2018-05-21 PROCEDURE — 99222 1ST HOSP IP/OBS MODERATE 55: CPT | Mod: AI | Performed by: INTERNAL MEDICINE

## 2018-05-21 PROCEDURE — 770001 HCHG ROOM/CARE - MED/SURG/GYN PRIV*

## 2018-05-21 PROCEDURE — 84145 PROCALCITONIN (PCT): CPT

## 2018-05-21 PROCEDURE — A9270 NON-COVERED ITEM OR SERVICE: HCPCS | Performed by: NEUROLOGICAL SURGERY

## 2018-05-21 PROCEDURE — 700105 HCHG RX REV CODE 258: Performed by: INTERNAL MEDICINE

## 2018-05-21 PROCEDURE — 700102 HCHG RX REV CODE 250 W/ 637 OVERRIDE(OP): Performed by: PHYSICIAN ASSISTANT

## 2018-05-21 PROCEDURE — A9270 NON-COVERED ITEM OR SERVICE: HCPCS | Performed by: PHYSICIAN ASSISTANT

## 2018-05-21 PROCEDURE — 85027 COMPLETE CBC AUTOMATED: CPT

## 2018-05-21 PROCEDURE — 700102 HCHG RX REV CODE 250 W/ 637 OVERRIDE(OP): Performed by: NEUROLOGICAL SURGERY

## 2018-05-21 PROCEDURE — 700111 HCHG RX REV CODE 636 W/ 250 OVERRIDE (IP): Performed by: PHYSICIAN ASSISTANT

## 2018-05-21 PROCEDURE — 700111 HCHG RX REV CODE 636 W/ 250 OVERRIDE (IP): Performed by: INTERNAL MEDICINE

## 2018-05-21 RX ORDER — AZITHROMYCIN 250 MG/1
500 TABLET, FILM COATED ORAL ONCE
Status: COMPLETED | OUTPATIENT
Start: 2018-05-21 | End: 2018-05-21

## 2018-05-21 RX ORDER — BISACODYL 10 MG
10 SUPPOSITORY, RECTAL RECTAL ONCE
Status: DISPENSED | OUTPATIENT
Start: 2018-05-21 | End: 2018-05-22

## 2018-05-21 RX ORDER — AZITHROMYCIN 250 MG/1
250 TABLET, FILM COATED ORAL DAILY
Status: DISCONTINUED | OUTPATIENT
Start: 2018-05-22 | End: 2018-05-24 | Stop reason: HOSPADM

## 2018-05-21 RX ADMIN — ANTACID TABLETS 500 MG: 500 TABLET, CHEWABLE ORAL at 21:50

## 2018-05-21 RX ADMIN — OXYCODONE HYDROCHLORIDE 10 MG: 10 TABLET ORAL at 04:00

## 2018-05-21 RX ADMIN — DOCUSATE SODIUM 100 MG: 100 CAPSULE ORAL at 08:01

## 2018-05-21 RX ADMIN — VITAMIN D, TAB 1000IU (100/BT) 5000 UNITS: 25 TAB at 08:00

## 2018-05-21 RX ADMIN — MAGNESIUM HYDROXIDE 30 ML: 400 SUSPENSION ORAL at 08:13

## 2018-05-21 RX ADMIN — OXYCODONE HYDROCHLORIDE 10 MG: 10 TABLET ORAL at 16:59

## 2018-05-21 RX ADMIN — DOCUSATE SODIUM 100 MG: 100 CAPSULE ORAL at 21:50

## 2018-05-21 RX ADMIN — OXYCODONE HYDROCHLORIDE 10 MG: 10 TABLET ORAL at 21:50

## 2018-05-21 RX ADMIN — ANTACID TABLETS 500 MG: 500 TABLET, CHEWABLE ORAL at 08:01

## 2018-05-21 RX ADMIN — OXYCODONE HYDROCHLORIDE 10 MG: 10 TABLET ORAL at 00:06

## 2018-05-21 RX ADMIN — STANDARDIZED SENNA CONCENTRATE AND DOCUSATE SODIUM 1 TABLET: 8.6; 5 TABLET, FILM COATED ORAL at 21:50

## 2018-05-21 RX ADMIN — OXYCODONE HYDROCHLORIDE 10 MG: 10 TABLET ORAL at 08:04

## 2018-05-21 RX ADMIN — ONDANSETRON 4 MG: 4 TABLET, ORALLY DISINTEGRATING ORAL at 12:49

## 2018-05-21 RX ADMIN — AZITHROMYCIN 500 MG: 250 TABLET, FILM COATED ORAL at 17:33

## 2018-05-21 RX ADMIN — OXYCODONE HYDROCHLORIDE 10 MG: 10 TABLET ORAL at 12:19

## 2018-05-21 RX ADMIN — LEVOTHYROXINE SODIUM 150 MCG: 150 TABLET ORAL at 06:05

## 2018-05-21 RX ADMIN — AMPICILLIN SODIUM AND SULBACTAM SODIUM 3 G: 2; 1 INJECTION, POWDER, FOR SOLUTION INTRAMUSCULAR; INTRAVENOUS at 21:50

## 2018-05-21 ASSESSMENT — PAIN SCALES - GENERAL
PAINLEVEL_OUTOF10: 8
PAINLEVEL_OUTOF10: 8
PAINLEVEL_OUTOF10: 6
PAINLEVEL_OUTOF10: 8
PAINLEVEL_OUTOF10: 9
PAINLEVEL_OUTOF10: 8
PAINLEVEL_OUTOF10: 9
PAINLEVEL_OUTOF10: 8

## 2018-05-21 ASSESSMENT — COGNITIVE AND FUNCTIONAL STATUS - GENERAL
MOVING TO AND FROM BED TO CHAIR: UNABLE
TURNING FROM BACK TO SIDE WHILE IN FLAT BAD: UNABLE
SUGGESTED CMS G CODE MODIFIER MOBILITY: CK
MOBILITY SCORE: 18

## 2018-05-21 ASSESSMENT — ENCOUNTER SYMPTOMS
DIZZINESS: 0
TINGLING: 0
VOMITING: 0
DEPRESSION: 0
NAUSEA: 0
HEADACHES: 0
COUGH: 1
SHORTNESS OF BREATH: 0
PALPITATIONS: 0
FALLS: 0
CHILLS: 0
STRIDOR: 0
MYALGIAS: 0
DIARRHEA: 0
SPUTUM PRODUCTION: 1
LOSS OF CONSCIOUSNESS: 0
BACK PAIN: 1
ABDOMINAL PAIN: 0
WEAKNESS: 0
FEVER: 0
CONSTIPATION: 0

## 2018-05-21 ASSESSMENT — GAIT ASSESSMENTS
DISTANCE (FEET): 200
ASSISTIVE DEVICE: FRONT WHEEL WALKER
DEVIATION: ANTALGIC
GAIT LEVEL OF ASSIST: SUPERVISED

## 2018-05-21 NOTE — THERAPY
"Physical Therapy Treatment completed.   Bed Mobility:  Supine to Sit: Stand by Assist (VC for log rolling; heavy reliance on bedrest )  Transfers: Sit to Stand: Supervised  Gait: Level Of Assist: Supervised with Front-Wheel Walker       Plan of Care: no further acute PT needs   Discharge Recommendations: Equipment: owns FWW     Pt with improving mobility, able to demo functional mobility required to return home with assist of ; denies physical concerns for d/c at this time though she is nauseous; education provided on HEP, activity ice vs heat and follow up PT. Pt would benefit from home health PT at discharge. No further acute PT needs at this time.     See \"Rehab Therapy-Acute\" Patient Summary Report for complete documentation.       "

## 2018-05-21 NOTE — CONSULTS
Hospital Medicine Consultation     Date of Service  5/21/2018    Chief Complaint  No chief complaint on file.      History of Presenting Illness  62 y.o. female who presented 5/18/2018 for surgery with Dr. brenner. Patient has been noted to have hypoxia, hospitalist service was asked to follow along because of this. After discussing this with the patient she states she has had this for years, she did have a sleep study approximately 2 years ago, did not have sleep apnea. Patient states sometimes he gets into the mid 80s while she is on home lying flat, she'll get somewhat lethargic which is how she knows to check it. However recently she does complain of a productive cough with dark sputum. Continues to have some back pain that is worse with movement.   Primary Care Physician  Nathalie Aguilera D.O.    Requesting physician  Dr. brenner    Code Status  Full    Review of Systems  Review of Systems   Constitutional: Negative for chills, fever and malaise/fatigue.   HENT: Negative for congestion.    Respiratory: Positive for cough and sputum production. Negative for shortness of breath and stridor.    Cardiovascular: Negative for chest pain, palpitations and leg swelling.   Gastrointestinal: Negative for abdominal pain, constipation, diarrhea, nausea and vomiting.   Genitourinary: Negative for dysuria and urgency.   Musculoskeletal: Positive for back pain. Negative for falls and myalgias.   Neurological: Negative for dizziness, tingling, loss of consciousness, weakness and headaches.   Psychiatric/Behavioral: Negative for depression and suicidal ideas.   All other systems reviewed and are negative.       Past Medical History  Past Medical History:   Diagnosis Date   • Anesthesia 05/02/2018    extreme nausea   • Graves disease 05/02/2018   • Pneumonia 2016   • Gynecological disorder    • Head ache    • Incontinence of urine in female     pt states she has urinary incontinence   • Lumbar spinal stenosis    • Multiple  sclerosis (HCC)    • Muscle disorder    • Ocular migraine    • Urinary incontinence        Surgical History  Past Surgical History:   Procedure Laterality Date   • LUMBAR LAMINECTOMY DISKECTOMY  2018    Procedure: POSTERIOR LUMBAR LAMINECTOMY 3 - SACRAL 1;  Surgeon: Torsten Blackwell M.D.;  Location: SURGERY [facility];  Service: Neurosurgery   • LUMBAR FUSION POSTERIOR  2018    Procedure: POSTERIOR LUMBAR 3 - SACRAL 1 ONLAY BONE FUSION WITH ALLOGRAFT;  Surgeon: Torsten Blackwell M.D.;  Location: SURGERY [facility];  Service: Neurosurgery   • GYN SURGERY      hysterectomy   • OTHER ABDOMINAL SURGERY      gallbladder       Medications  No current facility-administered medications on file prior to encounter.      Current Outpatient Prescriptions on File Prior to Encounter   Medication Sig Dispense Refill   • levothyroxine (SYNTHROID) 150 MCG Tab TAKE 1 TABLET(S) BY MOUTH DAILY  6       Family History  Family History   Problem Relation Age of Onset   • Autoimmune Disease Daughter      MS   • Other Daughter      fibromyalgia       Social History  Social History   Substance Use Topics   • Smoking status: Former Smoker     Years: 15.00     Quit date: 2018   • Smokeless tobacco: Never Used   • Alcohol use No       Allergies  Allergies   Allergen Reactions   • Bee Venom Anaphylaxis     Has epi pen   • Other Misc Swelling     Surgical steel   • Sulfa Drugs      As child; unknown rxn   • Tape Hives     Adhesive tape allergy        Physical Exam  Laboratory   Hemodynamics  Temp (24hrs), Av.7 °C (98.1 °F), Min:36.6 °C (97.8 °F), Max:37.1 °C (98.8 °F)   Temperature: 36.6 °C (97.9 °F)  Pulse  Av.1  Min: 59  Max: 99    Blood Pressure: 113/61      Respiratory      Respiration: 18, Pulse Oximetry: 93 %             Physical Exam   Constitutional: She is oriented to person, place, and time. She appears well-developed. No distress.   HENT:   Head: Normocephalic and atraumatic.   Mouth/Throat:  Oropharynx is clear and moist. No oropharyngeal exudate.   Eyes: Right eye exhibits no discharge. Left eye exhibits no discharge.   Neck: Neck supple. No tracheal deviation present.   Cardiovascular: Normal rate and regular rhythm.  Exam reveals no gallop and no friction rub.    No murmur heard.  Pulmonary/Chest: Effort normal. No stridor. No respiratory distress. She has no wheezes. She has rales. She exhibits no tenderness.   Abdominal: Soft. Bowel sounds are normal. She exhibits no distension. There is no tenderness.   Musculoskeletal: Normal range of motion. She exhibits edema. She exhibits no tenderness.   Lymphadenopathy:     She has no cervical adenopathy.   Neurological: She is alert and oriented to person, place, and time. No cranial nerve deficit.   Skin: Skin is warm and dry. No rash noted. She is not diaphoretic. No erythema.   Psychiatric: She has a normal mood and affect. Her behavior is normal. Judgment and thought content normal.   Nursing note and vitals reviewed.              No results for input(s): ALTSGPT, ASTSGOT, ALKPHOSPHAT, TBILIRUBIN, DBILIRUBIN, GAMMAGT, AMYLASE, LIPASE, ALB, PREALBUMIN, GLUCOSE in the last 72 hours.              No results found for: TROPONINI  Urinalysis:  No results found for: SPECGRAVITY, GLUCOSEUR, KETONES, NITRITE, WBCURINE, RBCURINE, BACTERIA, EPITHELCELL     Imaging  Chest x-ray-1.  Mildly prominent perihilar and interstitial opacifications could indicate edema bronchitis or pneumonitis.  2.  Probable discoid atelectasis in the left mid lung.  3.  No consolidations identified.   Assessment/Plan       Pneumonia   Assessment & Plan    - Concerning for pneumonia considering her symptoms and chest x-ray  - obtain a CBC, lactic acid, pro-calcitonin  - Start Unasyn and azithromycin          Hypoxia   Assessment & Plan    - Patient also complains of a productive cough, concerning for pneumonia  - Denies any chest pain, unlikely to be pulmonary embolism  - Patient states  she chronically has hypoxia when she is lying down, has had a sleep study approximately 2 years ago, did not have sleep apnea  - Obtain labs including pro-calcitonin, at this point in time I do not feel a CT of the chest is warranted however she does not improve or shows worsening signs may need to obtain one, patient is postop from 5/18, feel that even if the d-dimer was obtained it would be elevated, would not assist in this instance and likely lead to requiring CTA  - Continue to monitor oxygenation        Neuroforaminal stenosis of lumbosacral spine- (present on admission)   Assessment & Plan    - Now status post surgery, symptomatically care        Multiple sclerosis (HCC)- (present on admission)   Assessment & Plan    - Chronic, outpatient follow-up            VTE prophylaxis: SCDs .

## 2018-05-21 NOTE — ASSESSMENT & PLAN NOTE
S/p posterior L3-S1 laminectomy & fusion by Dr. Blackwell on 5/18/18.  Site is uncomplicated.    Dressing management per neurosurgery.  Follow up with neurosurgery as directed by them.

## 2018-05-21 NOTE — DISCHARGE PLANNING
Transitional Care Navigator:    TCN met with patient to discuss transitional care services for discharge planning.  Home health level of care has been recommended.  TCN explained home health level of care in detail.  Pt in agreement.  Choice is [facility].  Physical address is [address] [place] [address] [facility] completed and faxed to [facility].    SW aware.  TCN will follow-up as needed.

## 2018-05-21 NOTE — PROGRESS NOTES
AAOX4, MORROW   Denies N/T  Up with 1x    Pt complaining of central lower back pain at  9/10   ocycodine given with positive results  JAN in place, compressed   Dressing clean, dry and intact  Voiding without difficulty, MOM given, +BS  Call light within reach. Treaded socks and SCDs on

## 2018-05-21 NOTE — ASSESSMENT & PLAN NOTE
Starting to resolve.  Low suspicion of PE. Showing clinical improvement on antibiotics.  Discharge home with O2.  Continue frequent ambulation.  Continue to work with I.S.

## 2018-05-21 NOTE — PROGRESS NOTES
Neurosurgery Progress Note    Subjective:  Ambulatory with wheelchair  VOding   Passing gas, No BM   Unable to wean off O2  No leg pain, numbness, tingling, weakness   Pain well controlled on oral medications  Denies new pain, numbness, weakness.   Denies HA, positional HA, N/V, dizziness, chest pain, SOB, difficulty breathing, chills    Exam:  VSS with O2 3L via NC, Desaturates to 88% off supplemental O2   A&Ox4, NAD  NM: 5/5 hip flexion, knee extension, knee flexion, plantar flexion, dorsiflexion, EHL   Sensation intact and equal throughout all four extremities.   Abdomen: soft, non-tender  Non-labored breathing on room air, normal respiratory effort. While O2 off saturates 86-88%, with IS, increases to 90-93%. Otherwise using 3L Nc, normal respiratory effort   No cyanosis in UEs/perioral   Capillary refill in all four extremities <2 seconds  No LE edema, erythema, cyanosis, clubbing  Calves non-tender to compression bilat  Incision CDI, no halo sign  Drain: intact, minimal output.       BP  Min: 91/69  Max: 118/76  Pulse  Av.5  Min: 84  Max: 99  Resp  Av.8  Min: 17  Max: 18  Temp  Av.7 °C (98.1 °F)  Min: 36.4 °C (97.6 °F)  Max: 37.1 °C (98.8 °F)  SpO2  Av.8 %  Min: 92 %  Max: 97 %    No Data Recorded                      Intake/Output       18 - 18 0618 - 18 0659       Total  Total       Intake    Total Intake -- -- -- -- -- --       Output    Urine  --  -- --  --  -- --    Number of Times Voided 1 x -- 1 x -- -- --    Drains  70  50 120  20  -- 20    Output (ml) (Surgical Drain Group Back Dave Friend 1 (A)) 60 45 105 -- -- --    Output (ml) ([REMOVED] Surgical Drain Group Back Hemovac 2 (B)) 10 5 15 20 -- 20    Stool  --  -- --  --  -- --    Number of Times Stooled -- -- -- 0 x -- 0 x    Total Output 70 50 120 20 -- 20       Net I/O     -70 -50 -120 -20 -- -20            Intake/Output Summary (Last 24 hours) at  05/21/18 1351  Last data filed at 05/21/18 1140   Gross per 24 hour   Intake                0 ml   Output               70 ml   Net              -70 ml            • carisoprodol  350 mg Q6HRS PRN   • oxyCODONE immediate-release  10 mg Q4HRS PRN   • LR   Continuous   • levothyroxine  150 mcg AM ES   • Pharmacy Consult Request  1 Each PRN   • MD ALERT...Do not administer NSAIDS or ASPIRIN unless ORDERED By Neurosurgery  1 Each PRN   • docusate sodium  100 mg BID   • senna-docusate  1 Tab Nightly   • senna-docusate  1 Tab Q24HRS PRN   • polyethylene glycol/lytes  1 Packet BID PRN   • magnesium hydroxide  30 mL QDAY PRN   • bisacodyl  10 mg Q24HRS PRN   • fleet  1 Each Once PRN   • NS   Continuous   • acetaminophen  500 mg Q6HRS PRN   • diphenhydrAMINE  25 mg Q6HRS PRN    Or   • diphenhydrAMINE  25 mg Q6HRS PRN   • ondansetron  4 mg Q4HRS PRN   • ondansetron  4 mg Q4HRS PRN   • promethazine  12.5-25 mg Q4HRS PRN   • promethazine  12.5-25 mg Q4HRS PRN   • prochlorperazine  5-10 mg Q4HRS PRN   • cloNIDine  0.1 mg Q4HRS PRN   • benzocaine-menthol  1 Lozenge Q2HRS PRN   • vitamin D  5,000 Units DAILY   • calcium carbonate  500 mg BID   • HYDROcodone/acetaminophen  1-2 Tab Q4HRS PRN   • HYDROmorphone  1 mg Q4HRS PRN       Assessment and Plan:  POD #3 L3-S1 laminectomy, onlay fusion   D/c drain #2  Ambulate, work with therapies  Continue incentive spirometry Q1H - advised pt in room   Continue pain control  Continue stool softeners to encourage BM, add suppository today  Discussed pain control - pt declined other msk relaxers other than Soma, steroids, gabapentin, cymbalta, other narcotics other than oxycodone.   Will add ice/non-pharmacologic modalities, pt states she feels better when ambulatory  Recommend monitoring O2 saturation while ambulatory  CXR reviewed. + Atelectasis  COnsulted hospitalist re: hypoxia. Will need VSS before d/c to home.   Prophylactic anticoagulation: no

## 2018-05-21 NOTE — CARE PLAN
Problem: Knowledge Deficit  Goal: Knowledge of disease process/condition, treatment plan, diagnostic tests, and medications will improve  poc discussed pt verbalizes understanding    Problem: Mobility  Goal: Risk for activity intolerance will decrease  Pt ambulating with fww and 1 person assist, calls appropriately for assistance

## 2018-05-22 PROBLEM — M54.9 POSTOPERATIVE BACK PAIN: Status: ACTIVE | Noted: 2018-05-22

## 2018-05-22 PROBLEM — G89.18 POSTOPERATIVE BACK PAIN: Status: ACTIVE | Noted: 2018-05-22

## 2018-05-22 PROBLEM — R11.0 NAUSEA: Status: ACTIVE | Noted: 2018-05-22

## 2018-05-22 PROCEDURE — 700105 HCHG RX REV CODE 258: Performed by: INTERNAL MEDICINE

## 2018-05-22 PROCEDURE — 700102 HCHG RX REV CODE 250 W/ 637 OVERRIDE(OP): Performed by: PHYSICIAN ASSISTANT

## 2018-05-22 PROCEDURE — 99232 SBSQ HOSP IP/OBS MODERATE 35: CPT | Performed by: INTERNAL MEDICINE

## 2018-05-22 PROCEDURE — 770001 HCHG ROOM/CARE - MED/SURG/GYN PRIV*

## 2018-05-22 PROCEDURE — 700102 HCHG RX REV CODE 250 W/ 637 OVERRIDE(OP): Performed by: NEUROLOGICAL SURGERY

## 2018-05-22 PROCEDURE — 700111 HCHG RX REV CODE 636 W/ 250 OVERRIDE (IP): Performed by: INTERNAL MEDICINE

## 2018-05-22 PROCEDURE — A9270 NON-COVERED ITEM OR SERVICE: HCPCS | Performed by: INTERNAL MEDICINE

## 2018-05-22 PROCEDURE — A9270 NON-COVERED ITEM OR SERVICE: HCPCS | Performed by: PHYSICIAN ASSISTANT

## 2018-05-22 PROCEDURE — 700111 HCHG RX REV CODE 636 W/ 250 OVERRIDE (IP): Performed by: PHYSICIAN ASSISTANT

## 2018-05-22 PROCEDURE — 700102 HCHG RX REV CODE 250 W/ 637 OVERRIDE(OP): Performed by: INTERNAL MEDICINE

## 2018-05-22 PROCEDURE — A9270 NON-COVERED ITEM OR SERVICE: HCPCS | Performed by: NEUROLOGICAL SURGERY

## 2018-05-22 PROCEDURE — 700112 HCHG RX REV CODE 229: Performed by: PHYSICIAN ASSISTANT

## 2018-05-22 RX ORDER — OXYCODONE HYDROCHLORIDE 10 MG/1
10 TABLET ORAL EVERY 4 HOURS PRN
Qty: 90 TAB | Refills: 0 | Status: SHIPPED | OUTPATIENT
Start: 2018-05-22 | End: 2018-06-05

## 2018-05-22 RX ORDER — BISACODYL 10 MG
10 SUPPOSITORY, RECTAL RECTAL ONCE
Status: COMPLETED | OUTPATIENT
Start: 2018-05-22 | End: 2018-05-22

## 2018-05-22 RX ORDER — POLYETHYLENE GLYCOL 3350 17 G/17G
17 POWDER, FOR SOLUTION ORAL 2 TIMES DAILY
Qty: 30 EACH | Refills: 1 | Status: SHIPPED | OUTPATIENT
Start: 2018-05-22 | End: 2018-05-29

## 2018-05-22 RX ORDER — CARISOPRODOL 350 MG/1
350 TABLET ORAL EVERY 6 HOURS PRN
Qty: 60 TAB | Refills: 0 | Status: SHIPPED | OUTPATIENT
Start: 2018-05-22 | End: 2018-06-05

## 2018-05-22 RX ADMIN — ENOXAPARIN SODIUM 30 MG: 100 INJECTION SUBCUTANEOUS at 22:02

## 2018-05-22 RX ADMIN — OXYCODONE HYDROCHLORIDE 10 MG: 10 TABLET ORAL at 11:37

## 2018-05-22 RX ADMIN — AMPICILLIN SODIUM AND SULBACTAM SODIUM 3 G: 2; 1 INJECTION, POWDER, FOR SOLUTION INTRAMUSCULAR; INTRAVENOUS at 09:18

## 2018-05-22 RX ADMIN — AMPICILLIN SODIUM AND SULBACTAM SODIUM 3 G: 2; 1 INJECTION, POWDER, FOR SOLUTION INTRAMUSCULAR; INTRAVENOUS at 22:03

## 2018-05-22 RX ADMIN — ANTACID TABLETS 500 MG: 500 TABLET, CHEWABLE ORAL at 08:20

## 2018-05-22 RX ADMIN — OXYCODONE HYDROCHLORIDE 10 MG: 10 TABLET ORAL at 16:10

## 2018-05-22 RX ADMIN — ENOXAPARIN SODIUM 30 MG: 100 INJECTION SUBCUTANEOUS at 09:18

## 2018-05-22 RX ADMIN — STANDARDIZED SENNA CONCENTRATE AND DOCUSATE SODIUM 1 TABLET: 8.6; 5 TABLET, FILM COATED ORAL at 08:20

## 2018-05-22 RX ADMIN — VITAMIN D, TAB 1000IU (100/BT) 5000 UNITS: 25 TAB at 08:21

## 2018-05-22 RX ADMIN — AMPICILLIN SODIUM AND SULBACTAM SODIUM 3 G: 2; 1 INJECTION, POWDER, FOR SOLUTION INTRAMUSCULAR; INTRAVENOUS at 15:16

## 2018-05-22 RX ADMIN — OXYCODONE HYDROCHLORIDE 10 MG: 10 TABLET ORAL at 22:02

## 2018-05-22 RX ADMIN — OXYCODONE HYDROCHLORIDE 10 MG: 10 TABLET ORAL at 02:38

## 2018-05-22 RX ADMIN — LEVOTHYROXINE SODIUM 150 MCG: 150 TABLET ORAL at 06:26

## 2018-05-22 RX ADMIN — BISACODYL 10 MG: 10 SUPPOSITORY RECTAL at 08:21

## 2018-05-22 RX ADMIN — POLYETHYLENE GLYCOL (3350) 1 PACKET: 17 POWDER, FOR SOLUTION ORAL at 08:20

## 2018-05-22 RX ADMIN — MAGNESIUM HYDROXIDE 60 ML: 400 SUSPENSION ORAL at 08:27

## 2018-05-22 RX ADMIN — AMPICILLIN SODIUM AND SULBACTAM SODIUM 3 G: 2; 1 INJECTION, POWDER, FOR SOLUTION INTRAMUSCULAR; INTRAVENOUS at 04:12

## 2018-05-22 RX ADMIN — ONDANSETRON 4 MG: 4 TABLET, ORALLY DISINTEGRATING ORAL at 09:22

## 2018-05-22 RX ADMIN — OXYCODONE HYDROCHLORIDE 10 MG: 10 TABLET ORAL at 06:26

## 2018-05-22 RX ADMIN — AZITHROMYCIN 250 MG: 250 TABLET, FILM COATED ORAL at 08:20

## 2018-05-22 RX ADMIN — ANTACID TABLETS 500 MG: 500 TABLET, CHEWABLE ORAL at 22:02

## 2018-05-22 RX ADMIN — CARISOPRODOL 350 MG: 350 TABLET ORAL at 18:23

## 2018-05-22 RX ADMIN — DOCUSATE SODIUM 100 MG: 100 CAPSULE ORAL at 08:20

## 2018-05-22 ASSESSMENT — PAIN SCALES - GENERAL
PAINLEVEL_OUTOF10: 9
PAINLEVEL_OUTOF10: 9
PAINLEVEL_OUTOF10: 6
PAINLEVEL_OUTOF10: 7
PAINLEVEL_OUTOF10: 6
PAINLEVEL_OUTOF10: 5
PAINLEVEL_OUTOF10: 4
PAINLEVEL_OUTOF10: 5
PAINLEVEL_OUTOF10: 5
PAINLEVEL_OUTOF10: 6
PAINLEVEL_OUTOF10: 6

## 2018-05-22 ASSESSMENT — ENCOUNTER SYMPTOMS
TREMORS: 0
WEAKNESS: 1
SENSORY CHANGE: 0
COUGH: 1
DIZZINESS: 0
SPEECH CHANGE: 0
SPUTUM PRODUCTION: 1
SINUS PAIN: 0
CONSTIPATION: 1
PALPITATIONS: 0
HEMOPTYSIS: 0
FEVER: 0
DIARRHEA: 0
CHILLS: 0
BACK PAIN: 1
VOMITING: 1
SHORTNESS OF BREATH: 0
INSOMNIA: 0
TINGLING: 0
FOCAL WEAKNESS: 0
WEIGHT LOSS: 0
DIAPHORESIS: 0
WHEEZING: 0
NERVOUS/ANXIOUS: 0
HEADACHES: 0
PND: 0
ORTHOPNEA: 0
DEPRESSION: 0
NAUSEA: 1
ABDOMINAL PAIN: 0

## 2018-05-22 NOTE — PROGRESS NOTES
[facility] Hospitalist Progress Note    Date of Service: 2018    Chief Complaint  62 y.o. female admitted 2018 for L3-S1 posterior lumbar laminectomy & fusion by Dr. Blackwell. Hospitalists consulted for post-op hypoxia.    Interval Problem Update  Down to 2 LPM via nasal cannula, O2 sats 95%. Denies SOB. No dyspnea on exam.  Patient feels week today (weaker than her baseline due to her MS).  Has had nausea since yesterday, some improvement with zofran.  Emesis yesterday but none since.  Feels constipated.  No BM since admit.  On bowel protocol.  Pain uncontrolled on oxycodone, has not been taking her prn soma.  VSS (pt states her BP has always been low in the 90s systolic).  Has been working with her I.S. & ambulating throughout the day.    Consultants/Specialty  Dr. Blackwell (attending)    Disposition  Anticipate home with home health in the next 1-2 days        Review of Systems   Constitutional: Positive for malaise/fatigue. Negative for chills, diaphoresis, fever and weight loss.   HENT: Negative for congestion, nosebleeds and sinus pain.    Respiratory: Positive for cough and sputum production. Negative for hemoptysis, shortness of breath and wheezing.    Cardiovascular: Negative for chest pain, palpitations, orthopnea, leg swelling and PND.   Gastrointestinal: Positive for constipation, nausea and vomiting. Negative for abdominal pain, diarrhea and melena.   Genitourinary: Negative for dysuria, frequency, hematuria and urgency.   Musculoskeletal: Positive for back pain.   Skin: Negative for itching and rash.   Neurological: Positive for weakness (generalized; worse from baseline). Negative for dizziness, tingling, tremors, sensory change, speech change, focal weakness and headaches.   Psychiatric/Behavioral: Negative for depression. The patient is not nervous/anxious and does not have insomnia.       Physical Exam  Laboratory/Imaging   Hemodynamics  Temp (24hrs), Av.4 °C (97.6 °F), Min:36.4 °C (97.5 °F),  Max:36.6 °C (97.9 °F)   Temperature: 36.4 °C (97.5 °F)  Pulse  Av.1  Min: 59  Max: 99    Blood Pressure: (!) 97/56      Respiratory  Respiration: 18, Pulse Oximetry: 95 %    Fluids    Intake/Output Summary (Last 24 hours) at 18 1415  Last data filed at 18 0900   Gross per 24 hour   Intake              300 ml   Output                0 ml   Net              300 ml     Nutrition  Orders Placed This Encounter   Procedures   • Diet Order     Standing Status:   Standing     Number of Occurrences:   1     Order Specific Question:   Diet:     Answer:   Regular [1]     Physical Exam   Constitutional: She is oriented to person, place, and time. She appears well-developed and well-nourished. No distress.   Obese   HENT:   Head: Normocephalic and atraumatic.   Right Ear: External ear normal.   Left Ear: External ear normal.   Nose: Nose normal.   Eyes: EOM are normal. Pupils are equal, round, and reactive to light. Right eye exhibits no discharge. Left eye exhibits no discharge. No scleral icterus.   Neck: Normal range of motion. Neck supple. No JVD present.   Cardiovascular: Normal rate, regular rhythm, normal heart sounds and intact distal pulses.  Exam reveals no gallop and no friction rub.    No murmur heard.  Pulmonary/Chest: Effort normal. No accessory muscle usage or stridor. No tachypnea. No respiratory distress. She has no decreased breath sounds. She has no wheezes. She has no rhonchi. She has rales in the left lower field.   Abdominal: Soft. She exhibits no distension. Bowel sounds are decreased. There is no tenderness. There is no rebound and no guarding.   Musculoskeletal: Normal range of motion. She exhibits no edema.   Neurological: She is alert and oriented to person, place, and time.   Skin: Skin is warm and dry. No rash noted. She is not diaphoretic. No erythema. No pallor.   Psychiatric: She has a normal mood and affect. Her behavior is normal. Judgment and thought content normal.   Nursing  note and vitals reviewed.    Recent Labs      05/21/18   1650   WBC  11.1*   RBC  4.12*   HEMOGLOBIN  12.4   HEMATOCRIT  39.9   MCV  96.8   MCH  30.1   MCHC  31.1*   RDW  49.0   PLATELETCT  279   MPV  10.9                          Assessment/Plan     Pneumonia   Assessment & Plan    On day 2 of IV unasyn & azithromycin.  Will continue.  Continue I.S. & ambulation.  Recheck CBC in the a.m.            Hypoxia   Assessment & Plan    Productive cough with abnormal cxr.  Afebrile but has mild leukocytosis.  Procalcitonin WNL.  Lactic acid normal.  Down to 2 LPM with O2 sats mid-90s.  Will continue to wean as tolerated.  Low suspicion of PE.  Will consider CT chest if hypoxia worsens or if she develops SOB/CP.   Continue to monitor O2 level.  Nursing to encourage ambulation.  Continue to work with I.S.        Nausea   Assessment & Plan    Unclear etiology.  Could be secondary to constipation.  No BM since admit.  Abdomen is soft, nondistended, & nontender.  Hypoactive bowel sounds. The patient has a history of constipation.  Is receiving bowel protocol.  Will plan for abd xray tomorrow if no results by then.        Postoperative back pain   Assessment & Plan    Uncontrolled on oxycodone.  Soma available prn (which she takes at home).          Neuroforaminal stenosis of lumbosacral spine- (present on admission)   Assessment & Plan    S/p posterior L3-S1 laminectomy & fusion by Dr. Blackwell on 5/18/18.  Dressing is clean, dry, & intact.  Dressing management per neurosurgery.  Pain control.        Multiple sclerosis (HCC)- (present on admission)   Assessment & Plan    Chronic, stable  Follow up outpatient          Quality-Core Measures   Reviewed items::  Labs reviewed, Medications reviewed and Radiology images reviewed  Seals catheter::  No Seals  DVT prophylaxis pharmacological::  Enoxaparin (Lovenox)  DVT prophylaxis - mechanical:  Not indicated at this time, ambulatory  Ulcer Prophylaxis::  Not indicated  Antibiotics:   Treating active infection/contamination beyond 24 hours perioperative coverage

## 2018-05-22 NOTE — PROGRESS NOTES
Neurosurgery Progress Note    Subjective:  Ambulatory, voiding  Passing gas, No BM   Unable to wean off O2 - medicine consulted yesterday  Started on unasyn, azithromycin   No leg pain, numbness, tingling, weakness   Pain well controlled on oral medications  Denies new pain, numbness, weakness.   Denies HA, positional HA, N/V, dizziness, chest pain, SOB, difficulty breathing, chills    Exam:  VSS with O2 3L via NC, Desaturates to 88% off supplemental O2   A&Ox4, NAD  NM: 5/5 hip flexion, knee extension, knee flexion, plantar flexion, dorsiflexion, EHL   Sensation intact and equal throughout all four extremities.   Abdomen: soft, non-tender  Non-labored breathing on room air, normal respiratory effort.   No cyanosis in UEs/perioral   Capillary refill in all four extremities <2 seconds  No LE edema, erythema, cyanosis, clubbing  Calves non-tender to compression bilat  Incision CDI, no halo sign  Drain: intact, minimal output.       BP  Min: 91/69  Max: 113/53  Pulse  Av.3  Min: 78  Max: 91  Resp  Av  Min: 18  Max: 18  Temp  Av.5 °C (97.7 °F)  Min: 36.4 °C (97.5 °F)  Max: 36.6 °C (97.9 °F)  SpO2  Av.7 %  Min: 93 %  Max: 94 %    No Data Recorded    Recent Labs      18   1650   WBC  11.1*   RBC  4.12*   HEMOGLOBIN  12.4   HEMATOCRIT  39.9   MCV  96.8   MCH  30.1   MCHC  31.1*   RDW  49.0   PLATELETCT  279   MPV  10.9                   Intake/Output       18 07 - 18 0659 18 07 - 18 0659       Total  Total       Intake    Total Intake -- -- -- -- -- --       Output    Drains  20  -- 20  --  -- --    Output (ml) ([REMOVED] Surgical Drain Group Back Hemovac 2 (B)) 20 -- 20 -- -- --    Stool  --  -- --  --  -- --    Number of Times Stooled 0 x -- 0 x -- -- --    Total Output 20 -- 20 -- -- --       Net I/O     -20 -- -20 -- -- --            Intake/Output Summary (Last 24 hours) at 18 0259  Last data filed at 18 4404   Gross  per 24 hour   Intake                0 ml   Output               20 ml   Net              -20 ml            • bisacodyl  10 mg Once   • ampicillin-sulbactam (UNASYN) IV  3 g Q6HRS   • azithromycin  250 mg DAILY   • carisoprodol  350 mg Q6HRS PRN   • oxyCODONE immediate-release  10 mg Q4HRS PRN   • LR   Continuous   • levothyroxine  150 mcg AM ES   • Pharmacy Consult Request  1 Each PRN   • MD ALERT...Do not administer NSAIDS or ASPIRIN unless ORDERED By Neurosurgery  1 Each PRN   • docusate sodium  100 mg BID   • senna-docusate  1 Tab Nightly   • senna-docusate  1 Tab Q24HRS PRN   • polyethylene glycol/lytes  1 Packet BID PRN   • magnesium hydroxide  30 mL QDAY PRN   • fleet  1 Each Once PRN   • NS   Continuous   • acetaminophen  500 mg Q6HRS PRN   • diphenhydrAMINE  25 mg Q6HRS PRN    Or   • diphenhydrAMINE  25 mg Q6HRS PRN   • ondansetron  4 mg Q4HRS PRN   • ondansetron  4 mg Q4HRS PRN   • promethazine  12.5-25 mg Q4HRS PRN   • promethazine  12.5-25 mg Q4HRS PRN   • prochlorperazine  5-10 mg Q4HRS PRN   • cloNIDine  0.1 mg Q4HRS PRN   • benzocaine-menthol  1 Lozenge Q2HRS PRN   • vitamin D  5,000 Units DAILY   • calcium carbonate  500 mg BID   • HYDROcodone/acetaminophen  1-2 Tab Q4HRS PRN   • HYDROmorphone  1 mg Q4HRS PRN       Assessment and Plan:  POD #4 L3-S1 laminectomy, onlay fusion   Ambulate, work with therapies  Continue incentive spirometry Q1H - advised pt in room   Continue pain control  Continue stool softeners to encourage BM, add suppository today  Discussed pain control - pt declined other msk relaxers other than Soma, steroids, gabapentin, cymbalta, other narcotics other than oxycodone.   Will add ice/non-pharmacologic modalities, pt states she feels better when ambulatory  Recommend monitoring O2 saturation while ambulatory  OK to d/c to home with HH from neurosurgery standpoint. Will put all d/c meds and orders in.   Would appreciate hospitalist recommendations and precriptions for abx upon d/c  to home if medically cleared  Thank you to all teams involved in the care of this patient  Needs dressing changed prior to d/c    DC instructions d/w pt  Prophylactic anticoagulation: lovenox    INCISION CARE:  OK to shower with incision covered or uncovered. After shower, pat incision dry and cover with gauze and tape if draining. OK to leave open to air if not draining.   Steri-strips, if applicable can be removed as they fall off on their own naturally.     RESTRICTIONS:  Continue to wear your brace as directed   No lifting greater than 10 pounds, no repetitive bending or twisting  No driving for two weeks, no driving while on narcotic medication or muscle relaxers  No aspirin, blood thinners, NSAIDS (non-steroidal anti-inflammatory medications - aleve, motrin, ibuprofen, celebrex) for two weeks     RECOMMENDATIONS:  Over the counter stool softeners daily while on narcotics  Ambulate often to prevent blood clots in your legs  Continue incentive spirometer hourly   Follow up at [facility] in 2 weeks after surgery.    Please call [phone] to confirm the date, location, and time of your follow up appointment that was made for you.

## 2018-05-22 NOTE — CARE PLAN
Problem: Communication  Goal: The ability to communicate needs accurately and effectively will improve  Outcome: PROGRESSING AS EXPECTED  Patient able to communicate needs effectively and uses call light appropriately. RN hourly rounding in place.     Problem: Safety  Goal: Will remain free from injury  Outcome: PROGRESSING AS EXPECTED  Patient remains free from injury. Patient uses call light when necessary. Patient educated on injury prevention, up self in room.

## 2018-05-22 NOTE — ASSESSMENT & PLAN NOTE
Unclear etiology.  Minimal relief with zofran. Phenergan available, which she states has been effective in the past.

## 2018-05-22 NOTE — ASSESSMENT & PLAN NOTE
Feels she can better control the pain at home.  Discharge pain meds already ordered by neurosurgery.

## 2018-05-22 NOTE — PROGRESS NOTES
Hospitalist told RN that pt will not be discharged today. ECHO ordered for Dyspnea. Prescriptions were placed in pt chart.

## 2018-05-22 NOTE — PROGRESS NOTES
Patient resting quietly in bed, no S/S of distress, A&Ox4. Denies SOB, chest pain, dizziness. Call light within reach, pt calls appropriately and ambulates to restroom on her own. Bed in lowest position, bed locked, RN and CNA numbers provided, no further needs at this time. No changes from EPIC. Hourly rounding in place.

## 2018-05-22 NOTE — CARE PLAN
Problem: Safety  Goal: Will remain free from injury  Outcome: PROGRESSING AS EXPECTED  Call bell within reach, hourly rounding, non-skid socks on. Pt calls appropriately for assistance     Problem: Pain Management  Goal: Pain level will decrease to patient's comfort goal  Outcome: PROGRESSING AS EXPECTED  PRN oxy given with good outcome. Pt also able to reposition self.

## 2018-05-23 LAB
BASOPHILS # BLD AUTO: 0.3 % (ref 0–1.8)
BASOPHILS # BLD: 0.03 K/UL (ref 0–0.12)
EOSINOPHIL # BLD AUTO: 0.41 K/UL (ref 0–0.51)
EOSINOPHIL NFR BLD: 4.2 % (ref 0–6.9)
ERYTHROCYTE [DISTWIDTH] IN BLOOD BY AUTOMATED COUNT: 49 FL (ref 35.9–50)
HCT VFR BLD AUTO: 40.5 % (ref 37–47)
HGB BLD-MCNC: 12.6 G/DL (ref 12–16)
IMM GRANULOCYTES # BLD AUTO: 0.03 K/UL (ref 0–0.11)
IMM GRANULOCYTES NFR BLD AUTO: 0.3 % (ref 0–0.9)
LV EJECT FRACT  99904: 65
LV EJECT FRACT MOD 2C 99903: 81.98
LV EJECT FRACT MOD 4C 99902: 70.99
LV EJECT FRACT MOD BP 99901: 77.38
LYMPHOCYTES # BLD AUTO: 2.33 K/UL (ref 1–4.8)
LYMPHOCYTES NFR BLD: 24.1 % (ref 22–41)
MCH RBC QN AUTO: 30.5 PG (ref 27–33)
MCHC RBC AUTO-ENTMCNC: 31.1 G/DL (ref 33.6–35)
MCV RBC AUTO: 98.1 FL (ref 81.4–97.8)
MONOCYTES # BLD AUTO: 0.58 K/UL (ref 0–0.85)
MONOCYTES NFR BLD AUTO: 6 % (ref 0–13.4)
NEUTROPHILS # BLD AUTO: 6.3 K/UL (ref 2–7.15)
NEUTROPHILS NFR BLD: 65.1 % (ref 44–72)
NRBC # BLD AUTO: 0 K/UL
NRBC BLD-RTO: 0 /100 WBC
PLATELET # BLD AUTO: 279 K/UL (ref 164–446)
PMV BLD AUTO: 10.9 FL (ref 9–12.9)
RBC # BLD AUTO: 4.13 M/UL (ref 4.2–5.4)
WBC # BLD AUTO: 9.7 K/UL (ref 4.8–10.8)

## 2018-05-23 PROCEDURE — 700102 HCHG RX REV CODE 250 W/ 637 OVERRIDE(OP): Performed by: PHYSICIAN ASSISTANT

## 2018-05-23 PROCEDURE — 700111 HCHG RX REV CODE 636 W/ 250 OVERRIDE (IP): Performed by: INTERNAL MEDICINE

## 2018-05-23 PROCEDURE — 85025 COMPLETE CBC W/AUTO DIFF WBC: CPT

## 2018-05-23 PROCEDURE — A9270 NON-COVERED ITEM OR SERVICE: HCPCS | Performed by: INTERNAL MEDICINE

## 2018-05-23 PROCEDURE — A9270 NON-COVERED ITEM OR SERVICE: HCPCS | Performed by: NEUROLOGICAL SURGERY

## 2018-05-23 PROCEDURE — 700111 HCHG RX REV CODE 636 W/ 250 OVERRIDE (IP): Performed by: PHYSICIAN ASSISTANT

## 2018-05-23 PROCEDURE — 700105 HCHG RX REV CODE 258: Performed by: INTERNAL MEDICINE

## 2018-05-23 PROCEDURE — 770001 HCHG ROOM/CARE - MED/SURG/GYN PRIV*

## 2018-05-23 PROCEDURE — 93306 TTE W/DOPPLER COMPLETE: CPT

## 2018-05-23 PROCEDURE — 99232 SBSQ HOSP IP/OBS MODERATE 35: CPT | Performed by: INTERNAL MEDICINE

## 2018-05-23 PROCEDURE — 36415 COLL VENOUS BLD VENIPUNCTURE: CPT

## 2018-05-23 PROCEDURE — 700102 HCHG RX REV CODE 250 W/ 637 OVERRIDE(OP): Performed by: INTERNAL MEDICINE

## 2018-05-23 PROCEDURE — 700102 HCHG RX REV CODE 250 W/ 637 OVERRIDE(OP): Performed by: NEUROLOGICAL SURGERY

## 2018-05-23 PROCEDURE — 93306 TTE W/DOPPLER COMPLETE: CPT | Mod: 26 | Performed by: INTERNAL MEDICINE

## 2018-05-23 PROCEDURE — A9270 NON-COVERED ITEM OR SERVICE: HCPCS | Performed by: PHYSICIAN ASSISTANT

## 2018-05-23 RX ADMIN — ENOXAPARIN SODIUM 30 MG: 100 INJECTION SUBCUTANEOUS at 22:01

## 2018-05-23 RX ADMIN — OXYCODONE HYDROCHLORIDE 10 MG: 10 TABLET ORAL at 06:32

## 2018-05-23 RX ADMIN — AMPICILLIN SODIUM AND SULBACTAM SODIUM 3 G: 2; 1 INJECTION, POWDER, FOR SOLUTION INTRAMUSCULAR; INTRAVENOUS at 04:36

## 2018-05-23 RX ADMIN — AZITHROMYCIN 250 MG: 250 TABLET, FILM COATED ORAL at 08:55

## 2018-05-23 RX ADMIN — OXYCODONE HYDROCHLORIDE 10 MG: 10 TABLET ORAL at 22:01

## 2018-05-23 RX ADMIN — ANTACID TABLETS 500 MG: 500 TABLET, CHEWABLE ORAL at 08:47

## 2018-05-23 RX ADMIN — ENOXAPARIN SODIUM 30 MG: 100 INJECTION SUBCUTANEOUS at 08:46

## 2018-05-23 RX ADMIN — OXYCODONE HYDROCHLORIDE 10 MG: 10 TABLET ORAL at 10:27

## 2018-05-23 RX ADMIN — OXYCODONE HYDROCHLORIDE 10 MG: 10 TABLET ORAL at 14:26

## 2018-05-23 RX ADMIN — OXYCODONE HYDROCHLORIDE 10 MG: 10 TABLET ORAL at 02:22

## 2018-05-23 RX ADMIN — ANTACID TABLETS 500 MG: 500 TABLET, CHEWABLE ORAL at 22:01

## 2018-05-23 RX ADMIN — LEVOTHYROXINE SODIUM 150 MCG: 150 TABLET ORAL at 06:32

## 2018-05-23 RX ADMIN — VITAMIN D, TAB 1000IU (100/BT) 5000 UNITS: 25 TAB at 08:47

## 2018-05-23 RX ADMIN — AMPICILLIN SODIUM AND SULBACTAM SODIUM 3 G: 2; 1 INJECTION, POWDER, FOR SOLUTION INTRAMUSCULAR; INTRAVENOUS at 16:44

## 2018-05-23 ASSESSMENT — ENCOUNTER SYMPTOMS
WEIGHT LOSS: 0
PALPITATIONS: 0
ABDOMINAL PAIN: 0
COUGH: 1
FEVER: 0
HEADACHES: 0
WHEEZING: 0
SHORTNESS OF BREATH: 0
DIAPHORESIS: 0
VOMITING: 1
DIZZINESS: 0
SPEECH CHANGE: 0
SINUS PAIN: 0
NERVOUS/ANXIOUS: 0
FOCAL WEAKNESS: 0
HEMOPTYSIS: 0
SENSORY CHANGE: 0
BACK PAIN: 1
CHILLS: 0
TREMORS: 0
ORTHOPNEA: 0
DEPRESSION: 0
CONSTIPATION: 0
INSOMNIA: 0
TINGLING: 0
WEAKNESS: 0
SPUTUM PRODUCTION: 1
DIARRHEA: 1
PND: 0
NAUSEA: 1

## 2018-05-23 ASSESSMENT — PAIN SCALES - GENERAL
PAINLEVEL_OUTOF10: 8
PAINLEVEL_OUTOF10: 5
PAINLEVEL_OUTOF10: 8
PAINLEVEL_OUTOF10: 9
PAINLEVEL_OUTOF10: 5
PAINLEVEL_OUTOF10: 5
PAINLEVEL_OUTOF10: 7

## 2018-05-23 NOTE — PROGRESS NOTES
OOB and ambulated in hallway w/ brace on. Sarabjit well. Pt indep w/ adl's.. Sarabjit po's, voiding w/out problems. Encr pulm hygiene/IS. Pt desats on RA 84%. O2 2.5 L per nc sats >92%. Sarabjit po's, voiding w/out problems. Incision well approximated. Po pain meds providing adeq pain relief.

## 2018-05-23 NOTE — PROGRESS NOTES
Neurosurgery Progress Note    Subjective:  Patient denies complaints    Exam:  nonfocal exam.  Dressing is cdi.  Patient is on 4 l o2 per nc.    BP  Min: 97/56  Max: 106/55  Pulse  Av.5  Min: 78  Max: 88  Resp  Av.5  Min: 16  Max: 18  Temp  Av.3 °C (97.4 °F)  Min: 36.2 °C (97.1 °F)  Max: 36.6 °C (97.8 °F)  SpO2  Av %  Min: 95 %  Max: 97 %    No Data Recorded    Recent Labs      18   1650  18   0345   WBC  11.1*  9.7   RBC  4.12*  4.13*   HEMOGLOBIN  12.4  12.6   HEMATOCRIT  39.9  40.5   MCV  96.8  98.1*   MCH  30.1  30.5   MCHC  31.1*  31.1*   RDW  49.0  49.0   PLATELETCT  279  279   MPV  10.9  10.9                   Intake/Output       18 0700 - 18 0659 18 0700 - 18 0659      [id number] Total [id number] [id number] Total       Intake    P.O.  300  -- 300  --  -- --    P.O. 300 -- 300 -- -- --    Total Intake 300 -- 300 -- -- --       Output    Stool  --  -- --  --  -- --    Number of Times Stooled 4 x -- 4 x -- -- --    Total Output -- -- -- -- -- --       Net I/O     300 -- 300 -- -- --            Intake/Output Summary (Last 24 hours) at 18 0728  Last data filed at 18 0900   Gross per 24 hour   Intake              300 ml   Output                0 ml   Net              300 ml            • enoxaparin (LOVENOX) injection  30 mg Q12HRS   • magnesium hydroxide  60 mL DAILY   • ampicillin-sulbactam (UNASYN) IV  3 g Q6HRS   • azithromycin  250 mg DAILY   • carisoprodol  350 mg Q6HRS PRN   • oxyCODONE immediate-release  10 mg Q4HRS PRN   • LR   Continuous   • levothyroxine  150 mcg AM ES   • Pharmacy Consult Request  1 Each PRN   • MD ALERT...Do not administer NSAIDS or ASPIRIN unless ORDERED By Neurosurgery  1 Each PRN   • docusate sodium  100 mg BID   • senna-docusate  1 Tab Nightly   • senna-docusate  1 Tab Q24HRS PRN   • polyethylene glycol/lytes  1 Packet BID PRN   • fleet  1 Each Once PRN   • acetaminophen  500 mg Q6HRS PRN   • diphenhydrAMINE   25 mg Q6HRS PRN    Or   • diphenhydrAMINE  25 mg Q6HRS PRN   • ondansetron  4 mg Q4HRS PRN   • ondansetron  4 mg Q4HRS PRN   • promethazine  12.5-25 mg Q4HRS PRN   • promethazine  12.5-25 mg Q4HRS PRN   • prochlorperazine  5-10 mg Q4HRS PRN   • cloNIDine  0.1 mg Q4HRS PRN   • benzocaine-menthol  1 Lozenge Q2HRS PRN   • vitamin D  5,000 Units DAILY   • calcium carbonate  500 mg BID   • HYDROcodone/acetaminophen  1-2 Tab Q4HRS PRN   • HYDROmorphone  1 mg Q4HRS PRN       Assessment and Plan:  Hospital day #6  POD #5  Prophylactic anticoagulation: yes         Start date/time: postop    Patient looks good.  OK to be discharged home with hh when cleared by medicine.    Appreciate nursing/medicine help.  Continue therapies.

## 2018-05-23 NOTE — CARE PLAN
Problem: Communication  Goal: The ability to communicate needs accurately and effectively will improve  Outcome: PROGRESSING AS EXPECTED  Patient able to communicate needs effectively and uses call light appropriately. RN hourly rounding in place.     Problem: Safety  Goal: Will remain free from falls  Outcome: PROGRESSING AS EXPECTED  Fall precautions implemented. Treaded socks and bed alarm in place. Patient remains free from falls.

## 2018-05-23 NOTE — PROGRESS NOTES
Patient resting quietly in bed, no S/S of distress, A&Ox4. Denies SOB, chest pain, dizziness. Patient refusing bed alarm, ambulates to the restroom independently. Bed in lowest position, bed locked, RN and CNA numbers provided, no further needs at this time. No changes from EPIC. Hourly rounding in place.

## 2018-05-24 ENCOUNTER — PATIENT OUTREACH (OUTPATIENT)
Dept: HEALTH INFORMATION MANAGEMENT | Facility: OTHER | Age: 63
End: 2018-05-24

## 2018-05-24 ENCOUNTER — APPOINTMENT (OUTPATIENT)
Dept: RADIOLOGY | Facility: MEDICAL CENTER | Age: 63
DRG: 459 | End: 2018-05-24
Attending: NURSE PRACTITIONER
Payer: MEDICARE

## 2018-05-24 VITALS
BODY MASS INDEX: 46.91 KG/M2 | TEMPERATURE: 97.7 F | OXYGEN SATURATION: 95 % | WEIGHT: 264.77 LBS | DIASTOLIC BLOOD PRESSURE: 55 MMHG | RESPIRATION RATE: 18 BRPM | SYSTOLIC BLOOD PRESSURE: 107 MMHG | HEART RATE: 73 BPM | HEIGHT: 63 IN

## 2018-05-24 PROBLEM — R11.0 NAUSEA: Status: RESOLVED | Noted: 2018-05-22 | Resolved: 2018-05-24

## 2018-05-24 PROCEDURE — 700111 HCHG RX REV CODE 636 W/ 250 OVERRIDE (IP): Performed by: PHYSICIAN ASSISTANT

## 2018-05-24 PROCEDURE — 700102 HCHG RX REV CODE 250 W/ 637 OVERRIDE(OP): Performed by: INTERNAL MEDICINE

## 2018-05-24 PROCEDURE — A9270 NON-COVERED ITEM OR SERVICE: HCPCS | Performed by: NEUROLOGICAL SURGERY

## 2018-05-24 PROCEDURE — 700102 HCHG RX REV CODE 250 W/ 637 OVERRIDE(OP): Performed by: NEUROLOGICAL SURGERY

## 2018-05-24 PROCEDURE — 99232 SBSQ HOSP IP/OBS MODERATE 35: CPT | Performed by: INTERNAL MEDICINE

## 2018-05-24 PROCEDURE — 700105 HCHG RX REV CODE 258: Performed by: NEUROLOGICAL SURGERY

## 2018-05-24 PROCEDURE — A9270 NON-COVERED ITEM OR SERVICE: HCPCS | Performed by: PHYSICIAN ASSISTANT

## 2018-05-24 PROCEDURE — 700102 HCHG RX REV CODE 250 W/ 637 OVERRIDE(OP): Performed by: PHYSICIAN ASSISTANT

## 2018-05-24 PROCEDURE — A9270 NON-COVERED ITEM OR SERVICE: HCPCS | Performed by: INTERNAL MEDICINE

## 2018-05-24 PROCEDURE — 700105 HCHG RX REV CODE 258: Performed by: INTERNAL MEDICINE

## 2018-05-24 PROCEDURE — 700111 HCHG RX REV CODE 636 W/ 250 OVERRIDE (IP): Performed by: INTERNAL MEDICINE

## 2018-05-24 RX ORDER — AMOXICILLIN AND CLAVULANATE POTASSIUM 875; 125 MG/1; MG/1
1 TABLET, FILM COATED ORAL 2 TIMES DAILY
Qty: 14 TAB | Refills: 0 | Status: SHIPPED | OUTPATIENT
Start: 2018-05-24 | End: 2018-05-31

## 2018-05-24 RX ADMIN — OXYCODONE HYDROCHLORIDE 10 MG: 10 TABLET ORAL at 16:22

## 2018-05-24 RX ADMIN — OXYCODONE HYDROCHLORIDE 10 MG: 10 TABLET ORAL at 10:58

## 2018-05-24 RX ADMIN — OXYCODONE HYDROCHLORIDE 10 MG: 10 TABLET ORAL at 06:20

## 2018-05-24 RX ADMIN — ANTACID TABLETS 500 MG: 500 TABLET, CHEWABLE ORAL at 09:59

## 2018-05-24 RX ADMIN — AMPICILLIN SODIUM AND SULBACTAM SODIUM 3 G: 2; 1 INJECTION, POWDER, FOR SOLUTION INTRAMUSCULAR; INTRAVENOUS at 04:24

## 2018-05-24 RX ADMIN — VITAMIN D, TAB 1000IU (100/BT) 5000 UNITS: 25 TAB at 09:59

## 2018-05-24 RX ADMIN — SODIUM CHLORIDE, POTASSIUM CHLORIDE, SODIUM LACTATE AND CALCIUM CHLORIDE: 600; 310; 30; 20 INJECTION, SOLUTION INTRAVENOUS at 04:24

## 2018-05-24 RX ADMIN — LEVOTHYROXINE SODIUM 150 MCG: 150 TABLET ORAL at 06:20

## 2018-05-24 RX ADMIN — ENOXAPARIN SODIUM 30 MG: 100 INJECTION SUBCUTANEOUS at 09:58

## 2018-05-24 RX ADMIN — AMPICILLIN SODIUM AND SULBACTAM SODIUM 3 G: 2; 1 INJECTION, POWDER, FOR SOLUTION INTRAMUSCULAR; INTRAVENOUS at 09:59

## 2018-05-24 RX ADMIN — OXYCODONE HYDROCHLORIDE 10 MG: 10 TABLET ORAL at 02:27

## 2018-05-24 RX ADMIN — AZITHROMYCIN 250 MG: 250 TABLET, FILM COATED ORAL at 10:00

## 2018-05-24 ASSESSMENT — ENCOUNTER SYMPTOMS
SENSORY CHANGE: 0
DIAPHORESIS: 0
CONSTIPATION: 0
VOMITING: 0
BLOOD IN STOOL: 0
DIZZINESS: 0
COUGH: 1
FOCAL WEAKNESS: 0
SINUS PAIN: 0
TREMORS: 0
DEPRESSION: 0
CHILLS: 0
WEIGHT LOSS: 0
ORTHOPNEA: 0
TINGLING: 0
SHORTNESS OF BREATH: 0
PALPITATIONS: 0
ABDOMINAL PAIN: 0
NAUSEA: 0
HEADACHES: 0
WEAKNESS: 1
FEVER: 0
HEMOPTYSIS: 0
BACK PAIN: 1
WHEEZING: 0
PND: 0
NERVOUS/ANXIOUS: 0
INSOMNIA: 0
SPEECH CHANGE: 0
DIARRHEA: 0
SPUTUM PRODUCTION: 1

## 2018-05-24 ASSESSMENT — PAIN SCALES - GENERAL
PAINLEVEL_OUTOF10: 8
PAINLEVEL_OUTOF10: 5
PAINLEVEL_OUTOF10: 8
PAINLEVEL_OUTOF10: 9
PAINLEVEL_OUTOF10: 8
PAINLEVEL_OUTOF10: 7
PAINLEVEL_OUTOF10: 7
PAINLEVEL_OUTOF10: 5

## 2018-05-24 NOTE — PROGRESS NOTES
Patient resting quietly in bed, no S/S of distress, A&Ox4. Denies SOB, chest pain, dizziness. Patient refusing bed alarm and ambulates to the restroom by herself. Bed in lowest position, bed locked, RN and CNA numbers provided, no further needs at this time. No changes from EPIC. Hourly rounding in place.

## 2018-05-24 NOTE — PROGRESS NOTES
Discharge instructions reviewed with pt including prescriptions and follow up appointments. IV removed, tip intact.

## 2018-05-24 NOTE — PROGRESS NOTES
1300 - STEPH Sanders contacted to notify her that pt still needs appropriate IV inserted for her procedure. Per STEPH Sanders, call her to give update in an hour.

## 2018-05-24 NOTE — PROGRESS NOTES
[facility] Hospitalist Progress Note    Date of Service: 5/24/2018    Chief Complaint  62 y.o. female admitted 5/18/2018 for L3-S1 posterior lumbar laminectomy & fusion by Dr. Blackwell. Hospitalists consulted for post-op hypoxia.    Interval Problem Update  Hypoxia improving (see flow sheet for ambulating O2 sats). She has O2 available at home & will ask someone to bring her a tank anticipating discharge today.  Feeling much better today. More energetic. No SOB/dyspnea.  No N/V.  Diarrhea resolved.  Had a normal BM this morning.  Pain better controlled on soma & oxycodone.  VSS (pt states her BP has always been low in the 90s systolic). Afebrile.  Continues to work with her I.S. & ambulates frequently.  Wants to go home.    Ok to discharge home today on PO augmentin. That medication has been e-prescribed to the patient's pharmacy.  Follow up with PCP in 1-2 weeks. Placed updated discharge order after speaking with neurosurgery MANN Maldonado. DC summary to be done by the attending service ([facility]).    Consultants/Specialty  Dr. Blackwell (attending)    Disposition  Anticipate discharge home today. [facility] has already accepted the patient.      Review of Systems   Constitutional: Negative for chills, diaphoresis, fever, malaise/fatigue and weight loss.   HENT: Negative for congestion, nosebleeds and sinus pain.    Respiratory: Positive for cough and sputum production (thick brown). Negative for hemoptysis, shortness of breath and wheezing.    Cardiovascular: Negative for chest pain, palpitations, orthopnea, leg swelling and PND.   Gastrointestinal: Negative for abdominal pain, blood in stool, constipation, diarrhea (reports normal BM this morning), melena, nausea and vomiting.   Genitourinary: Negative for dysuria, frequency, hematuria and urgency.   Musculoskeletal: Positive for back pain.   Skin: Negative for itching and rash.   Neurological: Positive for weakness (generalized, chronic). Negative for  dizziness, tingling, tremors, sensory change, speech change, focal weakness and headaches.   Psychiatric/Behavioral: Negative for depression. The patient is not nervous/anxious and does not have insomnia.       Physical Exam  Laboratory/Imaging   Hemodynamics  Temp (24hrs), Av.4 °C (97.5 °F), Min:36.1 °C (96.9 °F), Max:36.6 °C (97.9 °F)   Temperature: 36.5 °C (97.7 °F)  Pulse  Av.8  Min: 59  Max: 99    Blood Pressure: 107/55      Respiratory  Respiration: 18, Pulse Oximetry: 95 %    Fluids  No intake or output data in the 24 hours ending 18 1601     Nutrition  Orders Placed This Encounter   Procedures   • Diet Order     Standing Status:   Standing     Number of Occurrences:   1     Order Specific Question:   Diet:     Answer:   Regular [1]     Physical Exam   Constitutional: She is oriented to person, place, and time. She appears well-developed and well-nourished. No distress.   Obese   HENT:   Head: Normocephalic and atraumatic.   Eyes: EOM are normal. Pupils are equal, round, and reactive to light. Right eye exhibits no discharge. Left eye exhibits no discharge. No scleral icterus.   Neck: Normal range of motion. Neck supple. No JVD present.   Cardiovascular: Normal rate, regular rhythm, normal heart sounds and intact distal pulses.  Exam reveals no gallop and no friction rub.    No murmur heard.  Pulmonary/Chest: Effort normal. No accessory muscle usage or stridor. No tachypnea. No respiratory distress. She has no decreased breath sounds. She has no wheezes. She has no rhonchi. She has no rales.   Abdominal: Soft. Bowel sounds are normal. She exhibits no distension. There is no tenderness. There is no rebound and no guarding.   Musculoskeletal: Normal range of motion. She exhibits no edema.   Neurological: She is alert and oriented to person, place, and time.   Skin: Skin is warm and dry. No rash noted. She is not diaphoretic. No erythema. No pallor.   Posterior lumbar surgical incision is open to  air at the time of exam.  Is well-approximated, clean, dry, & intact.  No erythema or edema noted.  Sutures present.   Psychiatric: She has a normal mood and affect. Her behavior is normal. Judgment and thought content normal.   Nursing note and vitals reviewed.    Recent Labs      05/21/18   1650  05/23/18   0345   WBC  11.1*  9.7   RBC  4.12*  4.13*   HEMOGLOBIN  12.4  12.6   HEMATOCRIT  39.9  40.5   MCV  96.8  98.1*   MCH  30.1  30.5   MCHC  31.1*  31.1*   RDW  49.0  49.0   PLATELETCT  279  279   MPV  10.9  10.9                          Assessment/Plan     Pneumonia   Assessment & Plan    Today is day 4 of IV unasyn & azithromycin.    Discharging on 7 day course of augmentin.            Hypoxia   Assessment & Plan    Starting to resolve.  Low suspicion of PE. Showing clinical improvement on antibiotics.  Discharge home with O2.  Continue frequent ambulation.  Continue to work with I.S.          Postoperative back pain   Assessment & Plan    Feels she can better control the pain at home.  Discharge pain meds already ordered by neurosurgery.        Neuroforaminal stenosis of lumbosacral spine- (present on admission)   Assessment & Plan    S/p posterior L3-S1 laminectomy & fusion by Dr. Blackwell on 5/18/18.  Site is uncomplicated.    Dressing management per neurosurgery.  Follow up with neurosurgery as directed by them.        Multiple sclerosis (HCC)- (present on admission)   Assessment & Plan    Chronic, stable  Follow up outpatient          Quality-Core Measures   Reviewed items::  Labs reviewed, Medications reviewed and Radiology images reviewed  Seals catheter::  No Seals  DVT prophylaxis pharmacological::  Enoxaparin (Lovenox)  DVT prophylaxis - mechanical:  Not indicated at this time, ambulatory  Ulcer Prophylaxis::  Not indicated  Antibiotics:  Treating active infection/contamination beyond 24 hours perioperative coverage  Assessed for rehabilitation services:  Patient was assess for and/or received  rehabilitation services during this hospitalization

## 2018-05-24 NOTE — PROGRESS NOTES
Pt A&Ox4, numbness and tingling from chest down (due to MS), pain is 8/10 (pt stated she can wait until next oxycodone is available).    Pt refused bed alarm despite education, call light within reach, pt educated on importance of using the call light when she needs assistance, pt verbalized understanding.

## 2018-05-24 NOTE — PROGRESS NOTES
[facility] Hospitalist Progress Note    Date of Service: 5/24/2018    Chief Complaint  62 y.o. female admitted 5/18/2018 for L3-S1 posterior lumbar laminectomy & fusion by Dr. Blackwell. Hospitalists consulted for post-op hypoxia.    Interval Problem Update  Hypoxia improving (see flow sheet for ambulating O2 sats). She has O2 available at home & will ask someone to bring her a tank anticipating discharge today.  Feeling much better today. More energetic. No SOB/dyspnea.  No N/V.  Diarrhea resolved.  Had a normal BM this morning.  Pain better controlled on soma & oxycodone.  VSS (pt states her BP has always been low in the 90s systolic). Afebrile.  Continues to work with her I.S. & ambulates frequently.  Wants to go home.    Ok to discharge home today on PO augmentin. That medication has been e-prescribed to the patient's pharmacy.  Follow up with PCP in 1-2 weeks.    Consultants/Specialty  Dr. Blackwell (attending)    Disposition  Anticipate discharge home today. [facility] has already accepted the patient.      Review of Systems   Constitutional: Negative for chills, diaphoresis, fever, malaise/fatigue and weight loss.   HENT: Negative for congestion, nosebleeds and sinus pain.    Respiratory: Positive for cough and sputum production (thick brown). Negative for hemoptysis, shortness of breath and wheezing.    Cardiovascular: Negative for chest pain, palpitations, orthopnea, leg swelling and PND.   Gastrointestinal: Negative for abdominal pain, blood in stool, constipation, diarrhea (reports normal BM this morning), melena, nausea and vomiting.   Genitourinary: Negative for dysuria, frequency, hematuria and urgency.   Musculoskeletal: Positive for back pain.   Skin: Negative for itching and rash.   Neurological: Positive for weakness (generalized, chronic). Negative for dizziness, tingling, tremors, sensory change, speech change, focal weakness and headaches.   Psychiatric/Behavioral: Negative for depression. The  patient is not nervous/anxious and does not have insomnia.       Physical Exam  Laboratory/Imaging   Hemodynamics  Temp (24hrs), Av.4 °C (97.5 °F), Min:36.1 °C (96.9 °F), Max:36.6 °C (97.9 °F)   Temperature: 36.5 °C (97.7 °F)  Pulse  Av.8  Min: 59  Max: 99    Blood Pressure: 107/55      Respiratory  Respiration: 18, Pulse Oximetry: 95 %    Fluids  No intake or output data in the 24 hours ending 18 1558     Nutrition  Orders Placed This Encounter   Procedures   • Diet Order     Standing Status:   Standing     Number of Occurrences:   1     Order Specific Question:   Diet:     Answer:   Regular [1]     Physical Exam   Constitutional: She is oriented to person, place, and time. She appears well-developed and well-nourished. No distress.   Obese   HENT:   Head: Normocephalic and atraumatic.   Eyes: EOM are normal. Pupils are equal, round, and reactive to light. Right eye exhibits no discharge. Left eye exhibits no discharge. No scleral icterus.   Neck: Normal range of motion. Neck supple. No JVD present.   Cardiovascular: Normal rate, regular rhythm, normal heart sounds and intact distal pulses.  Exam reveals no gallop and no friction rub.    No murmur heard.  Pulmonary/Chest: Effort normal. No accessory muscle usage or stridor. No tachypnea. No respiratory distress. She has no decreased breath sounds. She has no wheezes. She has no rhonchi. She has no rales.   Abdominal: Soft. Bowel sounds are normal. She exhibits no distension. There is no tenderness. There is no rebound and no guarding.   Musculoskeletal: Normal range of motion. She exhibits no edema.   Neurological: She is alert and oriented to person, place, and time.   Skin: Skin is warm and dry. No rash noted. She is not diaphoretic. No erythema. No pallor.   Posterior lumbar surgical incision is open to air at the time of exam.  Is well-approximated, clean, dry, & intact.  No erythema or edema noted.  Sutures present.   Psychiatric: She has a  normal mood and affect. Her behavior is normal. Judgment and thought content normal.   Nursing note and vitals reviewed.    Recent Labs      05/21/18   1650  05/23/18   0345   WBC  11.1*  9.7   RBC  4.12*  4.13*   HEMOGLOBIN  12.4  12.6   HEMATOCRIT  39.9  40.5   MCV  96.8  98.1*   MCH  30.1  30.5   MCHC  31.1*  31.1*   RDW  49.0  49.0   PLATELETCT  279  279   MPV  10.9  10.9                          Assessment/Plan     Pneumonia   Assessment & Plan    Today is day 4 of IV unasyn & azithromycin.    Discharging on 7 day course of augmentin.            Hypoxia   Assessment & Plan    Starting to resolve.  Low suspicion of PE. Showing clinical improvement on antibiotics.  Discharge home with O2.  Continue frequent ambulation.  Continue to work with I.S.          Postoperative back pain   Assessment & Plan    Feels she can better control the pain at home.  Discharge pain meds already ordered by neurosurgery.        Neuroforaminal stenosis of lumbosacral spine- (present on admission)   Assessment & Plan    S/p posterior L3-S1 laminectomy & fusion by Dr. Blackwell on 5/18/18.  Site is uncomplicated.    Dressing management per neurosurgery.  Follow up with neurosurgery as directed by them.        Multiple sclerosis (HCC)- (present on admission)   Assessment & Plan    Chronic, stable  Follow up outpatient          Quality-Core Measures   Reviewed items::  Labs reviewed, Medications reviewed and Radiology images reviewed  Seals catheter::  No Seals  DVT prophylaxis pharmacological::  Enoxaparin (Lovenox)  DVT prophylaxis - mechanical:  Not indicated at this time, ambulatory  Ulcer Prophylaxis::  Not indicated  Antibiotics:  Treating active infection/contamination beyond 24 hours perioperative coverage  Assessed for rehabilitation services:  Patient was assess for and/or received rehabilitation services during this hospitalization

## 2018-05-24 NOTE — PROGRESS NOTES
Neurosurgery Progress Note    Subjective:  Ambulatory, voiding, had BMs  D/w medical team, Echo shows LVEF >65%  Started on unasyn, azithromycin, responding well clinically  No leg pain, numbness, tingling, weakness   Pain well controlled on oral medications  Denies new pain, numbness, weakness.   Denies HA, positional HA, N/V, dizziness, chest pain, SOB, difficulty breathing, chills    Exam:  VSS with O2 3L via NC  A&Ox4, NAD  NM: 5/5 hip flexion, knee extension, knee flexion, plantar flexion, dorsiflexion, EHL   Sensation intact and equal throughout all four extremities.   Abdomen: soft, non-tender  Non-labored breathing, normal respiratory effort.   Capillary refill in all four extremities <2 seconds  No LE edema, erythema, cyanosis, clubbing  Calves non-tender to compression bilat  Incision well healed     BP  Min: 92/46  Max: 97/56  Pulse  Av.5  Min: 64  Max: 80  Resp  Av.8  Min: 16  Max: 18  Temp  Av.3 °C (97.4 °F)  Min: 36.1 °C (96.9 °F)  Max: 36.6 °C (97.9 °F)  SpO2  Av.8 %  Min: 93 %  Max: 97 %    No Data Recorded    Recent Labs      18   1650  18   0345   WBC  11.1*  9.7   RBC  4.12*  4.13*   HEMOGLOBIN  12.4  12.6   HEMATOCRIT  39.9  40.5   MCV  96.8  98.1*   MCH  30.1  30.5   MCHC  31.1*  31.1*   RDW  49.0  49.0   PLATELETCT  279  279   MPV  10.9  10.9                   Intake/Output     None          No intake or output data in the 24 hours ending 18 0828         • enoxaparin (LOVENOX) injection  30 mg Q12HRS   • magnesium hydroxide  60 mL DAILY   • ampicillin-sulbactam (UNASYN) IV  3 g Q6HRS   • azithromycin  250 mg DAILY   • carisoprodol  350 mg Q6HRS PRN   • oxyCODONE immediate-release  10 mg Q4HRS PRN   • LR   Continuous   • levothyroxine  150 mcg AM ES   • Pharmacy Consult Request  1 Each PRN   • MD ALERT...Do not administer NSAIDS or ASPIRIN unless ORDERED By Neurosurgery  1 Each PRN   • docusate sodium  100 mg BID   • senna-docusate  1 Tab Nightly   •  senna-docusate  1 Tab Q24HRS PRN   • polyethylene glycol/lytes  1 Packet BID PRN   • fleet  1 Each Once PRN   • acetaminophen  500 mg Q6HRS PRN   • diphenhydrAMINE  25 mg Q6HRS PRN    Or   • diphenhydrAMINE  25 mg Q6HRS PRN   • ondansetron  4 mg Q4HRS PRN   • ondansetron  4 mg Q4HRS PRN   • promethazine  12.5-25 mg Q4HRS PRN   • promethazine  12.5-25 mg Q4HRS PRN   • prochlorperazine  5-10 mg Q4HRS PRN   • cloNIDine  0.1 mg Q4HRS PRN   • benzocaine-menthol  1 Lozenge Q2HRS PRN   • vitamin D  5,000 Units DAILY   • calcium carbonate  500 mg BID   • HYDROcodone/acetaminophen  1-2 Tab Q4HRS PRN   • HYDROmorphone  1 mg Q4HRS PRN       Assessment and Plan:  POD #6 L3-S1 laminectomy, onlay fusion   Ambulate, work with therapies  Continue incentive spirometry Q1H  Continue pain control  Continue stool softeners to encourage BM, add suppository today  D/w Dr. Nava this AM.   OK to d/c to home with HH from neurosurgery standpoint. Will put all d/c meds and orders in.   Would appreciate hospitalist recommendations and precriptions for abx upon d/c to home   Will order CXR prior to post op appt in 1 week.   Thank you to all teams involved in the care of this patient  Needs dressing changed prior to d/c

## 2018-05-24 NOTE — DISCHARGE INSTRUCTIONS
Continue to wear brace as directed, may remove for showering.   No bending, lifting or twisting.   Daily OTC laxative while on narcotics.   No aspirin, NSAID or blood thinners x 2 weeks.  Ambulate often to prevent DVT  Continue incentive spirometer hourly  F/u with [facility] in 2 weeks    When You Have Pneumonia  You have been diagnosed with pneumonia. This is a serious lung infection. Most cases of pneumonia are caused by bacteria. Pneumonia most often occurs in older adults, young children, and people with chronic health problems.  Home care  · Take your medicine exactly as directed. Don’t skip doses. Continue taking your antibiotics as until they are all gone, even if you start to feel better. This will prevent the pneumonia from coming back.  · Drink at least 8 glasses of water daily, unless directed otherwise. This helps to loosen and thin secretions so that you can cough them up.  · Use a cool-mist humidifier in your bedroom. Be sure to clean the humidifier daily.  · Don’t use medicines to suppress your cough unless your cough is dry, painful, or interferes with your sleep. Coughing up mucus is normal. You may use an expectorant if your healthcare provider says it’s okay.  · You can use warm compresses or a heating pad on the lowest setting to relieve chest discomfort. Use several times a day for 15-20 minutes at a time. To prevent injury to your skin, set the temperature to warm, not hot. Don’t put the compress or pad directly on your skin. Make certain it has a cover or wrap it in a towel. This is to prevent skin burns.  · Get plenty of rest until your fever, shortness of breath, and chest pain go away.  · Plan to get a flu shot every year. The flu is a common cause of pneumonia. Getting a flu shot every year can help prevent both the flu and pneumonia.  Getting the pneumococcal vaccine  Talk with your healthcare provider about getting the pneumococcal vaccine. Pneumococcal pneumonia is caused by  bacteria that spread from person to person. It can cause minor problems, such as ear infections. But it can also turn into life-threatening illnesses of the lungs (pneumonia), the covering of the brain and spinal cord (meningitis), and the blood (bacteremia).  Children under 2 years of age, adults over age 65, people with certain health conditions, and smokers are at the highest risk of pneumococcal disease. This vaccine can help prevent pneumococcal disease in both adults and children. Some people should not have the vaccine. Make sure to ask your healthcare provider if you should have the vaccine.   Follow-up care  Make a follow-up appointment as directed by our staff.  When to call your healthcare provider  Call your healthcare provider right away if you have any of the following:  · Fever of 100.4°F (38°C) or higher, or as directed by your healthcare provider  · Mucus from the lungs (sputum) that’s yellow, green, bloody, or smells bad  · A large amount of sputum  · Vomiting  · Symptoms that get worse  Call 911  Call 911 right away if you have any of the following:  · Chest pain  · Trouble breathing  · Blue lips or fingernails       Spinal Stenosis  Spinal stenosis is when the open spaces between the bones of your spine (vertebrae) get smaller (narrow). It is caused by bone pushing on the open spaces of your spine. This puts pressure on your spine and the nerves in your spine. You may be given medicine to lessen the puffiness (inflammation) in your nerves. Other times, surgery is needed.  HOME CARE  · Change positions when you sit, stand, and lie. This can help take pressure off your nerves.  · Do exercises as told by your doctor to strengthen the middle part of your body.  · Lose weight if your doctor suggests it. This takes pressure off your spine.  · Take all medicine as told by your doctor.  MAKE SURE YOU:  · Understand these instructions.  · Will watch your condition.  · Will get help right away if you are  not doing well or get worse.  This information is not intended to replace advice given to you by your health care provider. Make sure you discuss any questions you have with your health care provider.  Document Released: 04/12/2012 Document Revised: 08/20/2014 Document Reviewed: 03/28/2014  [employer] Interactive Patient Education © 2017 [employer]      Discharge Instructions    Discharged to home by car with relative. Discharged via wheelchair, hospital escort: Yes.  Special equipment needed: TLSO    Be sure to schedule a follow-up appointment with your primary care doctor or any specialists as instructed.     Discharge Plan:   Influenza Vaccine Indication: Patient Refuses    I understand that a diet low in cholesterol, fat, and sodium is recommended for good health. Unless I have been given specific instructions below for another diet, I accept this instruction as my diet prescription.   Other diet: Regular    Special Instructions: None    · Is patient discharged on Warfarin / Coumadin?   No     Depression / Suicide Risk    As you are discharged from this [facility] facility, it is important to learn how to keep safe from harming yourself.    Recognize the warning signs:  · Abrupt changes in personality, positive or negative- including increase in energy   · Giving away possessions  · Change in eating patterns- significant weight changes-  positive or negative  · Change in sleeping patterns- unable to sleep or sleeping all the time   · Unwillingness or inability to communicate  · Depression  · Unusual sadness, discouragement and loneliness  · Talk of wanting to die  · Neglect of personal appearance   · Rebelliousness- reckless behavior  · Withdrawal from people/activities they love  · Confusion- inability to concentrate     If you or a loved one observes any of these behaviors or has concerns about self-harm, here's what you can do:  · Talk about it- your feelings and reasons for harming yourself  · Remove any  means that you might use to hurt yourself (examples: pills, rope, extension cords, firearm)  · Get professional help from the community (Mental Health, Substance Abuse, psychological counseling)  · Do not be alone:Call your Safe Contact- someone whom you trust who will be there for you.  · Call your local CRISIS HOTLINE 895-2975 or 956-332-3563  · Call your local Children's Mobile Crisis Response Team [place] (273) 866-5477 or www.[employer]inhood  · Call the toll free National Suicide Prevention Hotlines   · National Suicide Prevention Lifeline 348-628-WJPF (4784)  · National Hope Line Network 800-SUICIDE (974-3105)

## 2018-05-25 NOTE — DISCHARGE SUMMARY
DATE OF ADMISSION:  05/18/2018    DATE OF DISCHARGE:  05/24/2018    ADMITTING DIAGNOSIS:  L3-S1 severe stenosis with facet arthropathy.    DISCHARGE DIAGNOSES:  1.  L3-S1 severe stenosis, facet arthropathy.  2.  Also diagnosed with pneumonia.  3.  Hypoxia.    PRINCIPAL PROCEDURE PERFORMED BY:  Torsten Blackwell MD    ASSISTANT:  Carl Conrad MD.    COMPLICATIONS:  No complications.    Patient was extubated and brought to recovery, then to the neurosurgery floor.    PRINCIPAL PROCEDURE PERFORMED:  1.  L3-S1 decompressive laminectomy, bilateral medial facetectomy, bilateral   laminotomies  2.  L2-S1 posterolateral onlay fusion.    HOSPITAL COURSE:  After patient was brought to the neurosurgery floor, she was   evaluated on postoperative day #1.  She was ambulatory and she stated that   her left lower extremity pain and strength were improved in comparison with   her preoperative symptoms.  Her Seals was in and this was discontinued on   postop day #1 and she was able to void without any difficulty.  Her pain was   well controlled on oral medication.  She denied any new pain, numbness, or   weakness.  She was admitted for second night stay secondary to increased drain   output and 2 lumbar drains that the patient had placed intraoperatively.  On   postop day #2, the patient continued to progress ____ neuro status.  She was   able to pass gas, but did not have a bowel movement; however, patient on   postop day #2,  started having intermittent desaturations to 88% off   supplemental O2.  Incentive spirometer was increased.  Chest x-ray was   performed and she was admitted for a second night secondary to vital signs and   drain output.  On postop day #3, the medical team was consulted regarding   ongoing hypoxia on room air.  She was started on Unasyn and azithromycin per   medical team for what was thought to be hospital-acquired pneumonia.  They   continued with ongoing workup of the patient including echocardiogram.   She   was responding well to the antibiotic treatment and her oxygen requirements   were decreasing.  Patient throughout the course of her hospital stay denied   any new pain, numbness, or weakness.  She denied having any chest pain,   fevers, or shortness of breath.  Patient states that prior to surgery, she did   have a workup for sleep apnea and this was found to be negative.  She was   able to have a bowel movement prior to discharge.  She was discharged to home   in stable condition on postoperative day #6 with the following physical exam.    PHYSICAL EXAMINATION:  GENERAL:  Well-developed, well-nourished, in no apparent distress.  NEUROMUSCULAR:  A 5/5 hip flexion, knee extension, knee flexion,   plantarflexion, dorsiflexion, EHL.  Sensation intact and equal throughout all   4 extremities.  ABDOMEN:  Soft, nontender.  PULMONARY:  Nonlabored breathing, normal respiratory effort.  Capillary refill   less than 2 seconds in all 4 extremities.  LOWER EXTREMITIES:  No lower extremity edema, erythema, cyanosis, or clubbing.    Calves nontender to compression bilaterally.    Incision is clean, dry, and intact.  Margins well approximated.    DISCHARGE INSTRUCTIONS:  Patient will be discharged with the following   instructions:  Ambulate often to prevent DVT.  Continue incentive spirometer   hourly, daily over-the-counter stool softener while on narcotic medication.    No lifting greater than 10 pounds, no bending, no twisting.  Please follow up   with [facility] in 2 weeks.  Please call the office to confirm   date, time, and location of appointment that was made for you.    DISCHARGE MEDICATIONS:  The patient will be discharged with the following   medications:  1.  Oxycodone 10, take 1 p.o. q.4 hours p.r.n. pain.  2.  Soma 350, take 1 p.o. q.6 hours p.r.n. muscle spasms.  3.  Seven-day course of Augmentin per medical team.       ____________________________________     KOURTNEY Yeh /  TINY    DD:  05/25/2018 09:14:24  DT:  05/25/2018 13:48:19    D#:  [id number]  Job#:  [id number]

## 2018-05-26 NOTE — DISCHARGE SUMMARY
DATE OF ADMISSION:  05/18/2018    DATE OF DISCHARGE:  05/24/2018    ADMITTING DIAGNOSIS:  L3-S1 severe stenosis with facet arthropathy.    DISCHARGE DIAGNOSES:  1.  L3-S1 severe stenosis, facet arthropathy.  2.  Also diagnosed with pneumonia.  3.  Hypoxia.    PRINCIPAL PROCEDURE PERFORMED BY:  Torsten Blackwell MD    ASSISTANT:  Carl Conrad MD.    COMPLICATIONS:  No complications.    Patient was extubated and brought to recovery, then to the neurosurgery floor.    PRINCIPAL PROCEDURE PERFORMED:  1.  L3-S1 decompressive laminectomy, bilateral medial facetectomy, bilateral   laminotomies  2.  L2-S1 posterolateral onlay fusion.    HOSPITAL COURSE:  After patient was brought to the neurosurgery floor, she was   evaluated on postoperative day #1.  She was ambulatory and she stated that   her left lower extremity pain and strength were improved in comparison with   her preoperative symptoms.  Her Seals was in and this was discontinued on   postop day #1 and she was able to void without any difficulty.  Her pain was   well controlled on oral medication.  She denied any new pain, numbness, or   weakness.  She was admitted for second night stay secondary to increased drain   output and 2 lumbar drains that the patient had placed intraoperatively.  On   postop day #2, the patient continued to progress ____ neuro status.  She was   able to pass gas, but did not have a bowel movement; however, patient on   postop day #2,  started having intermittent desaturations to 88% off   supplemental O2.  Incentive spirometer was increased.  Chest x-ray was   performed and she was admitted for a second night secondary to vital signs and   drain output.  On postop day #3, the medical team was consulted regarding   ongoing hypoxia on room air.  She was started on Unasyn and azithromycin per   medical team for what was thought to be hospital-acquired pneumonia.  They   continued with ongoing workup of the patient including echocardiogram.   She   was responding well to the antibiotic treatment and her oxygen requirements   were decreasing.  Patient throughout the course of her hospital stay denied   any new pain, numbness, or weakness.  She denied having any chest pain,   fevers, or shortness of breath.  Patient states that prior to surgery, she did   have a workup for sleep apnea and this was found to be negative.  She was   able to have a bowel movement prior to discharge.  She was discharged to home   in stable condition on postoperative day #6 with the following physical exam.    PHYSICAL EXAMINATION:  GENERAL:  Well-developed, well-nourished, in no apparent distress.  NEUROMUSCULAR:  A 5/5 hip flexion, knee extension, knee flexion,   plantarflexion, dorsiflexion, EHL.  Sensation intact and equal throughout all   4 extremities.  ABDOMEN:  Soft, nontender.  PULMONARY:  Nonlabored breathing, normal respiratory effort.  Capillary refill   less than 2 seconds in all 4 extremities.  LOWER EXTREMITIES:  No lower extremity edema, erythema, cyanosis, or clubbing.    Calves nontender to compression bilaterally.    Incision is clean, dry, and intact.  Margins well approximated.    DISCHARGE INSTRUCTIONS:  Patient will be discharged with the following   instructions:  Ambulate often to prevent DVT.  Continue incentive spirometer   hourly, daily over-the-counter stool softener while on narcotic medication.    No lifting greater than 10 pounds, no bending, no twisting.  Please follow up   with [facility] in 2 weeks.  Please call the office to confirm   date, time, and location of appointment that was made for you.    DISCHARGE MEDICATIONS:  The patient will be discharged with the following   medications:  1.  Oxycodone 10, take 1 p.o. q.4 hours p.r.n. pain.  2.  Soma 350, take 1 p.o. q.6 hours p.r.n. muscle spasms.  3.  Seven-day course of Augmentin per medical team.       ____________________________________     KOURTNEY Yeh /  TINY    DD:  05/25/2018 09:14:24  DT:  05/25/2018 13:48:19    D#:  [id number]  Job#:  [id number]

## 2018-05-29 ENCOUNTER — OFFICE VISIT (OUTPATIENT)
Dept: MEDICAL GROUP | Facility: PHYSICIAN GROUP | Age: 63
End: 2018-05-29
Payer: MEDICARE

## 2018-05-29 VITALS
TEMPERATURE: 97 F | DIASTOLIC BLOOD PRESSURE: 80 MMHG | WEIGHT: 270.5 LBS | RESPIRATION RATE: 18 BRPM | OXYGEN SATURATION: 80 % | HEART RATE: 90 BPM | HEIGHT: 63 IN | BODY MASS INDEX: 47.93 KG/M2 | SYSTOLIC BLOOD PRESSURE: 122 MMHG

## 2018-05-29 DIAGNOSIS — G35 MULTIPLE SCLEROSIS (HCC): ICD-10-CM

## 2018-05-29 DIAGNOSIS — Z12.39 BREAST CANCER SCREENING: ICD-10-CM

## 2018-05-29 DIAGNOSIS — G89.29 OTHER CHRONIC PAIN: ICD-10-CM

## 2018-05-29 DIAGNOSIS — R09.02 HYPOXIA: ICD-10-CM

## 2018-05-29 DIAGNOSIS — J15.212 PNEUMONIA OF LEFT LOWER LOBE DUE TO METHICILLIN-RESISTANT STAPHYLOCOCCUS AUREUS (MRSA) (HCC): ICD-10-CM

## 2018-05-29 DIAGNOSIS — Z00.00 MEDICARE ANNUAL WELLNESS VISIT, SUBSEQUENT: ICD-10-CM

## 2018-05-29 PROBLEM — J18.9 LEFT LOWER LOBE PNEUMONIA: Status: ACTIVE | Noted: 2018-05-29

## 2018-05-29 PROBLEM — E03.9 ACQUIRED HYPOTHYROIDISM: Status: ACTIVE | Noted: 2018-05-29

## 2018-05-29 PROBLEM — J18.9 PNEUMONIA: Status: RESOLVED | Noted: 2018-05-21 | Resolved: 2018-05-29

## 2018-05-29 PROBLEM — E55.9 VITAMIN D DEFICIENCY: Status: ACTIVE | Noted: 2018-05-29

## 2018-05-29 PROBLEM — J30.1 SEASONAL ALLERGIC RHINITIS DUE TO POLLEN: Status: ACTIVE | Noted: 2018-05-29

## 2018-05-29 PROCEDURE — 99202 OFFICE O/P NEW SF 15 MIN: CPT | Performed by: FAMILY MEDICINE

## 2018-05-29 NOTE — PROGRESS NOTES
Complaint: follow-up pneumonia     Subjective:     Regina Richardson is a 62 y.o. female here today for follow-up of LLL pneumonia and low oxygen.    Hypoxia  On oxygen since developing pneumonia. Monitors O2 sats at home, can drop down to 70's at rest off. No hx gasping for air, wheezing.     Left lower lobe pneumonia (HCC)  Developed following back surgery. Followed by pulmonologists in hospital. Finishing course of Augmentin.    Multiple sclerosis (HCC)  Takes Soma for cramps. Still has some.    Chronic pain  Managed by Dr. Blackwell. On roxicodone q4-6h prn.     No other concerns of complaints today.    Current medicines (including changes today)  Current Outpatient Prescriptions   Medication Sig Dispense Refill   • amoxicillin-clavulanate (AUGMENTIN) 875-125 MG Tab Take 1 Tab by mouth 2 times a day for 7 days. 14 Tab 0   • magnesium hydroxide (MILK OF MAGNESIA) 400 MG/5ML Suspension Take 60 mL by mouth every day. 1 Bottle 0   • carisoprodol (SOMA) 350 MG Tab Take 1 Tab by mouth every 6 hours as needed for Muscle Spasms for up to 14 days. 60 Tab 0   • diphenhydrAMINE (BENADRYL) 25 MG Tab Take 25 mg by mouth at bedtime as needed for Sleep.     • Cholecalciferol (VITAMIN D PO) Take 3,000 Units by mouth every day.     • levothyroxine (SYNTHROID) 150 MCG Tab TAKE 1 TABLET(S) BY MOUTH DAILY  6   • oxyCODONE immediate release (ROXICODONE) 10 MG immediate release tablet Take 1 Tab by mouth every four hours as needed for up to 14 days. 90 Tab 0     No current facility-administered medications for this visit.      She  has a past medical history of Anesthesia (05/02/2018); Graves disease (05/02/2018); Gynecological disorder; Head ache; Incontinence of urine in female; Lumbar spinal stenosis; Multiple sclerosis (HCC); Muscle disorder; Ocular migraine; Pneumonia (2016); and Urinary incontinence.    Health Maintenance: needs mammograms      Allergies: Bee venom; Other misc; Sulfa drugs; and Tape    Current Outpatient  Prescriptions Ordered in Epic   Medication Sig Dispense Refill   • amoxicillin-clavulanate (AUGMENTIN) 875-125 MG Tab Take 1 Tab by mouth 2 times a day for 7 days. 14 Tab 0   • magnesium hydroxide (MILK OF MAGNESIA) 400 MG/5ML Suspension Take 60 mL by mouth every day. 1 Bottle 0   • carisoprodol (SOMA) 350 MG Tab Take 1 Tab by mouth every 6 hours as needed for Muscle Spasms for up to 14 days. 60 Tab 0   • diphenhydrAMINE (BENADRYL) 25 MG Tab Take 25 mg by mouth at bedtime as needed for Sleep.     • Cholecalciferol (VITAMIN D PO) Take 3,000 Units by mouth every day.     • levothyroxine (SYNTHROID) 150 MCG Tab TAKE 1 TABLET(S) BY MOUTH DAILY  6   • oxyCODONE immediate release (ROXICODONE) 10 MG immediate release tablet Take 1 Tab by mouth every four hours as needed for up to 14 days. 90 Tab 0     No current Epic-ordered facility-administered medications on file.        Past Medical History:   Diagnosis Date   • Anesthesia 05/02/2018    extreme nausea   • Graves disease 05/02/2018   • Gynecological disorder    • Head ache    • Incontinence of urine in female     pt states she has urinary incontinence   • Lumbar spinal stenosis    • Multiple sclerosis (HCC)    • Muscle disorder    • Ocular migraine    • Pneumonia 2016   • Urinary incontinence        Past Surgical History:   Procedure Laterality Date   • LUMBAR LAMINECTOMY DISKECTOMY  5/18/2018    Procedure: POSTERIOR LUMBAR LAMINECTOMY 3 - SACRAL 1;  Surgeon: Torsten Blackwell M.D.;  Location: [facility];  Service: Neurosurgery   • LUMBAR FUSION POSTERIOR  5/18/2018    Procedure: POSTERIOR LUMBAR 3 - SACRAL 1 ONLAY BONE FUSION WITH ALLOGRAFT;  Surgeon: Torsten Blackwell M.D.;  Location: [facility];  Service: Neurosurgery   • GYN SURGERY  2008    hysterectomy   • OTHER ABDOMINAL SURGERY  1997    gallbladder   • ABDOMINAL HYSTERECTOMY TOTAL     • CHOLECYSTECTOMY     • TONSILLECTOMY AND ADENOIDECTOMY         Social History   Substance Use Topics   •  "Smoking status: Former Smoker     Years: 15.00     Quit date: 5/17/2018   • Smokeless tobacco: Never Used   • Alcohol use No       Social History     Social History Narrative   • No narrative on file       Family History   Problem Relation Age of Onset   • Autoimmune Disease Daughter      MS   • Other Daughter      fibromyalgia         ROS left side lower back spasms, SOB when off oxygen.  Patient denies any fever, chills, unintentional weight gain/loss, fatigue, stroke symptoms, dizziness, headache, nasal congestion, sore-throat, cough, heartburn, chest pain, abdominal discomfort, diarrhea/constipation, burning with urination or frequency, joint, skin rashes, depression or anxiety.       Objective:     Blood pressure 122/80, pulse 90, temperature 36.1 °C (97 °F), resp. rate 18, height 1.6 m (5' 3\"), weight 122.7 kg (270 lb 8 oz), SpO2 (!) 80 %. Body mass index is 47.92 kg/m².   Physical Exam:  Constitutional: Alert, no distress. Obese, on O2  Neck: Trachea midline, no masses, no thyromegaly. No cervical or supraclavicular lymphadenopathy  Respiratory: Unlabored respiratory effort, lungs clear to auscultation, no wheezes, no ronchi.  Cardiovascular: Normal S1, S2, no murmur, no extremity edema.  Psych: Alert and oriented x3, appropriate affect and mood.        Assessment and Plan:   The following treatment plan was discussed    1. Hypoxia  Subacute problem. Will attempt to wean off. May need CT chest for further evaluation if pneumonia clears.    2. Pneumonia of left lower lobe due to methicillin-resistant Staphylococcus aureus (MRSA) (HCC)  Acute problem, resolving. Patient to finish augmentin. Recheck CXR next week.  - DX-CHEST-2 VIEWS; Future    3. Multiple sclerosis (HCC)  Chronic problem. Will assume prescription of Soma, when needed. Pt aware of [place] law pertaining to CS. Already on CS agreements with Dr. Bowman and Rishi. Low risk of abuse. May request RX when needed. [facility] next visit.  - CONTROLLED SUBSTANCE " TREATMENT AGREEMENT    4. Breast cancer screening  Pt agreeable to referral for screening mammograms  - MA-SCREEN MAMMO W/CAD-BILAT; Future    Followup: Return in about 1 week (around 6/5/2018), or f/u pneumonia.    Please note that this dictation was created using voice recognition software. I have made every reasonable attempt to correct obvious errors, but I expect that there are errors of grammar and possibly content that I did not discover before finalizing the note.

## 2018-05-29 NOTE — LETTER
[facility]  Dinesh Parra M.D.  [address]  [facility] [address]  Fax: [phone]   Authorization for Release/Disclosure of   Protected Health Information   Name: REGINA RICHARDSON : 1955 SSN: [id number]   Address: [address] [address] Phone:    [phone] (home)    I authorize the entity listed below to release/disclose the PHI below to:   [facility]/Dinesh Parra M.D. and Dinesh Parra M.D.   Provider or Entity Name:     Address   City, State, [facility]   Phone:      Fax:     Reason for request: continuity of care   Information to be released:    [  ] LAST COLONOSCOPY,  including any PATH REPORT and follow-up  [  ] LAST FIT/COLOGUARD RESULT [  ] LAST DEXA  [  ] LAST MAMMOGRAM  [  ] LAST PAP  [  ] LAST LABS [  ] RETINA EXAM REPORT  [  ] IMMUNIZATION RECORDS  [  ] Release all info      [  ] Check here and initial the line next to each item to release ALL health information INCLUDING  _____ Care and treatment for drug and / or alcohol abuse  _____ HIV testing, infection status, or AIDS  _____ Genetic Testing    DATES OF SERVICE OR TIME PERIOD TO BE DISCLOSED: _____________  I understand and acknowledge that:  * This Authorization may be revoked at any time by you in writing, except if your health information has already been used or disclosed.  * Your health information that will be used or disclosed as a result of you signing this authorization could be re-disclosed by the recipient. If this occurs, your re-disclosed health information may no longer be protected by State or Federal laws.  * You may refuse to sign this Authorization. Your refusal will not affect your ability to obtain treatment.  * This Authorization becomes effective upon signing and will  on (date) __________.      If no date is indicated, this Authorization will  one (1) year from the signature date.    Name: Regina Richardson    Signature:   Date:     2018       PLEASE FAX  REQUESTED RECORDS BACK TO: [phone]

## 2018-05-29 NOTE — ASSESSMENT & PLAN NOTE
On oxygen since developing pneumonia. Monitors O2 sats at home, can drop down to 70's at rest off. No hx gasping for air, wheezing.

## 2018-05-29 NOTE — ASSESSMENT & PLAN NOTE
Developed following back surgery. Followed by pulmonologists in hospital. Finishing course of Augmentin.

## 2018-09-05 ENCOUNTER — OFFICE VISIT (OUTPATIENT)
Dept: NEUROLOGY | Facility: MEDICAL CENTER | Age: 63
End: 2018-09-05
Payer: MEDICARE

## 2018-09-05 VITALS
WEIGHT: 266.4 LBS | OXYGEN SATURATION: 94 % | TEMPERATURE: 97.3 F | DIASTOLIC BLOOD PRESSURE: 72 MMHG | HEIGHT: 63 IN | HEART RATE: 105 BPM | SYSTOLIC BLOOD PRESSURE: 120 MMHG | BODY MASS INDEX: 47.2 KG/M2

## 2018-09-05 DIAGNOSIS — G35 MULTIPLE SCLEROSIS (HCC): ICD-10-CM

## 2018-09-05 DIAGNOSIS — R25.2 SPASTICITY: ICD-10-CM

## 2018-09-05 PROCEDURE — 99215 OFFICE O/P EST HI 40 MIN: CPT | Performed by: PSYCHIATRY & NEUROLOGY

## 2018-09-05 RX ORDER — DIAZEPAM 10 MG/1
10 TABLET ORAL EVERY 6 HOURS PRN
Qty: 60 TAB | Refills: 2 | Status: SHIPPED | OUTPATIENT
Start: 2018-09-05 | End: 2018-10-05

## 2018-09-05 NOTE — PROGRESS NOTES
"CC: Multiple Sclerosis       HPI:    Regina Richardson is a 63 y.o. Female w h/o hypothyroidism who presents today in neurologic follow up for multiple sclerosis. Ms. Richardson is now s/p a posterior L3-S1 laminectomy and fusion by Dr. Blackwell on 5/18/18. She was doing very well after her surgery, but about two weeks ago, twisted while getting into her 's car and ever since has had burning pain in her low back. The pain sometimes wraps around the front of her abdomen, which she had always interpreted as the \"MS hug\" and so she is not sure whether this is from the MS or her back. She can now feel sensation in her left foot.      She has not taken any narcotic medications for about two months now and wishes to stay off of them. She is still utilizing Valium and Soma for her spasticity. She had a collapsed lung and pneumonia after the surgery which has now improved.      MS History:  Diagnosed: 1995  First presentation: 1991 [facility]  Disease modifying treatments: Betaseron (had suicidal ideations). Copaxone. Avonex.   Former or other neurologist:   Last attack:   Last MRI:  10/6/17 brain, cervical, thoracic w/wo      ROS:   Constitutional: No fevers or chills.  Eyes: No blurry vision or eye pain.  ENT: No dysphagia or hearing loss.  Respiratory: No cough or shortness of breath.  Cardiovascular: No chest pain or palpitations.  GI: No nausea, vomiting, or diarrhea.  : No urinary incontinence or dysuria.  Musculoskeletal: No joint swelling or arthralgias.  Skin: No skin rashes.  Neuro: No headaches, dizziness, or tremors.  Endocrine: No heat or cold intolerance. No polydipsia or polyuria.  Psych: No depression or anxiety.  Heme/Lymph: No easy bruising or swollen lymph nodes.  All other review of systems were reviewed and were negative.     Past Medical History:   Past Medical History:   Diagnosis Date   • Anesthesia 05/02/2018    extreme nausea   • Graves disease 05/02/2018   • Pneumonia 2016   • Gynecological " disorder    • Head ache    • Incontinence of urine in female     pt states she has urinary incontinence   • Lumbar spinal stenosis    • Multiple sclerosis (HCC)    • Muscle disorder    • Ocular migraine    • Urinary incontinence        Past Surgical History:   Past Surgical History:   Procedure Laterality Date   • LUMBAR LAMINECTOMY DISKECTOMY  5/18/2018    Procedure: POSTERIOR LUMBAR LAMINECTOMY 3 - SACRAL 1;  Surgeon: Torsten Blackwell M.D.;  Location: SURGERY [facility];  Service: Neurosurgery   • LUMBAR FUSION POSTERIOR  5/18/2018    Procedure: POSTERIOR LUMBAR 3 - SACRAL 1 ONLAY BONE FUSION WITH ALLOGRAFT;  Surgeon: Torsten Blackwell M.D.;  Location: SURGERY [facility];  Service: Neurosurgery   • GYN SURGERY  2008    hysterectomy   • OTHER ABDOMINAL SURGERY  1997    gallbladder   • ABDOMINAL HYSTERECTOMY TOTAL     • CHOLECYSTECTOMY     • TONSILLECTOMY AND ADENOIDECTOMY         Social History:   Social History     Social History   • Marital status:      Spouse name: N/A   • Number of children: N/A   • Years of education: N/A     Occupational History   • Not on file.     Social History Main Topics   • Smoking status: Former Smoker     Years: 15.00     Quit date: 5/17/2018   • Smokeless tobacco: Never Used   • Alcohol use No   • Drug use: No   • Sexual activity: Not on file     Other Topics Concern   • Not on file     Social History Narrative   • No narrative on file       Family Hx:   Family History   Problem Relation Age of Onset   • Autoimmune Disease Daughter         MS   • Other Daughter         fibromyalgia       Current Medications:   Current Outpatient Prescriptions:   •  diazepam (VALIUM) 10 MG tablet, Take 1 Tab by mouth every 6 hours as needed (spasticity) for up to 30 days., Disp: 60 Tab, Rfl: 2  •  diphenhydrAMINE (BENADRYL) 25 MG Tab, Take 25 mg by mouth at bedtime as needed for Sleep., Disp: , Rfl:   •  Cholecalciferol (VITAMIN D PO), Take 3,000 Units by mouth every day., Disp: , Rfl:  "  •  levothyroxine (SYNTHROID) 150 MCG Tab, TAKE 1 TABLET(S) BY MOUTH DAILY, Disp: , Rfl: 6  •  levothyroxine (SYNTHROID) 150 MCG Tab, Take 150 mcg by mouth Every morning on an empty stomach., Disp: , Rfl:   •  carisoprodol (SOMA) 350 MG Tab, Take 350 mg by mouth 2 times a day., Disp: , Rfl:   •  Cholecalciferol (VITAMIN D) 2000 UNITS Cap, Take  by mouth., Disp: , Rfl:   •  magnesium hydroxide (MILK OF MAGNESIA) 400 MG/5ML Suspension, Take 60 mL by mouth every day., Disp: 1 Bottle, Rfl: 0    Allergies:   Allergies   Allergen Reactions   • Bee Venom Anaphylaxis     Has epi pen   • Other Misc Swelling     Surgical steel   • Sulfa Drugs    • Sulfa Drugs      As child; unknown rxn   • Tape Hives     Adhesive tape allergy         Physical Exam:   Ambulatory Vitals  Encounter Vitals  Temperature: 36.3 °C (97.3 °F)  Temp Source: Temporal  Blood Pressure: 120/72  BP Location: Left, Forearm  Patient BP Position: Sitting  Pulse: (!) 105  Pulse Oximetry: 94 %  Weight: 120.8 kg (266 lb 6.4 oz)  Weight Source: Stand Up Scale  Height: 160 cm (5' 3\")  BMI (Calculated): 47.19    Constitutional: Well-developed, well-nourished, good hygiene. Appears stated age.  Cardiovascular: RRR, with no murmurs, rubs or gallops. No carotid bruits. No peripheral edema, pedal pulses intact.   Respiratory: Lungs CTA B/L, no W/R/R.   Skin: Warm, dry, intact. No rashes observed.  Eyes: Sclera anicteric.   Funduscopic: Optic discs flat with no evidence of papilledema or pallor. Normal posterior segments.  Neurologic:   Mental Status: Awake, alert, oriented x 3.   Speech: Fluent with normal prosody.   Memory: Able to recall recent and remote events accurately.    Concentration: Attentive. Able to focus on history and follow multi-step commands.   Fund of Knowledge: Appropriate.   Cranial Nerves:    CN II: PERRL. No afferent pupillary defect.    CN III, IV, VI: Good eye closure, EOMI.     CN V: Facial sensation intact and symmetric.     CN VII: No facial " asymmetry.     CN VIII: Hearing intact.     CN IX and X: Palate elevates symmetrically. Normal gag reflex.    CN XI: Symmetric shoulder shrug.     CN XII: Tongue midline.    Sensory: Intact light touch, vibration and temperature.    Coordination: No evidence of past-pointing on finger to nose testing, no dysdiadochokinesia. Heel to shin intact.    DTR's: 3+ in the bilateral biceps, triceps. Unable to elicit in the right knee, 1+ in the left, 1+ in bilateral ankles.   Babinski: Toes downgoing bilaterally.   Movements: No tremors observed.   Musculoskeletal:    Strength: Right hip flexor 2/5, left hip flexor 3/5. Right knee flexor/extensor 3/5, left 2/5. Left foot drop. Right ankle DF/PF 4/5.    Gait: Steady, narrow based.    Tone: Normal bulk and tone.   Joints: No swelling.       Assessment/Plan:  Multiple sclerosis (HCC)   63 y.o. Female w h/o hypothyroidism diagnosed with MS in 1995, not currently on any disease modifying treatments, and who recently underwent lumbo-sacral spinal fusion. While she had a recent setback after twisting her low back, overall, she is doing better in terms of her gait and her left foot has regained sensation. She appears more steady with walking.     Plan:  1. For spasticity can continue with Valium 10mg up to twice a day prn. Okay to continue with Soma 350mg up to BID prn. Rx for Valium given with #60 and 2 refills.  2. She will continue with PT per recommendations from Dr. Blackwell's office.  3. Once she has recovered more fully from her surgery we will consider repeating her MRI's for MS surveillance.      Greater than 50% of this 40 minute face to face follow up encounter was devoted to disease state counseling on Multiple Sclerosis and coordination of care. (see above assessment and plan for further details of discussion).       Sofia Mejias D.O., M.P.H  MS specialist.   Board Certified Neurologist.  Neurology Clerkship Director, [facility] [employer].     Neurology,  [facility].   Tel: [phone]  Fax: [phone]

## 2018-09-05 NOTE — ASSESSMENT & PLAN NOTE
63 y.o. Female w h/o hypothyroidism diagnosed with MS in 1995, not currently on any disease modifying treatments, and who recently underwent lumbo-sacral spinal fusion. While she had a recent setback after twisting her low back, overall, she is doing better in terms of her gait and her left foot has regained sensation. She appears more steady with walking.     Plan:  1. For spasticity can continue with Valium 10mg up to twice a day prn. Okay to continue with Soma 350mg up to BID prn. Rx for Valium given with #60 and 2 refills.  2. She will continue with PT per recommendations from Dr. Blackwell's office.  3. Once she has recovered more fully from her surgery we will consider repeating her MRI's for MS surveillance.

## 2018-09-19 NOTE — ADDENDUM NOTE
Encounter addended by: Bhanu Jorge on: 9/19/2018  4:15 PM<BR>    Actions taken: Delete clinical note

## 2018-12-12 DIAGNOSIS — R25.2 SPASTICITY: ICD-10-CM

## 2018-12-12 DIAGNOSIS — G35 MULTIPLE SCLEROSIS (HCC): ICD-10-CM

## 2018-12-14 RX ORDER — CARISOPRODOL 350 MG/1
TABLET ORAL
Qty: 120 TAB | Refills: 2 | Status: SHIPPED | OUTPATIENT
Start: 2018-12-14 | End: 2019-01-31 | Stop reason: SDUPTHER

## 2019-01-31 ENCOUNTER — OFFICE VISIT (OUTPATIENT)
Dept: MEDICAL GROUP | Facility: PHYSICIAN GROUP | Age: 64
End: 2019-01-31
Payer: MEDICARE

## 2019-01-31 VITALS
SYSTOLIC BLOOD PRESSURE: 132 MMHG | HEIGHT: 68 IN | WEIGHT: 280 LBS | HEART RATE: 85 BPM | BODY MASS INDEX: 42.44 KG/M2 | TEMPERATURE: 97.3 F | DIASTOLIC BLOOD PRESSURE: 74 MMHG | RESPIRATION RATE: 14 BRPM | OXYGEN SATURATION: 95 %

## 2019-01-31 DIAGNOSIS — Z12.39 BREAST CANCER SCREENING: ICD-10-CM

## 2019-01-31 DIAGNOSIS — E03.9 ACQUIRED HYPOTHYROIDISM: ICD-10-CM

## 2019-01-31 DIAGNOSIS — R25.2 SPASTICITY: ICD-10-CM

## 2019-01-31 DIAGNOSIS — G35 MS (MULTIPLE SCLEROSIS) (HCC): ICD-10-CM

## 2019-01-31 PROBLEM — G89.29 CHRONIC PAIN: Status: RESOLVED | Noted: 2017-09-25 | Resolved: 2019-01-31

## 2019-01-31 PROBLEM — M54.9 POSTOPERATIVE BACK PAIN: Status: RESOLVED | Noted: 2018-05-22 | Resolved: 2019-01-31

## 2019-01-31 PROBLEM — M54.42 CHRONIC BILATERAL LOW BACK PAIN WITH BILATERAL SCIATICA: Status: RESOLVED | Noted: 2018-04-16 | Resolved: 2019-01-31

## 2019-01-31 PROBLEM — M48.07 NEUROFORAMINAL STENOSIS OF LUMBOSACRAL SPINE: Status: RESOLVED | Noted: 2018-01-21 | Resolved: 2019-01-31

## 2019-01-31 PROBLEM — M54.41 CHRONIC BILATERAL LOW BACK PAIN WITH BILATERAL SCIATICA: Status: RESOLVED | Noted: 2018-04-16 | Resolved: 2019-01-31

## 2019-01-31 PROBLEM — J18.9 LEFT LOWER LOBE PNEUMONIA: Status: RESOLVED | Noted: 2018-05-29 | Resolved: 2019-01-31

## 2019-01-31 PROBLEM — G89.29 CHRONIC BILATERAL LOW BACK PAIN WITH BILATERAL SCIATICA: Status: RESOLVED | Noted: 2018-04-16 | Resolved: 2019-01-31

## 2019-01-31 PROBLEM — G89.18 POSTOPERATIVE BACK PAIN: Status: RESOLVED | Noted: 2018-05-22 | Resolved: 2019-01-31

## 2019-01-31 PROBLEM — E55.9 VITAMIN D DEFICIENCY: Status: RESOLVED | Noted: 2018-05-29 | Resolved: 2019-01-31

## 2019-01-31 PROBLEM — R09.02 HYPOXIA: Status: RESOLVED | Noted: 2018-05-21 | Resolved: 2019-01-31

## 2019-01-31 PROCEDURE — 99213 OFFICE O/P EST LOW 20 MIN: CPT | Performed by: FAMILY MEDICINE

## 2019-01-31 RX ORDER — DIAZEPAM 10 MG/1
10 TABLET ORAL
Qty: 15 TAB | Refills: 0 | Status: SHIPPED | OUTPATIENT
Start: 2019-01-31 | End: 2019-02-15

## 2019-01-31 RX ORDER — CARISOPRODOL 350 MG/1
350 TABLET ORAL 2 TIMES DAILY
Qty: 30 TAB | Refills: 0 | Status: SHIPPED | OUTPATIENT
Start: 2019-01-31 | End: 2019-02-15

## 2019-01-31 RX ORDER — DIAZEPAM 10 MG/1
TABLET ORAL
Refills: 2 | COMMUNITY
Start: 2018-12-12 | End: 2019-01-31 | Stop reason: SDUPTHER

## 2019-01-31 ASSESSMENT — PATIENT HEALTH QUESTIONNAIRE - PHQ9: CLINICAL INTERPRETATION OF PHQ2 SCORE: 0

## 2019-02-01 NOTE — PROGRESS NOTES
Complaint: thyroid problem, meds     Subjective:     Regina Richardson is a 63 y.o. female here today for f/u.    Acquired hypothyroidism  Thinks her thyroid supplementation isn't sufficient. Tired, low energy, hair falling out, cold intolerance.    Spasticity  Managed with Soma and Valium x years. Has been seeing a neurologist, but requesting I take over management.     No other concerns or complaints today.    Current medicines (including changes today)  Current Outpatient Prescriptions   Medication Sig Dispense Refill   • carisoprodol (SOMA) 350 MG Tab Take 1 Tab by mouth 2 Times a Day for 15 days. 30 Tab 0   • diazepam (VALIUM) 10 MG tablet Take 1 Tab by mouth 1 time daily as needed (MS) for up to 15 days. 15 Tab 0   • Cholecalciferol (VITAMIN D PO) Take 3,000 Units by mouth every day.     • levothyroxine (SYNTHROID) 150 MCG Tab Take 150 mcg by mouth Every morning on an empty stomach.     • magnesium hydroxide (MILK OF MAGNESIA) 400 MG/5ML Suspension Take 60 mL by mouth every day. (Patient not taking: Reported on 1/31/2019) 1 Bottle 0   • diphenhydrAMINE (BENADRYL) 25 MG Tab Take 25 mg by mouth at bedtime as needed for Sleep.       No current facility-administered medications for this visit.      She  has a past medical history of Anesthesia (05/02/2018); Graves disease (05/02/2018); Gynecological disorder; Head ache; Incontinence of urine in female; Lumbar spinal stenosis; Multiple sclerosis (HCC); Muscle disorder; Ocular migraine; Pneumonia (2016); and Urinary incontinence.    Health Maintenance: Needs mammogram      Allergies: Bee venom; Other misc; Sulfa drugs; Sulfa drugs; and Tape    Current Outpatient Prescriptions Ordered in [facility]   Medication Sig Dispense Refill   • carisoprodol (SOMA) 350 MG Tab Take 1 Tab by mouth 2 Times a Day for 15 days. 30 Tab 0   • diazepam (VALIUM) 10 MG tablet Take 1 Tab by mouth 1 time daily as needed (MS) for up to 15 days. 15 Tab 0   • Cholecalciferol (VITAMIN D PO) Take 3,000  Units by mouth every day.     • levothyroxine (SYNTHROID) 150 MCG Tab Take 150 mcg by mouth Every morning on an empty stomach.     • magnesium hydroxide (MILK OF MAGNESIA) 400 MG/5ML Suspension Take 60 mL by mouth every day. (Patient not taking: Reported on 1/31/2019) 1 Bottle 0   • diphenhydrAMINE (BENADRYL) 25 MG Tab Take 25 mg by mouth at bedtime as needed for Sleep.       No current Epic-ordered facility-administered medications on file.        Past Medical History:   Diagnosis Date   • Anesthesia 05/02/2018    extreme nausea   • Graves disease 05/02/2018   • Gynecological disorder    • Head ache    • Incontinence of urine in female     pt states she has urinary incontinence   • Lumbar spinal stenosis    • Multiple sclerosis (HCC)    • Muscle disorder    • Ocular migraine    • Pneumonia 2016   • Urinary incontinence        Past Surgical History:   Procedure Laterality Date   • LUMBAR LAMINECTOMY DISKECTOMY  5/18/2018    Procedure: POSTERIOR LUMBAR LAMINECTOMY 3 - SACRAL 1;  Surgeon: Torsten Blackwell M.D.;  Location: SURGERY [facility];  Service: Neurosurgery   • LUMBAR FUSION POSTERIOR  5/18/2018    Procedure: POSTERIOR LUMBAR 3 - SACRAL 1 ONLAY BONE FUSION WITH ALLOGRAFT;  Surgeon: Torsten Blackwell M.D.;  Location: SURGERY [facility];  Service: Neurosurgery   • GYN SURGERY  2008    hysterectomy   • OTHER ABDOMINAL SURGERY  1997    gallbladder   • ABDOMINAL HYSTERECTOMY TOTAL     • CHOLECYSTECTOMY     • TONSILLECTOMY AND ADENOIDECTOMY         Social History   Substance Use Topics   • Smoking status: Former Smoker     Years: 15.00     Quit date: 5/17/2018   • Smokeless tobacco: Never Used   • Alcohol use No       Social History     Social History Narrative   • No narrative on file       Family History   Problem Relation Age of Onset   • Autoimmune Disease Daughter         MS   • Other Daughter         fibromyalgia         ROS Positive for weight gain, low energy, cold intolerance, hair loss.  Patient  "denies any fever, chills,stroke symptoms, dizziness, headache, nasal congestion, sore-throat, cough, heartburn, chest pain, difficulty breathing, abdominal discomfort, diarrhea/constipation, burning with urination or frequency, joint or back pain, skin rashes, depression or anxiety.       Objective:     Blood pressure 132/74, pulse 85, temperature 36.3 °C (97.3 °F), resp. rate 14, height 1.727 m (5' 8\"), weight (!) 127 kg (280 lb), SpO2 95 %, not currently breastfeeding. Body mass index is 42.57 kg/m².   Physical Exam:  Constitutional: Alert, no distress.  No formal exam  Psych: Alert and oriented x3, appropriate affect and mood.        Assessment and Plan:   The following treatment plan was discussed    1. Acquired hypothyroidism  Chronic problem. Will notify with result, titrate.  - TSH WITH REFLEX TO FT4; Future    2. Breast cancer screening  Will notify with result.  - MA-SCREENING MAMMO BILAT W/TOMOSYNTHESIS W/CAD; Future    3. MS (multiple sclerosis) (HCC)/Spasticity  Patient agreeable to abide by a CS agreement and submit to UDS. Carloshemike queried, due.  - Controlled Substance Treatment Agreement  - carisoprodol (SOMA) 350 MG Tab; Take 1 Tab by mouth 2 Times a Day for 15 days.  Dispense: 30 Tab; Refill: 0  - [facility] SCREEN; Future  - diazepam (VALIUM) 10 MG tablet; Take 1 Tab by mouth 1 time daily as needed (MS) for up to 15 days.  Dispense: 15 Tab; Refill: 0      Followup: Return in about 2 weeks (around 2/14/2019).    Please note that this dictation was created using voice recognition software. I have made every reasonable attempt to correct obvious errors, but I expect that there are errors of grammar and possibly content that I did not discover before finalizing the note.           "

## 2019-02-01 NOTE — ASSESSMENT & PLAN NOTE
Thinks her thyroid supplementation isn't sufficient. Tired, low energy, hair falling out, cold intolerance.

## 2019-02-01 NOTE — ASSESSMENT & PLAN NOTE
Managed with Clay and Valium x years. Has been seeing a neurologist, but requesting I take over management.

## 2019-03-18 ENCOUNTER — OFFICE VISIT (OUTPATIENT)
Dept: MEDICAL GROUP | Facility: PHYSICIAN GROUP | Age: 64
End: 2019-03-18
Payer: MEDICARE

## 2019-03-18 VITALS
WEIGHT: 278 LBS | HEART RATE: 96 BPM | HEIGHT: 68 IN | SYSTOLIC BLOOD PRESSURE: 130 MMHG | TEMPERATURE: 100.1 F | DIASTOLIC BLOOD PRESSURE: 80 MMHG | OXYGEN SATURATION: 89 % | BODY MASS INDEX: 42.13 KG/M2 | RESPIRATION RATE: 16 BRPM

## 2019-03-18 DIAGNOSIS — J40 BRONCHITIS: ICD-10-CM

## 2019-03-18 PROCEDURE — 99214 OFFICE O/P EST MOD 30 MIN: CPT | Performed by: FAMILY MEDICINE

## 2019-03-18 RX ORDER — METHYLPREDNISOLONE 4 MG/1
TABLET ORAL
Qty: 21 TAB | Refills: 0 | Status: SHIPPED | OUTPATIENT
Start: 2019-03-18 | End: 2019-07-11

## 2019-03-18 RX ORDER — AMOXICILLIN 500 MG/1
500 CAPSULE ORAL 3 TIMES DAILY
Qty: 30 CAP | Refills: 0 | Status: SHIPPED | OUTPATIENT
Start: 2019-03-18 | End: 2019-07-11

## 2019-03-18 RX ORDER — CARISOPRODOL 350 MG/1
350 TABLET ORAL 4 TIMES DAILY
COMMUNITY
End: 2019-03-26 | Stop reason: SDUPTHER

## 2019-03-18 RX ORDER — DIAZEPAM 10 MG/1
10 TABLET ORAL EVERY 6 HOURS PRN
COMMUNITY
End: 2019-03-26 | Stop reason: SDUPTHER

## 2019-03-18 ASSESSMENT — ENCOUNTER SYMPTOMS
NEUROLOGICAL NEGATIVE: 1
BLURRED VISION: 0
HEMOPTYSIS: 0
CHILLS: 0
MYALGIAS: 0
NAUSEA: 0
COUGH: 1
DEPRESSION: 0
DOUBLE VISION: 0
TINGLING: 0
FEVER: 0
RHINORRHEA: 1
MUSCULOSKELETAL NEGATIVE: 1
DIZZINESS: 0
CONSTITUTIONAL NEGATIVE: 1
HEADACHES: 0
EYES NEGATIVE: 1
CARDIOVASCULAR NEGATIVE: 1
GASTROINTESTINAL NEGATIVE: 1
BRUISES/BLEEDS EASILY: 0
SHORTNESS OF BREATH: 1
HEARTBURN: 0
PSYCHIATRIC NEGATIVE: 1
PALPITATIONS: 0

## 2019-03-18 NOTE — PROGRESS NOTES
Subjective:      Regina Richardson is a 63 y.o. female who presents with Fever and Cough            1. Bronchitis  Non toxic appearing today with clear lungs will treat  - carisoprodol (SOMA) 350 MG Tab; Take 350 mg by mouth 4 times a day.  - diazepam (VALIUM) 10 MG tablet; Take 10 mg by mouth every 6 hours as needed for Anxiety.  - MethylPREDNISolone (MEDROL DOSEPAK) 4 MG Tablet Therapy Pack; As directed on the packaging label.  Dispense: 21 Tab; Refill: 0  - amoxicillin (AMOXIL) 500 MG Cap; Take 1 Cap by mouth 3 times a day.  Dispense: 30 Cap; Refill: 0    Past Medical History:  05/02/2018: Anesthesia      Comment:  extreme nausea  05/02/2018: Graves disease  No date: Gynecological disorder  No date: Head ache  No date: Incontinence of urine in female      Comment:  pt states she has urinary incontinence  No date: Lumbar spinal stenosis  No date: Multiple sclerosis (HCC)  No date: Muscle disorder  No date: Ocular migraine  2016: Pneumonia  No date: Urinary incontinence  Past Surgical History:  5/18/2018: LUMBAR LAMINECTOMY DISKECTOMY      Comment:  Procedure: POSTERIOR LUMBAR LAMINECTOMY 3 - SACRAL 1;                 Surgeon: Torsten Blackwell M.D.;  Location: SURGERY [facility];  Service: Neurosurgery  5/18/2018: LUMBAR FUSION POSTERIOR      Comment:  Procedure: POSTERIOR LUMBAR 3 - SACRAL 1 ONLAY BONE                FUSION WITH ALLOGRAFT;  Surgeon: Torsten Blackwell M.D.;                 Location: SURGERY [facility];  Service: Neurosurgery  2008: GYN SURGERY      Comment:  hysterectomy  1997: OTHER ABDOMINAL SURGERY      Comment:  gallbladder  No date: ABDOMINAL HYSTERECTOMY TOTAL  No date: CHOLECYSTECTOMY  No date: TONSILLECTOMY AND ADENOIDECTOMY  Smoking status: Former Smoker                                                              Packs/day: 0.00      Years: 15.00        Quit date: 5/17/2018  Smokeless tobacco: Never Used                      Alcohol use: No              Review of  patient's family history indicates:  Problem: Autoimmune Disease      Relation: Daughter       Age of Onset: (Not Specified)       Comment: MS  Problem: Other      Relation: Daughter       Age of Onset: (Not Specified)       Comment: fibromyalgia      Current Outpatient Prescriptions: •  carisoprodol (SOMA) 350 MG Tab, Take 350 mg by mouth 4 times a day., Disp: , Rfl: •  diazepam (VALIUM) 10 MG tablet, Take 10 mg by mouth every 6 hours as needed for Anxiety., Disp: , Rfl: •  MethylPREDNISolone (MEDROL DOSEPAK) 4 MG Tablet Therapy Pack, As directed on the packaging label., Disp: 21 Tab, Rfl: 0•  amoxicillin (AMOXIL) 500 MG Cap, Take 1 Cap by mouth 3 times a day., Disp: 30 Cap, Rfl: 0•  magnesium hydroxide (MILK OF MAGNESIA) 400 MG/5ML Suspension, Take 60 mL by mouth every day., Disp: 1 Bottle, Rfl: 0•  diphenhydrAMINE (BENADRYL) 25 MG Tab, Take 25 mg by mouth at bedtime as needed for Sleep., Disp: , Rfl: •  Cholecalciferol (VITAMIN D PO), Take 3,000 Units by mouth every day., Disp: , Rfl: •  levothyroxine (SYNTHROID) 150 MCG Tab, Take 150 mcg by mouth Every morning on an empty stomach., Disp: , Rfl:     Patient was instructed on the use of medications, either prescriptions or OTC and informed on when the appropriate follow up time period should be. In addition, patient was also instructed that should any acute worsening occur that they should notify this clinic asap or call 911.          Cough   This is a new problem. The current episode started 1 to 4 weeks ago. The problem has been waxing and waning. The problem occurs every few minutes. The cough is non-productive. Associated symptoms include nasal congestion, postnasal drip, rhinorrhea and shortness of breath. Pertinent negatives include no chest pain, chills, fever, headaches, heartburn, hemoptysis, myalgias or rash. Nothing aggravates the symptoms. She has tried rest and OTC cough suppressant for the symptoms. The treatment provided no relief.       Review of  "Systems   Constitutional: Negative.  Negative for chills and fever.   HENT: Positive for congestion, postnasal drip and rhinorrhea. Negative for hearing loss.    Eyes: Negative.  Negative for blurred vision and double vision.   Respiratory: Positive for cough and shortness of breath. Negative for hemoptysis.    Cardiovascular: Negative.  Negative for chest pain and palpitations.   Gastrointestinal: Negative.  Negative for heartburn and nausea.   Genitourinary: Negative.  Negative for dysuria.   Musculoskeletal: Negative.  Negative for myalgias.   Skin: Negative.  Negative for rash.   Neurological: Negative.  Negative for dizziness, tingling and headaches.   Endo/Heme/Allergies: Negative.  Does not bruise/bleed easily.   Psychiatric/Behavioral: Negative.  Negative for depression and suicidal ideas.   All other systems reviewed and are negative.         Objective:     /80 (BP Location: Left arm, Patient Position: Sitting)   Pulse 96   Temp 37.8 °C (100.1 °F)   Resp 16   Ht 1.727 m (5' 8\")   Wt (!) 126.1 kg (278 lb)   SpO2 89%   BMI 42.27 kg/m²      Physical Exam   Constitutional: She is oriented to person, place, and time. She appears well-developed and well-nourished. No distress.   HENT:   Head: Normocephalic and atraumatic.   Mouth/Throat: Oropharynx is clear and moist. No oropharyngeal exudate.   Eyes: Pupils are equal, round, and reactive to light.   Cardiovascular: Normal rate, regular rhythm, normal heart sounds and intact distal pulses.  Exam reveals no gallop and no friction rub.    No murmur heard.  Pulmonary/Chest: Effort normal and breath sounds normal. No respiratory distress. She has no wheezes. She has no rales. She exhibits no tenderness.   Patient today has a loose cough but on auscultation has no evident wheezes,rales or rhonchi     Neurological: She is alert and oriented to person, place, and time.   Skin: She is not diaphoretic.   Psychiatric: She has a normal mood and affect. Her " behavior is normal. Judgment and thought content normal.   Nursing note and vitals reviewed.              Assessment/Plan:     1. Bronchitis    - carisoprodol (SOMA) 350 MG Tab; Take 350 mg by mouth 4 times a day.  - diazepam (VALIUM) 10 MG tablet; Take 10 mg by mouth every 6 hours as needed for Anxiety.  - MethylPREDNISolone (MEDROL DOSEPAK) 4 MG Tablet Therapy Pack; As directed on the packaging label.  Dispense: 21 Tab; Refill: 0  - amoxicillin (AMOXIL) 500 MG Cap; Take 1 Cap by mouth 3 times a day.  Dispense: 30 Cap; Refill: 0

## 2019-03-26 DIAGNOSIS — J40 BRONCHITIS: ICD-10-CM

## 2019-03-26 DIAGNOSIS — R25.2 SPASTICITY: ICD-10-CM

## 2019-03-26 DIAGNOSIS — G35 MS (MULTIPLE SCLEROSIS) (HCC): ICD-10-CM

## 2019-03-26 RX ORDER — DIAZEPAM 10 MG/1
10 TABLET ORAL
Qty: 30 TAB | Refills: 2 | Status: SHIPPED | OUTPATIENT
Start: 2019-03-26 | End: 2019-04-25

## 2019-03-26 RX ORDER — CARISOPRODOL 350 MG/1
350 TABLET ORAL 2 TIMES DAILY PRN
Qty: 60 TAB | Refills: 2 | Status: SHIPPED | OUTPATIENT
Start: 2019-03-26 | End: 2019-04-25

## 2019-05-01 ENCOUNTER — TELEPHONE (OUTPATIENT)
Dept: MEDICAL GROUP | Facility: PHYSICIAN GROUP | Age: 64
End: 2019-05-01

## 2019-05-01 NOTE — TELEPHONE ENCOUNTER
Patients insurance isn't going to approve her Carisoprodol. Is there another medication that she can get prescribed.

## 2019-05-02 DIAGNOSIS — R25.2 SPASTICITY: ICD-10-CM

## 2019-05-02 RX ORDER — TIZANIDINE 4 MG/1
4 TABLET ORAL 3 TIMES DAILY PRN
Qty: 90 TAB | Refills: 1 | Status: SHIPPED | OUTPATIENT
Start: 2019-05-02 | End: 2019-07-11

## 2019-05-06 ENCOUNTER — TELEPHONE (OUTPATIENT)
Dept: MEDICAL GROUP | Facility: PHYSICIAN GROUP | Age: 64
End: 2019-05-06

## 2019-05-06 NOTE — TELEPHONE ENCOUNTER
[facility] has sent information regarding the patients rx of, Carisoprodol. This is not covered by insurance but below are recommended alternatives:  - Cyclobenzaprine 10mg  -Chlorzoxazone 500mg  -Tizanidine HCL 4mg    Please advise

## 2019-06-04 ENCOUNTER — TELEPHONE (OUTPATIENT)
Dept: MEDICAL GROUP | Facility: PHYSICIAN GROUP | Age: 64
End: 2019-06-04

## 2019-07-03 RX ORDER — DIAZEPAM 10 MG/1
TABLET ORAL
Qty: 30 TAB | Refills: 2 | OUTPATIENT
Start: 2019-07-03 | End: 2019-08-02

## 2019-07-03 NOTE — TELEPHONE ENCOUNTER
Was the patient seen in the last year in this department? Yes    Does patient have an active prescription for medications requested? No     Received Request Via: Pharmacy     LV: 01/31/19    Patient was notified of needed appt via Push IO.

## 2019-07-11 ENCOUNTER — OFFICE VISIT (OUTPATIENT)
Dept: MEDICAL GROUP | Facility: PHYSICIAN GROUP | Age: 64
End: 2019-07-11
Payer: MEDICARE

## 2019-07-11 VITALS
WEIGHT: 288.8 LBS | TEMPERATURE: 97.4 F | SYSTOLIC BLOOD PRESSURE: 128 MMHG | OXYGEN SATURATION: 96 % | RESPIRATION RATE: 14 BRPM | DIASTOLIC BLOOD PRESSURE: 76 MMHG | HEIGHT: 63 IN | BODY MASS INDEX: 51.17 KG/M2 | HEART RATE: 100 BPM

## 2019-07-11 DIAGNOSIS — Z12.39 BREAST CANCER SCREENING: ICD-10-CM

## 2019-07-11 DIAGNOSIS — E78.5 DYSLIPIDEMIA: ICD-10-CM

## 2019-07-11 DIAGNOSIS — R25.2 SPASTICITY: ICD-10-CM

## 2019-07-11 DIAGNOSIS — G35 MS (MULTIPLE SCLEROSIS) (HCC): ICD-10-CM

## 2019-07-11 DIAGNOSIS — E03.9 ACQUIRED HYPOTHYROIDISM: ICD-10-CM

## 2019-07-11 DIAGNOSIS — Z12.11 SCREENING FOR COLON CANCER: ICD-10-CM

## 2019-07-11 PROBLEM — J40 BRONCHITIS: Status: RESOLVED | Noted: 2019-03-26 | Resolved: 2019-07-11

## 2019-07-11 PROCEDURE — 99214 OFFICE O/P EST MOD 30 MIN: CPT | Performed by: FAMILY MEDICINE

## 2019-07-11 RX ORDER — CARISOPRODOL 350 MG/1
350 TABLET ORAL 4 TIMES DAILY
COMMUNITY
End: 2019-07-11 | Stop reason: SDUPTHER

## 2019-07-11 RX ORDER — CARISOPRODOL 350 MG/1
350 TABLET ORAL 2 TIMES DAILY PRN
Qty: 60 TAB | Refills: 2 | Status: SHIPPED | OUTPATIENT
Start: 2019-07-11 | End: 2019-07-11 | Stop reason: SDUPTHER

## 2019-07-11 RX ORDER — DIAZEPAM 10 MG/1
10 TABLET ORAL EVERY 12 HOURS PRN
Qty: 60 TAB | Refills: 2 | Status: SHIPPED | OUTPATIENT
Start: 2019-07-11 | End: 2019-10-09

## 2019-07-11 RX ORDER — DIAZEPAM 10 MG/1
10 TABLET ORAL EVERY 6 HOURS PRN
COMMUNITY
End: 2019-07-11 | Stop reason: SDUPTHER

## 2019-07-11 RX ORDER — CARISOPRODOL 350 MG/1
350 TABLET ORAL 4 TIMES DAILY
Status: CANCELLED | OUTPATIENT
Start: 2019-07-11

## 2019-07-11 RX ORDER — CARISOPRODOL 350 MG/1
350 TABLET ORAL 2 TIMES DAILY PRN
Qty: 60 TAB | Refills: 2 | Status: SHIPPED | OUTPATIENT
Start: 2019-07-25 | End: 2019-10-23

## 2019-07-11 NOTE — PROGRESS NOTES
Complaint: F/u, labs, med refills     Subjective:     Regina Richardson is a 63 y.o. female here today for f/u.    Acquired hypothyroidism  Did not get her thyroid function tested after last visit. On 150 mcg Synthroid/day. Has put on 10 lbs since last visit.    Spasticity  Doing ok on combo Valium and Soma, needs refills, due.     No other concerns or complaints. Wants a couple brown spots checked on her arms and back.    Current medicines (including changes today)  Current Outpatient Prescriptions   Medication Sig Dispense Refill   • diazepam (VALIUM) 10 MG tablet Take 1 Tab by mouth every 12 hours as needed for Anxiety for up to 90 days. 60 Tab 2   • [START ON 7/25/2019] carisoprodol (SOMA) 350 MG Tab Take 1 Tab by mouth 2 times a day as needed for Muscle Spasms for up to 90 days. 60 Tab 2   • Cholecalciferol (VITAMIN D PO) Take 3,000 Units by mouth every day.     • levothyroxine (SYNTHROID) 150 MCG Tab Take 150 mcg by mouth Every morning on an empty stomach.     • diphenhydrAMINE (BENADRYL) 25 MG Tab Take 25 mg by mouth at bedtime as needed for Sleep.       No current facility-administered medications for this visit.      She  has a past medical history of Anesthesia (05/02/2018); Graves disease (05/02/2018); Gynecological disorder; Head ache; Incontinence of urine in female; Lumbar spinal stenosis; Multiple sclerosis (HCC); Muscle disorder; Ocular migraine; Pneumonia (2016); and Urinary incontinence.    Health Maintenance: needs mammogram and colon cancer screening.      Allergies: Bee venom; Other misc; Sulfa drugs; Sulfa drugs; and Tape    Current Outpatient Prescriptions Ordered in [facility]   Medication Sig Dispense Refill   • diazepam (VALIUM) 10 MG tablet Take 1 Tab by mouth every 12 hours as needed for Anxiety for up to 90 days. 60 Tab 2   • [START ON 7/25/2019] carisoprodol (SOMA) 350 MG Tab Take 1 Tab by mouth 2 times a day as needed for Muscle Spasms for up to 90 days. 60 Tab 2   • Cholecalciferol (VITAMIN  D PO) Take 3,000 Units by mouth every day.     • levothyroxine (SYNTHROID) 150 MCG Tab Take 150 mcg by mouth Every morning on an empty stomach.     • diphenhydrAMINE (BENADRYL) 25 MG Tab Take 25 mg by mouth at bedtime as needed for Sleep.       No current Epic-ordered facility-administered medications on file.        Past Medical History:   Diagnosis Date   • Anesthesia 05/02/2018    extreme nausea   • Graves disease 05/02/2018   • Gynecological disorder    • Head ache    • Incontinence of urine in female     pt states she has urinary incontinence   • Lumbar spinal stenosis    • Multiple sclerosis (HCC)    • Muscle disorder    • Ocular migraine    • Pneumonia 2016   • Urinary incontinence        Past Surgical History:   Procedure Laterality Date   • LUMBAR LAMINECTOMY DISKECTOMY  5/18/2018    Procedure: POSTERIOR LUMBAR LAMINECTOMY 3 - SACRAL 1;  Surgeon: Torsten Blackwell M.D.;  Location: SURGERY [facility];  Service: Neurosurgery   • LUMBAR FUSION POSTERIOR  5/18/2018    Procedure: POSTERIOR LUMBAR 3 - SACRAL 1 ONLAY BONE FUSION WITH ALLOGRAFT;  Surgeon: Torsten Blackwell M.D.;  Location: SURGERY [facility];  Service: Neurosurgery   • GYN SURGERY  2008    hysterectomy   • OTHER ABDOMINAL SURGERY  1997    gallbladder   • ABDOMINAL HYSTERECTOMY TOTAL     • CHOLECYSTECTOMY     • TONSILLECTOMY AND ADENOIDECTOMY         Social History   Substance Use Topics   • Smoking status: Former Smoker     Years: 15.00     Quit date: 5/17/2018   • Smokeless tobacco: Never Used   • Alcohol use No       Social History     Social History Narrative   • No narrative on file       Family History   Problem Relation Age of Onset   • Autoimmune Disease Daughter         MS   • Other Daughter         fibromyalgia         ROS Positive for weight gain, occasional dizziness, unsteady gait, tense muscles.  Patient denies any fever, chills,  fatigue, stroke symptoms, headache, nasal congestion, sore-throat, cough, heartburn, chest pain,  "difficulty breathing, abdominal discomfort, diarrhea/constipation, burning with urination or frequency, joint or back pain, skin rashes, depression or anxiety.       Objective:     /76   Pulse 100   Temp 36.3 °C (97.4 °F) (Temporal)   Resp 14   Ht 1.6 m (5' 3\")   Wt (!) 131 kg (288 lb 12.8 oz)   SpO2 96%  Body mass index is 51.16 kg/m².   Physical Exam:  Constitutional: Alert, no distress.  Skin: Warm, dry, good turgor, no rashes in visible areas. Light brown macules, various sizes, benign appearing.  Psych: Alert and oriented x3, appropriate affect and mood.        Assessment and Plan:   The following treatment plan was discussed    1. Acquired hypothyroidism  Will notify with result.  - CBC WITH DIFFERENTIAL; Future  - Comp Metabolic Panel; Future  - TSH WITH REFLEX TO FT4; Future    2. Spasticity  Controlled on meds.  Narxcheck queried. UDS UTD. Soma due end month.  - diazepam (VALIUM) 10 MG tablet; Take 1 Tab by mouth every 12 hours as needed for Anxiety for up to 90 days.  Dispense: 60 Tab; Refill: 2  - carisoprodol (SOMA) 350 MG Tab; Take 1 Tab by mouth 2 times a day as needed for Muscle Spasms for up to 90 days.  Dispense: 60 Tab; Refill: 2    3. MS (multiple sclerosis) (HCC)  Stable. Sxs controlled on meds.  - diazepam (VALIUM) 10 MG tablet; Take 1 Tab by mouth every 12 hours as needed for Anxiety for up to 90 days.  Dispense: 60 Tab; Refill: 2  - carisoprodol (SOMA) 350 MG Tab; Take 1 Tab by mouth 2 times a day as needed for Muscle Spasms for up to 90 days.  Dispense: 60 Tab; Refill: 2    4. Dyslipidemia  Will notify with result.  - Lipid Profile; Future    5. Breast cancer screening  Will notify with result.  - MA-SCREENING MAMMO BILAT W/TOMOSYNTHESIS W/CAD; Future    6. Screening for colon cancer  Will notify with result.  - OCCULT BLOOD FECES IMMUNOASSAY (FIT); Future      Followup: Return in about 3 months (around 10/11/2019), or if symptoms worsen or fail to improve.    Please note that " this dictation was created using voice recognition software. I have made every reasonable attempt to correct obvious errors, but I expect that there are errors of grammar and possibly content that I did not discover before finalizing the note.

## 2019-07-16 ENCOUNTER — TELEPHONE (OUTPATIENT)
Dept: MEDICAL GROUP | Facility: PHYSICIAN GROUP | Age: 64
End: 2019-07-16

## 2019-07-16 NOTE — TELEPHONE ENCOUNTER
----- Message from Regina Richardson sent at 7/15/2019  6:09 PM PDT -----  Regarding: Prescription Question  Contact: [phone]  Dr Parra  Early in January when I saw  You we had a confidential  Conversation regarding  Witness protection and my     You changed it in your computer  When I saw you last week  For refills on my Valium and Soma I didn't catch it has been   Changed back in the computer   [facility] won't accept the prescription you wrote me  They gave it back  Can you please fix the    TO 10/16[birth year]  I need the prescriptions   Rewritten   I will bring you back the   Ones I have     If you have any questions   Please call me [phone]    Best Regards    Regina Richardson

## 2019-07-17 DIAGNOSIS — R25.2 SPASTICITY: ICD-10-CM

## 2019-07-17 DIAGNOSIS — G35 MS (MULTIPLE SCLEROSIS) (HCC): ICD-10-CM

## 2019-09-24 ENCOUNTER — HOSPITAL ENCOUNTER (OUTPATIENT)
Dept: LAB | Facility: MEDICAL CENTER | Age: 64
End: 2019-09-24
Attending: FAMILY MEDICINE
Payer: MEDICARE

## 2019-09-24 DIAGNOSIS — E78.5 DYSLIPIDEMIA: ICD-10-CM

## 2019-09-24 DIAGNOSIS — E03.9 ACQUIRED HYPOTHYROIDISM: ICD-10-CM

## 2019-09-24 LAB
ALBUMIN SERPL BCP-MCNC: 4.4 G/DL (ref 3.2–4.9)
ALBUMIN/GLOB SERPL: 1.6 G/DL
ALP SERPL-CCNC: 96 U/L (ref 30–99)
ALT SERPL-CCNC: 13 U/L (ref 2–50)
ANION GAP SERPL CALC-SCNC: 8 MMOL/L (ref 0–11.9)
AST SERPL-CCNC: 10 U/L (ref 12–45)
BASOPHILS # BLD AUTO: 0.5 % (ref 0–1.8)
BASOPHILS # BLD: 0.05 K/UL (ref 0–0.12)
BILIRUB SERPL-MCNC: 0.3 MG/DL (ref 0.1–1.5)
BUN SERPL-MCNC: 14 MG/DL (ref 8–22)
CALCIUM SERPL-MCNC: 9.7 MG/DL (ref 8.5–10.5)
CHLORIDE SERPL-SCNC: 105 MMOL/L (ref 96–112)
CHOLEST SERPL-MCNC: 192 MG/DL (ref 100–199)
CO2 SERPL-SCNC: 29 MMOL/L (ref 20–33)
CREAT SERPL-MCNC: 1.02 MG/DL (ref 0.5–1.4)
EOSINOPHIL # BLD AUTO: 0.3 K/UL (ref 0–0.51)
EOSINOPHIL NFR BLD: 3.2 % (ref 0–6.9)
ERYTHROCYTE [DISTWIDTH] IN BLOOD BY AUTOMATED COUNT: 46.9 FL (ref 35.9–50)
GLOBULIN SER CALC-MCNC: 2.7 G/DL (ref 1.9–3.5)
GLUCOSE SERPL-MCNC: 99 MG/DL (ref 65–99)
HCT VFR BLD AUTO: 51.1 % (ref 37–47)
HDLC SERPL-MCNC: 51 MG/DL
HGB BLD-MCNC: 16 G/DL (ref 12–16)
IMM GRANULOCYTES # BLD AUTO: 0.02 K/UL (ref 0–0.11)
IMM GRANULOCYTES NFR BLD AUTO: 0.2 % (ref 0–0.9)
LDLC SERPL CALC-MCNC: 112 MG/DL
LYMPHOCYTES # BLD AUTO: 2.27 K/UL (ref 1–4.8)
LYMPHOCYTES NFR BLD: 23.8 % (ref 22–41)
MCH RBC QN AUTO: 30.5 PG (ref 27–33)
MCHC RBC AUTO-ENTMCNC: 31.3 G/DL (ref 33.6–35)
MCV RBC AUTO: 97.5 FL (ref 81.4–97.8)
MONOCYTES # BLD AUTO: 0.48 K/UL (ref 0–0.85)
MONOCYTES NFR BLD AUTO: 5 % (ref 0–13.4)
NEUTROPHILS # BLD AUTO: 6.4 K/UL (ref 2–7.15)
NEUTROPHILS NFR BLD: 67.3 % (ref 44–72)
NRBC # BLD AUTO: 0 K/UL
NRBC BLD-RTO: 0 /100 WBC
PLATELET # BLD AUTO: 330 K/UL (ref 164–446)
PMV BLD AUTO: 10.1 FL (ref 9–12.9)
POTASSIUM SERPL-SCNC: 4.8 MMOL/L (ref 3.6–5.5)
PROT SERPL-MCNC: 7.1 G/DL (ref 6–8.2)
RBC # BLD AUTO: 5.24 M/UL (ref 4.2–5.4)
SODIUM SERPL-SCNC: 142 MMOL/L (ref 135–145)
T4 FREE SERPL-MCNC: 1 NG/DL (ref 0.53–1.43)
TRIGL SERPL-MCNC: 143 MG/DL (ref 0–149)
TSH SERPL DL<=0.005 MIU/L-ACNC: 7.37 UIU/ML (ref 0.38–5.33)
WBC # BLD AUTO: 9.5 K/UL (ref 4.8–10.8)

## 2019-09-24 PROCEDURE — 80053 COMPREHEN METABOLIC PANEL: CPT

## 2019-09-24 PROCEDURE — 84443 ASSAY THYROID STIM HORMONE: CPT

## 2019-09-24 PROCEDURE — 85025 COMPLETE CBC W/AUTO DIFF WBC: CPT

## 2019-09-24 PROCEDURE — 84439 ASSAY OF FREE THYROXINE: CPT

## 2019-09-24 PROCEDURE — 36415 COLL VENOUS BLD VENIPUNCTURE: CPT

## 2019-09-24 PROCEDURE — 80061 LIPID PANEL: CPT

## 2019-09-25 ENCOUNTER — TELEPHONE (OUTPATIENT)
Dept: MEDICAL GROUP | Facility: PHYSICIAN GROUP | Age: 64
End: 2019-09-25

## 2019-09-25 DIAGNOSIS — E03.9 ACQUIRED HYPOTHYROIDISM: ICD-10-CM

## 2019-09-25 RX ORDER — LEVOTHYROXINE SODIUM 0.2 MG/1
200 TABLET ORAL
Qty: 30 TAB | Refills: 5 | Status: SHIPPED | OUTPATIENT
Start: 2019-09-25 | End: 2020-03-27 | Stop reason: SDUPTHER

## 2019-09-25 NOTE — TELEPHONE ENCOUNTER
Telcom with patient. TSH 7. On 150 mcg synthroid/day. Agreeable to increase to 200 mcg. Recheck TSH 8-12 weeks.

## 2019-11-12 ENCOUNTER — OFFICE VISIT (OUTPATIENT)
Dept: MEDICAL GROUP | Facility: PHYSICIAN GROUP | Age: 64
End: 2019-11-12
Payer: MEDICARE

## 2019-11-12 ENCOUNTER — HOSPITAL ENCOUNTER (OUTPATIENT)
Dept: LAB | Facility: MEDICAL CENTER | Age: 64
End: 2019-11-12
Attending: FAMILY MEDICINE
Payer: MEDICARE

## 2019-11-12 VITALS
DIASTOLIC BLOOD PRESSURE: 82 MMHG | WEIGHT: 293 LBS | HEART RATE: 83 BPM | HEIGHT: 63 IN | TEMPERATURE: 97.7 F | OXYGEN SATURATION: 95 % | SYSTOLIC BLOOD PRESSURE: 120 MMHG | BODY MASS INDEX: 51.91 KG/M2

## 2019-11-12 DIAGNOSIS — E03.9 ACQUIRED HYPOTHYROIDISM: ICD-10-CM

## 2019-11-12 DIAGNOSIS — Z23 NEED FOR VACCINATION: ICD-10-CM

## 2019-11-12 DIAGNOSIS — G35 MS (MULTIPLE SCLEROSIS) (HCC): ICD-10-CM

## 2019-11-12 LAB — TSH SERPL DL<=0.005 MIU/L-ACNC: 0.23 UIU/ML (ref 0.38–5.33)

## 2019-11-12 PROCEDURE — 99213 OFFICE O/P EST LOW 20 MIN: CPT | Mod: 25 | Performed by: FAMILY MEDICINE

## 2019-11-12 PROCEDURE — 84443 ASSAY THYROID STIM HORMONE: CPT

## 2019-11-12 PROCEDURE — 36415 COLL VENOUS BLD VENIPUNCTURE: CPT

## 2019-11-12 PROCEDURE — G0008 ADMIN INFLUENZA VIRUS VAC: HCPCS | Performed by: FAMILY MEDICINE

## 2019-11-12 PROCEDURE — 90686 IIV4 VACC NO PRSV 0.5 ML IM: CPT | Performed by: FAMILY MEDICINE

## 2019-11-12 RX ORDER — DIAZEPAM 10 MG/1
10 TABLET ORAL EVERY 6 HOURS PRN
COMMUNITY
End: 2019-11-12 | Stop reason: SDUPTHER

## 2019-11-12 RX ORDER — DIAZEPAM 10 MG/1
10 TABLET ORAL EVERY 12 HOURS PRN
Qty: 60 TAB | Refills: 2 | Status: SHIPPED | OUTPATIENT
Start: 2019-11-12 | End: 2020-02-10

## 2019-11-12 RX ORDER — CARISOPRODOL 350 MG/1
TABLET ORAL
Refills: 2 | COMMUNITY
Start: 2019-10-01 | End: 2019-11-12 | Stop reason: SDUPTHER

## 2019-11-12 RX ORDER — CARISOPRODOL 350 MG/1
TABLET ORAL
Qty: 60 TAB | Refills: 2 | Status: SHIPPED | OUTPATIENT
Start: 2019-11-12 | End: 2020-02-12

## 2019-11-12 NOTE — ASSESSMENT & PLAN NOTE
Has not seen neurologist since last year. Needs to establish with local one. Balance getting worse, states using cane makes her more unsteady.

## 2019-11-12 NOTE — PROGRESS NOTES
Complaint: Refill meds, worsening MS sxs.     Subjective:     Regina Richardson is a 64 y.o. female here today for f/u.    Acquired hypothyroidism  Increased Synthroid end Sept to 150 mcg/day, due for recheck TSH.    Spasticity  Now getting spasms in chest wall. Needs refill Valium and Soma.    Neurogenic bladder  Getting worse.    MS (multiple sclerosis) ([facility])  Has not seen neurologist since last year. Needs to establish with local one. Balance getting worse, states using cane makes her more unsteady.     No other concerns or complaints today.    Current medicines (including changes today)  Current Outpatient Medications   Medication Sig Dispense Refill   • carisoprodol (SOMA) 350 MG Tab TAKE 1 TABLET BY MOUTH TWICE DAILY AS NEEDED FOR MUSCLE SPASMS R25.2 60 Tab 2   • diazepam (VALIUM) 10 MG tablet Take 1 Tab by mouth every 12 hours as needed (Muscle spasms) for up to 90 days. 60 Tab 2   • levothyroxine (SYNTHROID) 200 MCG Tab Take 1 Tab by mouth Every morning on an empty stomach. 30 Tab 5   • diphenhydrAMINE (BENADRYL) 25 MG Tab Take 25 mg by mouth at bedtime as needed for Sleep.     • Cholecalciferol (VITAMIN D PO) Take 3,000 Units by mouth every day.     • levothyroxine (SYNTHROID) 150 MCG Tab Take 150 mcg by mouth Every morning on an empty stomach.       No current facility-administered medications for this visit.      She  has a past medical history of Anesthesia (05/02/2018), Graves disease (05/02/2018), Gynecological disorder, Head ache, Incontinence of urine in female, Lumbar spinal stenosis, Multiple sclerosis (HCC), Muscle disorder, Ocular migraine, Pneumonia (2016), and Urinary incontinence.    Health Maintenance:      Allergies: Bee venom; Other misc; Sulfa drugs; and Tape    Current Outpatient Medications Ordered in Epic   Medication Sig Dispense Refill   • carisoprodol (SOMA) 350 MG Tab TAKE 1 TABLET BY MOUTH TWICE DAILY AS NEEDED FOR MUSCLE SPASMS R25.2 60 Tab 2   • diazepam (VALIUM) 10 MG tablet  Take 1 Tab by mouth every 12 hours as needed (Muscle spasms) for up to 90 days. 60 Tab 2   • levothyroxine (SYNTHROID) 200 MCG Tab Take 1 Tab by mouth Every morning on an empty stomach. 30 Tab 5   • diphenhydrAMINE (BENADRYL) 25 MG Tab Take 25 mg by mouth at bedtime as needed for Sleep.     • Cholecalciferol (VITAMIN D PO) Take 3,000 Units by mouth every day.     • levothyroxine (SYNTHROID) 150 MCG Tab Take 150 mcg by mouth Every morning on an empty stomach.       No current [facility]-ordered facility-administered medications on file.        Past Medical History:   Diagnosis Date   • Anesthesia 2018    extreme nausea   • Graves disease 2018   • Gynecological disorder    • Head ache    • Incontinence of urine in female     pt states she has urinary incontinence   • Lumbar spinal stenosis    • Multiple sclerosis (HCC)    • Muscle disorder    • Ocular migraine    • Pneumonia    • Urinary incontinence        Past Surgical History:   Procedure Laterality Date   • LUMBAR LAMINECTOMY DISKECTOMY  2018    Procedure: POSTERIOR LUMBAR LAMINECTOMY 3 - SACRAL 1;  Surgeon: Torsten Blackwell M.D.;  Location: SURGERY [facility];  Service: Neurosurgery   • LUMBAR FUSION POSTERIOR  2018    Procedure: POSTERIOR LUMBAR 3 - SACRAL 1 ONLAY BONE FUSION WITH ALLOGRAFT;  Surgeon: Torsten Blackwell M.D.;  Location: [facility];  Service: Neurosurgery   • GYN SURGERY      hysterectomy   • OTHER ABDOMINAL SURGERY      gallbladder   • ABDOMINAL HYSTERECTOMY TOTAL     • CHOLECYSTECTOMY     • TONSILLECTOMY AND ADENOIDECTOMY         Social History     Tobacco Use   • Smoking status: Former Smoker     Packs/day: 0.00     Years: 15.00     Pack years: 0.00     Last attempt to quit: 2018     Years since quittin.4   • Smokeless tobacco: Never Used   Substance Use Topics   • Alcohol use: No   • Drug use: No       Social History     Patient does not qualify to have social determinant information on file  "(likely too young).   Social History Narrative   • Not on file       Family History   Problem Relation Age of Onset   • Autoimmune Disease Daughter         MS   • Other Daughter         fibromyalgia         ROS Positive for trouble emptying her bladder, loss of balance, muscle spasms.  Patient denies any fever, chills, unintentional weight gain/loss, fatigue, stroke symptoms, dizziness, headache, nasal congestion, sore-throat, cough, heartburn, chest pain, difficulty breathing, abdominal discomfort, diarrhea/constipation, burning with urination or frequency, joint or back pain, skin rashes, depression or anxiety.       Objective:     /82 (BP Location: Right arm, Patient Position: Sitting, BP Cuff Size: Large adult)   Pulse 83   Temp 36.5 °C (97.7 °F) (Temporal)   Ht 1.6 m (5' 3\")   Wt (!) 135 kg (297 lb 9.9 oz)   SpO2 95%  Body mass index is 52.72 kg/m².   Physical Exam:  Constitutional: Alert, no distress.    Psych: Alert and oriented x3, appropriate affect and mood.        Assessment and Plan:   The following treatment plan was discussed    1. MS (multiple sclerosis) ([facility])    - REFERRAL TO NEUROLOGY - Dr. Ribeiro at [facility].  - carisoprodol (SOMA) 350 MG Tab; TAKE 1 TABLET BY MOUTH TWICE DAILY AS NEEDED FOR MUSCLE SPASMS R25.2  Dispense: 60 Tab; Refill: 2  - diazepam (VALIUM) 10 MG tablet; Take 1 Tab by mouth every 12 hours as needed (Muscle spasms) for up to 90 days.  Dispense: 60 Tab; Refill: 2    2. Need for vaccination  - Influenza Vaccine Quad Injection (PF)    3. Acquired hypothyroidism  Will notify with result. Titrate to keep TSh between 1 and 2.  - TSH; Future      Followup: Return in about 3 months (around 2/12/2020), or if symptoms worsen or fail to improve.    Please note that this dictation was created using voice recognition software. I have made every reasonable attempt to correct obvious errors, but I expect that there are errors of grammar and possibly content that I did not discover " before finalizing the note.

## 2020-02-13 ENCOUNTER — OFFICE VISIT (OUTPATIENT)
Dept: MEDICAL GROUP | Facility: PHYSICIAN GROUP | Age: 65
End: 2020-02-13
Payer: MEDICARE

## 2020-02-13 VITALS
WEIGHT: 286.6 LBS | OXYGEN SATURATION: 94 % | SYSTOLIC BLOOD PRESSURE: 114 MMHG | TEMPERATURE: 97.4 F | RESPIRATION RATE: 16 BRPM | HEIGHT: 63 IN | HEART RATE: 82 BPM | BODY MASS INDEX: 50.78 KG/M2 | DIASTOLIC BLOOD PRESSURE: 74 MMHG

## 2020-02-13 DIAGNOSIS — G35 MS (MULTIPLE SCLEROSIS) (HCC): ICD-10-CM

## 2020-02-13 DIAGNOSIS — Z12.31 ENCOUNTER FOR SCREENING MAMMOGRAM FOR BREAST CANCER: ICD-10-CM

## 2020-02-13 DIAGNOSIS — E66.01 CLASS 3 SEVERE OBESITY DUE TO EXCESS CALORIES WITHOUT SERIOUS COMORBIDITY WITH BODY MASS INDEX (BMI) OF 50.0 TO 59.9 IN ADULT (HCC): ICD-10-CM

## 2020-02-13 DIAGNOSIS — E03.9 ACQUIRED HYPOTHYROIDISM: ICD-10-CM

## 2020-02-13 PROBLEM — G47.34 NOCTURNAL HYPOXIA: Status: ACTIVE | Noted: 2018-05-21

## 2020-02-13 PROBLEM — E66.813 CLASS 3 SEVERE OBESITY WITHOUT SERIOUS COMORBIDITY IN ADULT (HCC): Status: ACTIVE | Noted: 2020-02-13

## 2020-02-13 PROCEDURE — 99214 OFFICE O/P EST MOD 30 MIN: CPT | Performed by: FAMILY MEDICINE

## 2020-02-13 RX ORDER — CARISOPRODOL 350 MG/1
350 TABLET ORAL 4 TIMES DAILY
COMMUNITY
End: 2020-02-13 | Stop reason: SDUPTHER

## 2020-02-13 RX ORDER — DIAZEPAM 10 MG/1
10 TABLET ORAL EVERY 6 HOURS PRN
COMMUNITY
End: 2020-02-13 | Stop reason: SDUPTHER

## 2020-02-13 RX ORDER — DIAZEPAM 10 MG/1
10 TABLET ORAL EVERY 12 HOURS PRN
Qty: 60 TAB | Refills: 2 | Status: SHIPPED | OUTPATIENT
Start: 2020-02-13 | End: 2020-05-12 | Stop reason: SDUPTHER

## 2020-02-13 RX ORDER — CARISOPRODOL 350 MG/1
350 TABLET ORAL 2 TIMES DAILY
Qty: 60 TAB | Refills: 2 | Status: SHIPPED | OUTPATIENT
Start: 2020-02-13 | End: 2020-05-12 | Stop reason: SDUPTHER

## 2020-02-13 ASSESSMENT — PATIENT HEALTH QUESTIONNAIRE - PHQ9
CLINICAL INTERPRETATION OF PHQ2 SCORE: 2
SUM OF ALL RESPONSES TO PHQ QUESTIONS 1-9: 2
5. POOR APPETITE OR OVEREATING: 0 - NOT AT ALL

## 2020-02-13 NOTE — PROGRESS NOTES
Complaint: Refills     Subjective:     Regina Richardson is a 64 y.o. female here today for f/u.    MS (multiple sclerosis) (CMS-[facility])  Stable, no falls. Not getting any regular exercise. Needs refills Soma and Valium.    Acquired hypothyroidism  Latest TSH 0.23, feeling good on this dose (Synthroid 200 mcg/day).     No other concerns or complaints today.    Current medicines (including changes today)  Current Outpatient Medications   Medication Sig Dispense Refill   • Home Care Oxygen Inhale 2 L/min by mouth every bedtime.     • diazepam (VALIUM) 10 MG tablet Take 1 Tab by mouth every 12 hours as needed for up to 90 days. 60 Tab 2   • carisoprodol (SOMA) 350 MG Tab Take 1 Tab by mouth 2 Times a Day for 90 days. 60 Tab 2   • levothyroxine (SYNTHROID) 200 MCG Tab Take 1 Tab by mouth Every morning on an empty stomach. 30 Tab 5   • diphenhydrAMINE (BENADRYL) 25 MG Tab Take 25 mg by mouth at bedtime as needed for Sleep.     • Cholecalciferol (VITAMIN D PO) Take 3,000 Units by mouth every day.     • levothyroxine (SYNTHROID) 150 MCG Tab Take 150 mcg by mouth Every morning on an empty stomach.       No current facility-administered medications for this visit.      She  has a past medical history of Anesthesia (05/02/2018), Graves disease (05/02/2018), Gynecological disorder, Head ache, Incontinence of urine in female, Lumbar spinal stenosis, Multiple sclerosis ([facility]), Muscle disorder, Ocular migraine, Pneumonia (2016), and Urinary incontinence.    Health Maintenance: needs mammogram, had FIT this past year.      Allergies: Bee venom; Other misc; Sulfa drugs; and Tape    Current Outpatient Medications Ordered in Epic   Medication Sig Dispense Refill   • Home Care Oxygen Inhale 2 L/min by mouth every bedtime.     • diazepam (VALIUM) 10 MG tablet Take 1 Tab by mouth every 12 hours as needed for up to 90 days. 60 Tab 2   • carisoprodol (SOMA) 350 MG Tab Take 1 Tab by mouth 2 Times a Day for 90 days. 60 Tab 2   • levothyroxine  (SYNTHROID) 200 MCG Tab Take 1 Tab by mouth Every morning on an empty stomach. 30 Tab 5   • diphenhydrAMINE (BENADRYL) 25 MG Tab Take 25 mg by mouth at bedtime as needed for Sleep.     • Cholecalciferol (VITAMIN D PO) Take 3,000 Units by mouth every day.     • levothyroxine (SYNTHROID) 150 MCG Tab Take 150 mcg by mouth Every morning on an empty stomach.       No current [facility]-ordered facility-administered medications on file.        Past Medical History:   Diagnosis Date   • Anesthesia 2018    extreme nausea   • Graves disease 2018   • Gynecological disorder    • Head ache    • Incontinence of urine in female     pt states she has urinary incontinence   • Lumbar spinal stenosis    • Multiple sclerosis (HCC)    • Muscle disorder    • Ocular migraine    • Pneumonia    • Urinary incontinence        Past Surgical History:   Procedure Laterality Date   • LUMBAR LAMINECTOMY DISKECTOMY  2018    Procedure: POSTERIOR LUMBAR LAMINECTOMY 3 - SACRAL 1;  Surgeon: Torsten Blackwell M.D.;  Location: SURGERY [facility];  Service: Neurosurgery   • LUMBAR FUSION POSTERIOR  2018    Procedure: POSTERIOR LUMBAR 3 - SACRAL 1 ONLAY BONE FUSION WITH ALLOGRAFT;  Surgeon: Torsten Blackwell M.D.;  Location: SURGERY [facility];  Service: Neurosurgery   • GYN SURGERY      hysterectomy   • OTHER ABDOMINAL SURGERY      gallbladder   • ABDOMINAL HYSTERECTOMY TOTAL     • CHOLECYSTECTOMY     • TONSILLECTOMY AND ADENOIDECTOMY         Social History     Tobacco Use   • Smoking status: Former Smoker     Packs/day: 0.00     Years: 15.00     Pack years: 0.00     Last attempt to quit: 2018     Years since quittin.7   • Smokeless tobacco: Never Used   Substance Use Topics   • Alcohol use: No   • Drug use: No       Social History     Social History Narrative   • Not on file       Family History   Problem Relation Age of Onset   • Autoimmune Disease Daughter         MS   • Other Daughter         fibromyalgia  "        ROS Positive for muscle spasms, unsteady balance.  Patient denies any fever, chills, unintentional weight gain/loss, fatigue, stroke symptoms, dizziness, headache, nasal congestion, sore-throat, cough, heartburn, chest pain, difficulty breathing, abdominal discomfort, diarrhea/constipation, burning with urination or frequency, joint or back pain, skin rashes, depression or anxiety.       Objective:     /74 (BP Location: Left arm, Patient Position: Sitting)   Pulse 82   Temp 36.3 °C (97.4 °F)   Resp 16   Ht 1.6 m (5' 3\")   Wt (!) 130 kg (286 lb 9.6 oz)   SpO2 94%  Body mass index is 50.77 kg/m².   Physical Exam:  Constitutional: Alert, no distress.  No formal exam  Psych: Alert and oriented x3, appropriate affect and mood.        Assessment and Plan:   The following treatment plan was discussed    1. Class 3 severe obesity due to excess calories without serious comorbidity with body mass index (BMI) of 50.0 to 59.9 in adult ([facility])  - Patient identified as having weight management issue.  Appropriate orders and counseling given.    2. MS (multiple sclerosis) ([facility])  Recommend aquatic exercise at local pool.  - diazepam (VALIUM) 10 MG tablet; Take 1 Tab by mouth every 12 hours as needed for up to 90 days.  Dispense: 60 Tab; Refill: 2  - carisoprodol (SOMA) 350 MG Tab; Take 1 Tab by mouth 2 Times a Day for 90 days.  Dispense: 60 Tab; Refill: 2    3. Encounter for screening mammogram for breast cancer  Will notify with result.  - MA-SCREENING MAMMO BILAT W/TOMOSYNTHESIS W/CAD; Future    4. Acquired hypothyroidism  Doing well on current dose. Recheck TSH 1 year.    Will refill meds at 3 months.    Followup: Return in about 6 months (around 8/13/2020), or if symptoms worsen or fail to improve.    Please note that this dictation was created using voice recognition software. I have made every reasonable attempt to correct obvious errors, but I expect that there are errors of grammar and possibly content " that I did not discover before finalizing the note.

## 2020-03-02 DIAGNOSIS — E03.9 ACQUIRED HYPOTHYROIDISM: ICD-10-CM

## 2020-03-16 RX ORDER — AZITHROMYCIN 250 MG/1
TABLET, FILM COATED ORAL
Qty: 6 TAB | Refills: 0 | Status: SHIPPED | OUTPATIENT
Start: 2020-03-16 | End: 2020-03-21

## 2020-05-05 RX ORDER — TIZANIDINE 4 MG/1
4 TABLET ORAL DAILY
COMMUNITY
End: 2020-05-05 | Stop reason: SDUPTHER

## 2020-05-05 RX ORDER — TIZANIDINE 4 MG/1
4 TABLET ORAL DAILY
Qty: 60 TAB | Refills: 0 | Status: SHIPPED | OUTPATIENT
Start: 2020-05-05 | End: 2020-08-12

## 2020-07-25 ENCOUNTER — OFFICE VISIT (OUTPATIENT)
Dept: URGENT CARE | Facility: CLINIC | Age: 65
End: 2020-07-25
Payer: MEDICARE

## 2020-07-25 ENCOUNTER — HOSPITAL ENCOUNTER (OUTPATIENT)
Facility: MEDICAL CENTER | Age: 65
End: 2020-07-25
Attending: PHYSICIAN ASSISTANT
Payer: MEDICARE

## 2020-07-25 VITALS
WEIGHT: 284 LBS | BODY MASS INDEX: 50.32 KG/M2 | TEMPERATURE: 99 F | HEIGHT: 63 IN | SYSTOLIC BLOOD PRESSURE: 118 MMHG | OXYGEN SATURATION: 95 % | HEART RATE: 105 BPM | RESPIRATION RATE: 16 BRPM | DIASTOLIC BLOOD PRESSURE: 74 MMHG

## 2020-07-25 DIAGNOSIS — B34.9 VIRAL ILLNESS: ICD-10-CM

## 2020-07-25 LAB
FLUAV+FLUBV AG SPEC QL IA: NEGATIVE
INT CON NEG: NORMAL
INT CON NEG: NORMAL
INT CON POS: NORMAL
INT CON POS: NORMAL
S PYO AG THROAT QL: NEGATIVE

## 2020-07-25 PROCEDURE — 87880 STREP A ASSAY W/OPTIC: CPT | Performed by: PHYSICIAN ASSISTANT

## 2020-07-25 PROCEDURE — 87804 INFLUENZA ASSAY W/OPTIC: CPT | Performed by: PHYSICIAN ASSISTANT

## 2020-07-25 PROCEDURE — U0003 INFECTIOUS AGENT DETECTION BY NUCLEIC ACID (DNA OR RNA); SEVERE ACUTE RESPIRATORY SYNDROME CORONAVIRUS 2 (SARS-COV-2) (CORONAVIRUS DISEASE [COVID-19]), AMPLIFIED PROBE TECHNIQUE, MAKING USE OF HIGH THROUGHPUT TECHNOLOGIES AS DESCRIBED BY CMS-2020-01-R: HCPCS

## 2020-07-25 PROCEDURE — 99214 OFFICE O/P EST MOD 30 MIN: CPT | Performed by: PHYSICIAN ASSISTANT

## 2020-07-25 ASSESSMENT — ENCOUNTER SYMPTOMS
SHORTNESS OF BREATH: 0
CHILLS: 1
ABDOMINAL PAIN: 0
TINGLING: 0
PALPITATIONS: 0
WEAKNESS: 0
HEADACHES: 1
FLANK PAIN: 0
VOMITING: 0
DIZZINESS: 1
FEVER: 1
COUGH: 1
WHEEZING: 0
MYALGIAS: 1
SENSORY CHANGE: 0
FOCAL WEAKNESS: 0
HEMOPTYSIS: 0
SPUTUM PRODUCTION: 0
LOSS OF CONSCIOUSNESS: 0
NAUSEA: 0
SORE THROAT: 1
DIARRHEA: 0

## 2020-07-25 ASSESSMENT — FIBROSIS 4 INDEX: FIB4 SCORE: 0.52

## 2020-07-25 NOTE — PATIENT INSTRUCTIONS
A concern for COVID-19 has been identified and testing is in progress.     We are asking you to excuse absences while they follow self-isolation protocol per CDC guidelines. They will be able to access their results through our electronic delivery system called [employer].     If the results of testing are negative then they may return to work once there has been no fever greater than 100.4 F for at least 72 hours without treatment and no vomiting or diarrhea for at least 48 hours.     If the results of testing are positive then they will be contacted by the [facility] for further instructions on duration of self-isolation and return to work protocol. In general this will also follow the CDC guidelines with a minimum of 10 days from the onset of symptoms and improving without fever, vomiting, or diarrhea as above.     In most cases repeat testing is not necessary and not offered through our urgent care.     This is the only note that will be provided from [facility] for this visit. Please schedule a visit with primary care if documents for FMLA, disability, or unemployment are required.

## 2020-07-25 NOTE — PROGRESS NOTES
Subjective:      Regina Richardson is a 64 y.o. female who presents with Fever (headaches, chill, sore throat, high fever, passed out 3 days ago, has multiple sclerosis.)            Fever    This is a new problem. Episode onset: 3-4 days  The maximum temperature noted was more than 104 F. Associated symptoms include coughing (non-productive ), headaches, muscle aches, sleepiness and a sore throat. Pertinent negatives include no abdominal pain, chest pain, congestion, diarrhea, ear pain, nausea, rash, urinary pain, vomiting or wheezing. Associated symptoms comments: No chest pain or shortness of breath. No difficulty breathing . She has tried acetaminophen and NSAIDs for the symptoms. The treatment provided moderate relief.   Risk factors: immunosuppression    Risk factors: no recent sickness, no recent travel and no sick contacts        The patient denies LOC. She states she has vertigo and became dizzy with onset of fever and fell. No head injury,       Past Medical History:   Diagnosis Date   • Anesthesia 05/02/2018    extreme nausea   • Graves disease 05/02/2018   • Gynecological disorder    • Head ache    • Incontinence of urine in female     pt states she has urinary incontinence   • Lumbar spinal stenosis    • Multiple sclerosis (HCC)    • Muscle disorder    • Ocular migraine    • Pneumonia 2016   • Urinary incontinence        Past Surgical History:   Procedure Laterality Date   • LUMBAR LAMINECTOMY DISKECTOMY  5/18/2018    Procedure: POSTERIOR LUMBAR LAMINECTOMY 3 - SACRAL 1;  Surgeon: Torsten Blackwell M.D.;  Location: [facility];  Service: Neurosurgery   • LUMBAR FUSION POSTERIOR  5/18/2018    Procedure: POSTERIOR LUMBAR 3 - SACRAL 1 ONLAY BONE FUSION WITH ALLOGRAFT;  Surgeon: Torsten Blackwell M.D.;  Location: [facility];  Service: Neurosurgery   • GYN SURGERY  2008    hysterectomy   • OTHER ABDOMINAL SURGERY  1997    gallbladder   • ABDOMINAL HYSTERECTOMY TOTAL     • CHOLECYSTECTOMY    "  • TONSILLECTOMY AND ADENOIDECTOMY         Family History   Problem Relation Age of Onset   • Autoimmune Disease Daughter         MS   • Other Daughter         fibromyalgia       Allergies   Allergen Reactions   • Bee Venom Anaphylaxis     Has epi pen   • Other Misc Swelling     Surgical steel   • Sulfa Drugs      As child; unknown rxn   • Tape Hives     Adhesive tape allergy       Medications, Allergies, and current problem list reviewed today in Epic    Review of Systems   Constitutional: Positive for chills, fever and malaise/fatigue.   HENT: Positive for sore throat. Negative for congestion, ear discharge and ear pain.    Respiratory: Positive for cough (non-productive ). Negative for hemoptysis, sputum production, shortness of breath and wheezing.    Cardiovascular: Negative for chest pain, palpitations and leg swelling.   Gastrointestinal: Negative for abdominal pain, diarrhea, nausea and vomiting.   Genitourinary: Negative for dysuria, flank pain, frequency, hematuria and urgency.   Musculoskeletal: Positive for joint pain and myalgias.   Skin: Negative for rash.   Neurological: Positive for dizziness and headaches. Negative for tingling, sensory change, focal weakness, loss of consciousness and weakness.     All other systems reviewed and are negative.        Objective:     /74   Pulse (!) 105   Temp 37.2 °C (99 °F)   Resp 16   Ht 1.6 m (5' 3\")   Wt (!) 128.8 kg (284 lb)   SpO2 95%   BMI 50.31 kg/m²      Physical Exam  Constitutional:       General: She is not in acute distress.  HENT:      Head: Normocephalic and atraumatic.      Nose: Nose normal.      Mouth/Throat:      Mouth: Mucous membranes are moist.      Pharynx: No oropharyngeal exudate or posterior oropharyngeal erythema.   Eyes:      Extraocular Movements: Extraocular movements intact.      Conjunctiva/sclera: Conjunctivae normal.      Pupils: Pupils are equal, round, and reactive to light.   Cardiovascular:      Rate and Rhythm: " Normal rate and regular rhythm.      Heart sounds: Normal heart sounds.   Pulmonary:      Effort: Pulmonary effort is normal. No respiratory distress.      Breath sounds: Normal breath sounds. No wheezing, rhonchi or rales.   Musculoskeletal: Normal range of motion.   Lymphadenopathy:      Cervical: No cervical adenopathy.   Skin:     General: Skin is warm and dry.      Findings: No rash.   Neurological:      General: No focal deficit present.      Mental Status: She is alert and oriented to person, place, and time.   Psychiatric:         Mood and Affect: Mood normal.         Behavior: Behavior normal.         Thought Content: Thought content normal.         Judgment: Judgment normal.                 Assessment/Plan:       1. Viral illness    Viral Respiratory illness.     - POCT Rapid Strep A- negative   - POCT Influenza A/B- negative   - COVID/SARS COV-2 PCR; Future      Per protocol for PUI/ISO patients, the patient was evaluated by me while I was wearing PPE.  Per CDC guidelines, patient has been instructed to self quarantine at home for at least 10 days from onset of symptoms and at least 3 full days after resolution of fever/respiratory symptoms.  PT verbalized agreement to do so.      Advised ED if any worsening symptoms including inability to control fevers, chest pain, shortness of breath, neurological symptoms, or difficulty breathing.    Differential diagnoses, Supportive care, and indications for immediate follow-up discussed with patient.   Pathogenesis of diagnosis discussed including typical length and natural progression.   Instructed to return to clinic or nearest emergency department for any change in condition, further concerns, or worsening of symptoms.    The patient demonstrated a good understanding and agreed with the treatment plan.    Trinity Huynh P.A.-C.

## 2020-07-27 ENCOUNTER — TELEPHONE (OUTPATIENT)
Dept: URGENT CARE | Facility: CLINIC | Age: 65
End: 2020-07-27

## 2020-07-27 DIAGNOSIS — B34.9 VIRAL ILLNESS: ICD-10-CM

## 2020-07-27 LAB — COVID ORDER STATUS COVID19: NORMAL

## 2020-07-27 NOTE — TELEPHONE ENCOUNTER
I did not get a notification stating this COVID test has been received. Can we call the lab and figure out the status of this test and whether or not the patient needs to come back in to get retested?    Trinity Huynh P.A.-C.

## 2020-07-28 LAB
SARS-COV-2 RNA RESP QL NAA+PROBE: NOTDETECTED
SPECIMEN SOURCE: NORMAL

## 2020-07-29 ENCOUNTER — TELEPHONE (OUTPATIENT)
Dept: URGENT CARE | Facility: CLINIC | Age: 65
End: 2020-07-29

## 2020-07-29 NOTE — TELEPHONE ENCOUNTER
Can you call the patient and let her know her test is negative. I released her test results and sent her a message in [employer].     Trinity Huynh P.A.-C.

## 2020-08-12 ENCOUNTER — TELEMEDICINE (OUTPATIENT)
Dept: MEDICAL GROUP | Facility: PHYSICIAN GROUP | Age: 65
End: 2020-08-12
Payer: MEDICARE

## 2020-08-12 VITALS
SYSTOLIC BLOOD PRESSURE: 111 MMHG | DIASTOLIC BLOOD PRESSURE: 74 MMHG | HEART RATE: 72 BPM | OXYGEN SATURATION: 93 % | WEIGHT: 268 LBS | BODY MASS INDEX: 47.48 KG/M2 | HEIGHT: 63 IN

## 2020-08-12 DIAGNOSIS — E66.01 CLASS 3 SEVERE OBESITY DUE TO EXCESS CALORIES WITHOUT SERIOUS COMORBIDITY WITH BODY MASS INDEX (BMI) OF 45.0 TO 49.9 IN ADULT (HCC): ICD-10-CM

## 2020-08-12 DIAGNOSIS — E78.5 DYSLIPIDEMIA: ICD-10-CM

## 2020-08-12 DIAGNOSIS — G35 MULTIPLE SCLEROSIS (HCC): ICD-10-CM

## 2020-08-12 DIAGNOSIS — E03.9 ACQUIRED HYPOTHYROIDISM: ICD-10-CM

## 2020-08-12 PROCEDURE — 99214 OFFICE O/P EST MOD 30 MIN: CPT | Performed by: PHYSICIAN ASSISTANT

## 2020-08-12 RX ORDER — DIAZEPAM 10 MG/1
10 TABLET ORAL 2 TIMES DAILY
Qty: 60 TAB | Refills: 2 | Status: SHIPPED | OUTPATIENT
Start: 2020-08-12 | End: 2020-09-15 | Stop reason: SDUPTHER

## 2020-08-12 RX ORDER — DIAZEPAM 10 MG/1
10 TABLET ORAL 2 TIMES DAILY
COMMUNITY
End: 2020-08-12 | Stop reason: SDUPTHER

## 2020-08-12 RX ORDER — CARISOPRODOL 350 MG/1
350 TABLET ORAL 2 TIMES DAILY
Qty: 60 TAB | Refills: 2 | Status: SHIPPED | OUTPATIENT
Start: 2020-08-12 | End: 2020-09-15 | Stop reason: SDUPTHER

## 2020-08-12 RX ORDER — CARISOPRODOL 350 MG/1
350 TABLET ORAL 2 TIMES DAILY
COMMUNITY
End: 2020-08-12 | Stop reason: SDUPTHER

## 2020-08-12 RX ORDER — LEVOTHYROXINE SODIUM 175 UG/1
175 TABLET ORAL
Qty: 90 TAB | Refills: 0 | Status: SHIPPED | OUTPATIENT
Start: 2020-08-12 | End: 2020-11-09

## 2020-08-12 ASSESSMENT — FIBROSIS 4 INDEX: FIB4 SCORE: 0.52

## 2020-08-12 NOTE — PROGRESS NOTES
Telemedicine Visit: Established Patient     This encounter was conducted via Zoom.   Verbal consent was obtained. Patient's identity was verified.    CC:  Chief Complaint   Patient presents with   • Establish Care       HISTORY OF PRESENT ILLNESS: Patient is a 64 y.o. female established patient presenting to establish PCP.   1. Pt having body aches and fever. Recently tested negative for Covid.   2. Pt has MS for many years. She was referred to neurologist recently. Refused to get MRI done 2/2 COVID. Also refused MS medications because of immunoomprimose. Last MRI in 2017. Dr Gabriel prescribed Soma and Valium for her MS. She alternates between soma and valium depending on the day. Soma works very well for muscle spasms and Valium works better for numbness.   3. Pt had back surgery in 2018.   4. Pt on levothyroxine 200mcg. Her last TSH was .23.       No problem-specific Assessment & Plan notes found for this encounter.      Patient Active Problem List    Diagnosis Date Noted   • Nocturnal hypoxia 05/21/2018     Priority: High   • Class 3 severe obesity without serious comorbidity in adult ([facility]) 02/13/2020   • Acquired hypothyroidism 05/29/2018   • Seasonal allergic rhinitis due to pollen 05/29/2018   • Gait instability 09/25/2017   • Neurogenic bladder 09/25/2017   • Spasticity 09/25/2017   • MS (multiple sclerosis) ([facility]) 07/06/2017   • Dyslipidemia 07/06/2017   • Vitamin D deficiency 07/06/2017      Allergies:Bee venom, Other misc, Sulfa drugs, and Tape    Current Outpatient Medications   Medication Sig Dispense Refill   • diazepam (VALIUM) 10 MG tablet Take 10 mg by mouth 2 Times a Day.     • carisoprodol (SOMA) 350 MG Tab Take 350 mg by mouth 2 Times a Day.     • levothyroxine (SYNTHROID) 200 MCG Tab Take 1 Tab by mouth Every morning on an empty stomach. 30 Tab 5   • Home Care Oxygen Inhale 2 L/min by mouth every bedtime.     • Cholecalciferol (VITAMIN D PO) Take 3,000 Units by mouth every day.     •  tizanidine (ZANAFLEX) 4 MG Tab Take 1 Tab by mouth every day. 1 tablet three times daily as needed 60 Tab 0   • diphenhydrAMINE (BENADRYL) 25 MG Tab Take 25 mg by mouth at bedtime as needed for Sleep.     • levothyroxine (SYNTHROID) 150 MCG Tab Take 150 mcg by mouth Every morning on an empty stomach.       No current facility-administered medications for this visit.        Social History     Tobacco Use   • Smoking status: Former Smoker     Packs/day: 0.00     Years: 15.00     Pack years: 0.00     Quit date: 2018     Years since quittin.2   • Smokeless tobacco: Never Used   Substance Use Topics   • Alcohol use: No   • Drug use: No     Social History     Social History Narrative   • Not on file       Family History   Problem Relation Age of Onset   • Autoimmune Disease Daughter         MS   • Other Daughter         fibromyalgia        ROS:     - Constitutional:  Negative for fever, chills, unexpected weight change, and fatigue/generalized weakness.    - HEENT:  Negative for headaches, vision changes, hearing changes, ear pain, ear discharge, rhinorrhea, sinus congestion, sore throat, and neck pain.      - Respiratory: Negative for cough, sputum production, chest congestion, dyspnea, wheezing, and crackles.      - Cardiovascular: Negative for chest pain, palpitations, orthopnea, and bilateral lower extremity edema.     - Gastrointestinal: Negative for heartburn, nausea, vomiting, abdominal pain, hematochezia, melena, diarrhea, constipation, and greasy/foul-smelling stools.     - Genitourinary: Negative for dysuria, polyuria, hematuria, pyuria, urinary urgency, and urinary incontinence.     - Musculoskeletal:Positive for muscle spasms and diffuse pain.     - Skin: Negative for rash, itching, cyanotic skin color change.     - Neurological:Positive for numbness and neuropathic pain.  Negative for dizziness, tingling, tremors, headaches.     - Endo/Heme/Allergies: Does not bruise/bleed easily.     -  "Psychiatric/Behavioral: Negative for depression, suicidal/homicidal ideation and memory loss.          - NOTE: All other systems reviewed and are negative, except as in HPI.      Exam:    /74 (BP Location: Left arm, Patient Position: Sitting)   Pulse 72   Ht 1.6 m (5' 3\")   Wt 121.6 kg (268 lb)   SpO2 93%  Body mass index is 47.47 kg/m².    General:  Well nourished, well developed female in NAD  Head is grossly normal.  Neck: Supple.   Pulmonary: No accessory muscle use  Skin: No apparent lesions  Neuro: Alert and oriented  Psych: normal mood and affect    Please note that this dictation was created using voice recognition software. I have made every reasonable attempt to correct obvious errors, but I expect that there are errors of grammar and possibly content that I did not discover before finalizing the note.    LABS: 8/12/2020: Results reviewed and discussed with the patient, questions answered.  Lab Results   Component Value Date/Time    TSHULTRASEN 0.230 (L) 11/12/2019 1420       Assessment/Plan:  1. MS (multiple sclerosis) ([facility])  Controlled substance agreement sent electronically which she will sign and submit. Will continue with current regimen of valium and Soma.   - carisoprodol (SOMA) 350 MG Tab; Take 1 Tab by mouth 2 Times a Day for 90 days.  Dispense: 60 Tab; Refill: 2  - diazepam (VALIUM) 10 MG tablet; Take 1 Tab by mouth 2 Times a Day for 90 days.  Dispense: 60 Tab; Refill: 2  - Controlled Substance Treatment Agreement    2. Dyslipidemia  Will continue to monitor.   - CBC WITH DIFFERENTIAL; Future  - Comp Metabolic Panel; Future  - Lipid Profile; Future    3. Acquired hypothyroidism  Her dose of levothyroxine is too high right now so we will reduce her dose to 175 MCG and recheck her TSH again in 2 months.  - TSH WITH REFLEX TO FT4; Future  - levothyroxine (SYNTHROID) 175 MCG Tab; Take 1 Tab by mouth Every morning on an empty stomach for 90 days.  Dispense: 90 Tab; Refill: 0    4. Class 3 " severe obesity due to excess calories without serious comorbidity with body mass index (BMI) of 45.0 to 49.9 in adult (HCC)  Continue to monitor.

## 2020-09-14 ENCOUNTER — PATIENT MESSAGE (OUTPATIENT)
Dept: MEDICAL GROUP | Facility: PHYSICIAN GROUP | Age: 65
End: 2020-09-14

## 2020-09-14 DIAGNOSIS — G35 MULTIPLE SCLEROSIS (HCC): ICD-10-CM

## 2020-09-15 RX ORDER — DIAZEPAM 10 MG/1
TABLET ORAL
Qty: 60 TAB | Refills: 1 | Status: SHIPPED | OUTPATIENT
Start: 2020-09-15 | End: 2020-10-15

## 2020-09-15 RX ORDER — CARISOPRODOL 350 MG/1
TABLET ORAL
Qty: 60 TAB | Refills: 1 | Status: SHIPPED | OUTPATIENT
Start: 2020-09-15 | End: 2020-11-16

## 2020-11-06 DIAGNOSIS — E03.9 ACQUIRED HYPOTHYROIDISM: ICD-10-CM

## 2020-11-09 ENCOUNTER — PATIENT MESSAGE (OUTPATIENT)
Dept: MEDICAL GROUP | Facility: PHYSICIAN GROUP | Age: 65
End: 2020-11-09

## 2020-11-09 DIAGNOSIS — E03.9 ACQUIRED HYPOTHYROIDISM: ICD-10-CM

## 2020-11-09 RX ORDER — LEVOTHYROXINE SODIUM 175 UG/1
175 TABLET ORAL
Qty: 90 TAB | Refills: 0 | Status: SHIPPED | OUTPATIENT
Start: 2020-11-09 | End: 2021-02-05

## 2020-11-09 RX ORDER — LEVOTHYROXINE SODIUM 175 UG/1
175 TABLET ORAL
Qty: 90 TAB | Refills: 0 | Status: SHIPPED | OUTPATIENT
Start: 2020-11-09 | End: 2021-02-07

## 2020-11-09 NOTE — TELEPHONE ENCOUNTER
----- Message from Regina Richardson sent at 11/9/2020  9:12 AM PST -----  Regarding: Prescription Question  Contact: [phone]  Sharon Hernandez    I had a senior moment and completely forgot at 2 months to have my thyroid blood work done....  I am asking you   Is there a blood work order somewhere for me and  Would you please refill the  175 levethroxin, at the [facility]?    Please let me know as I only have 3 days left before I am out.    Getting old is so much fun   NOT    Thanks  Regina Richardson

## 2020-11-12 ENCOUNTER — PATIENT MESSAGE (OUTPATIENT)
Dept: MEDICAL GROUP | Facility: PHYSICIAN GROUP | Age: 65
End: 2020-11-12

## 2020-11-12 DIAGNOSIS — E03.9 ACQUIRED HYPOTHYROIDISM: ICD-10-CM

## 2020-11-12 DIAGNOSIS — J01.01 ACUTE RECURRENT MAXILLARY SINUSITIS: ICD-10-CM

## 2020-11-16 RX ORDER — LEVOTHYROXINE SODIUM 175 UG/1
175 TABLET ORAL
Qty: 90 TAB | Refills: 0 | Status: SHIPPED | OUTPATIENT
Start: 2020-11-16 | End: 2021-02-14

## 2020-11-16 RX ORDER — AZITHROMYCIN 250 MG/1
TABLET, FILM COATED ORAL
Qty: 6 TAB | Refills: 0 | Status: SHIPPED | OUTPATIENT
Start: 2020-11-16 | End: 2021-04-09

## 2020-12-16 DIAGNOSIS — G35 MULTIPLE SCLEROSIS (HCC): ICD-10-CM

## 2020-12-18 RX ORDER — CARISOPRODOL 350 MG/1
TABLET ORAL
Qty: 60 TAB | Refills: 0 | Status: SHIPPED | OUTPATIENT
Start: 2020-12-18 | End: 2021-01-11 | Stop reason: SDUPTHER

## 2021-01-06 ENCOUNTER — HOSPITAL ENCOUNTER (OUTPATIENT)
Dept: LAB | Facility: MEDICAL CENTER | Age: 66
End: 2021-01-06
Attending: PHYSICIAN ASSISTANT
Payer: MEDICARE

## 2021-01-06 DIAGNOSIS — E78.5 DYSLIPIDEMIA: ICD-10-CM

## 2021-01-06 DIAGNOSIS — E03.9 ACQUIRED HYPOTHYROIDISM: ICD-10-CM

## 2021-01-06 LAB
ALBUMIN SERPL BCP-MCNC: 4.7 G/DL (ref 3.2–4.9)
ALBUMIN/GLOB SERPL: 1.5 G/DL
ALP SERPL-CCNC: 114 U/L (ref 30–99)
ALT SERPL-CCNC: 11 U/L (ref 2–50)
ANION GAP SERPL CALC-SCNC: 12 MMOL/L (ref 7–16)
AST SERPL-CCNC: 11 U/L (ref 12–45)
BASOPHILS # BLD AUTO: 0.7 % (ref 0–1.8)
BASOPHILS # BLD: 0.07 K/UL (ref 0–0.12)
BILIRUB SERPL-MCNC: 0.3 MG/DL (ref 0.1–1.5)
BUN SERPL-MCNC: 14 MG/DL (ref 8–22)
CALCIUM SERPL-MCNC: 9.7 MG/DL (ref 8.5–10.5)
CHLORIDE SERPL-SCNC: 102 MMOL/L (ref 96–112)
CHOLEST SERPL-MCNC: 220 MG/DL (ref 100–199)
CO2 SERPL-SCNC: 26 MMOL/L (ref 20–33)
CREAT SERPL-MCNC: 0.88 MG/DL (ref 0.5–1.4)
EOSINOPHIL # BLD AUTO: 0.17 K/UL (ref 0–0.51)
EOSINOPHIL NFR BLD: 1.6 % (ref 0–6.9)
ERYTHROCYTE [DISTWIDTH] IN BLOOD BY AUTOMATED COUNT: 45.5 FL (ref 35.9–50)
FASTING STATUS PATIENT QL REPORTED: NORMAL
GLOBULIN SER CALC-MCNC: 3.1 G/DL (ref 1.9–3.5)
GLUCOSE SERPL-MCNC: 106 MG/DL (ref 65–99)
HCT VFR BLD AUTO: 51.3 % (ref 37–47)
HDLC SERPL-MCNC: 49 MG/DL
HGB BLD-MCNC: 16.2 G/DL (ref 12–16)
IMM GRANULOCYTES # BLD AUTO: 0.04 K/UL (ref 0–0.11)
IMM GRANULOCYTES NFR BLD AUTO: 0.4 % (ref 0–0.9)
LDLC SERPL CALC-MCNC: 137 MG/DL
LYMPHOCYTES # BLD AUTO: 2.51 K/UL (ref 1–4.8)
LYMPHOCYTES NFR BLD: 23.6 % (ref 22–41)
MCH RBC QN AUTO: 30.1 PG (ref 27–33)
MCHC RBC AUTO-ENTMCNC: 31.6 G/DL (ref 33.6–35)
MCV RBC AUTO: 95.2 FL (ref 81.4–97.8)
MONOCYTES # BLD AUTO: 0.45 K/UL (ref 0–0.85)
MONOCYTES NFR BLD AUTO: 4.2 % (ref 0–13.4)
NEUTROPHILS # BLD AUTO: 7.4 K/UL (ref 2–7.15)
NEUTROPHILS NFR BLD: 69.5 % (ref 44–72)
NRBC # BLD AUTO: 0 K/UL
NRBC BLD-RTO: 0 /100 WBC
PLATELET # BLD AUTO: 316 K/UL (ref 164–446)
PMV BLD AUTO: 10.5 FL (ref 9–12.9)
POTASSIUM SERPL-SCNC: 4.7 MMOL/L (ref 3.6–5.5)
PROT SERPL-MCNC: 7.8 G/DL (ref 6–8.2)
RBC # BLD AUTO: 5.39 M/UL (ref 4.2–5.4)
SODIUM SERPL-SCNC: 140 MMOL/L (ref 135–145)
TRIGL SERPL-MCNC: 171 MG/DL (ref 0–149)
TSH SERPL DL<=0.005 MIU/L-ACNC: 0.73 UIU/ML (ref 0.38–5.33)
WBC # BLD AUTO: 10.6 K/UL (ref 4.8–10.8)

## 2021-01-06 PROCEDURE — 85025 COMPLETE CBC W/AUTO DIFF WBC: CPT

## 2021-01-06 PROCEDURE — 84443 ASSAY THYROID STIM HORMONE: CPT

## 2021-01-06 PROCEDURE — 80053 COMPREHEN METABOLIC PANEL: CPT

## 2021-01-06 PROCEDURE — 80061 LIPID PANEL: CPT

## 2021-01-06 PROCEDURE — 36415 COLL VENOUS BLD VENIPUNCTURE: CPT

## 2021-01-11 ENCOUNTER — TELEMEDICINE (OUTPATIENT)
Dept: MEDICAL GROUP | Facility: PHYSICIAN GROUP | Age: 66
End: 2021-01-11
Payer: MEDICARE

## 2021-01-11 VITALS
HEIGHT: 63 IN | DIASTOLIC BLOOD PRESSURE: 70 MMHG | WEIGHT: 278 LBS | OXYGEN SATURATION: 96 % | BODY MASS INDEX: 49.26 KG/M2 | SYSTOLIC BLOOD PRESSURE: 116 MMHG | HEART RATE: 82 BPM

## 2021-01-11 DIAGNOSIS — E03.9 ACQUIRED HYPOTHYROIDISM: ICD-10-CM

## 2021-01-11 DIAGNOSIS — G35 MULTIPLE SCLEROSIS (HCC): ICD-10-CM

## 2021-01-11 PROCEDURE — 99213 OFFICE O/P EST LOW 20 MIN: CPT | Mod: 95,CR | Performed by: PHYSICIAN ASSISTANT

## 2021-01-11 RX ORDER — CARISOPRODOL 350 MG/1
350 TABLET ORAL 2 TIMES DAILY
Qty: 180 TAB | Refills: 0 | Status: SHIPPED | OUTPATIENT
Start: 2021-01-11 | End: 2021-04-09 | Stop reason: SDUPTHER

## 2021-01-11 RX ORDER — DIAZEPAM 10 MG/1
10 TABLET ORAL 2 TIMES DAILY
COMMUNITY
Start: 2020-11-13 | End: 2021-01-11 | Stop reason: SDUPTHER

## 2021-01-11 RX ORDER — DIAZEPAM 10 MG/1
10 TABLET ORAL 2 TIMES DAILY
Qty: 180 TAB | Refills: 0 | Status: SHIPPED | OUTPATIENT
Start: 2021-01-11 | End: 2021-04-09 | Stop reason: SDUPTHER

## 2021-01-11 ASSESSMENT — FIBROSIS 4 INDEX: FIB4 SCORE: 0.66

## 2021-01-11 ASSESSMENT — PATIENT HEALTH QUESTIONNAIRE - PHQ9
5. POOR APPETITE OR OVEREATING: 2 - MORE THAN HALF THE DAYS
CLINICAL INTERPRETATION OF PHQ2 SCORE: 6
SUM OF ALL RESPONSES TO PHQ QUESTIONS 1-9: 11

## 2021-01-11 NOTE — PROGRESS NOTES
Telemedicine Visit: Established Patient     This encounter was conducted via Zoom.   Verbal consent was obtained. Patient's identity was verified.    CC:  Chief Complaint   Patient presents with   • Lab Results   • Medication Refill       HISTORY OF PRESENT ILLNESS: Patient is a 65 y.o. female established patient presenting for lab review.   1. Pt has HLD but has been intolerant of cholesterol meds in the past.   2. Pt's TSH within normal range since reducing dose of levothyroxine to 175mcg.   3. Pt needs refill of her soma and valium for her MS.       Patient Active Problem List    Diagnosis Date Noted   • Nocturnal hypoxia 05/21/2018     Priority: High   • Class 3 severe obesity without serious comorbidity in adult ([facility]) 02/13/2020   • Acquired hypothyroidism 05/29/2018   • Seasonal allergic rhinitis due to pollen 05/29/2018   • Gait instability 09/25/2017   • Neurogenic bladder 09/25/2017   • Spasticity 09/25/2017   • MS (multiple sclerosis) ([facility]) 07/06/2017   • Dyslipidemia 07/06/2017   • Vitamin D deficiency 07/06/2017      Allergies:Bee venom, Other misc, Sulfa drugs, and Tape    Current Outpatient Medications   Medication Sig Dispense Refill   • diazepam (VALIUM) 10 MG tablet Take 10 mg by mouth 2 Times a Day. FOR 90 DAYS     • carisoprodol (SOMA) 350 MG Tab TAKE 1 TABLET BY MOUTH TWICE A DAY 60 Tab 0   • levothyroxine (SYNTHROID) 175 MCG Tab TAKE 1 TAB BY MOUTH EVERY MORNING ON AN EMPTY STOMACH FOR 90 DAYS. 90 Tab 0   • Home Care Oxygen Inhale 2 L/min by mouth every bedtime.     • Cholecalciferol (VITAMIN D PO) Take 3,000 Units by mouth every day.     • levothyroxine (SYNTHROID) 175 MCG Tab Take 1 Tab by mouth Every morning on an empty stomach for 90 days. 90 Tab 0   • azithromycin (ZITHROMAX) 250 MG Tab Take orally as directed 6 Tab 0   • levothyroxine (SYNTHROID) 175 MCG Tab Take 1 Tab by mouth Every morning on an empty stomach for 90 days. 90 Tab 0   • diphenhydrAMINE (BENADRYL) 25 MG Tab Take 25 mg by  mouth at bedtime as needed for Sleep.       No current facility-administered medications for this visit.        Social History     Tobacco Use   • Smoking status: Former Smoker     Packs/day: 0.00     Years: 15.00     Pack years: 0.00     Quit date: 2018     Years since quittin.6   • Smokeless tobacco: Never Used   Substance Use Topics   • Alcohol use: No   • Drug use: No     Social History     Social History Narrative   • Not on file       Family History   Problem Relation Age of Onset   • Autoimmune Disease Daughter         MS   • Other Daughter         fibromyalgia        ROS:     - Constitutional: Positive for fatigue.  Negative for fever, chills, unexpected weight change, and generalized weakness.    - HEENT:  Negative for headaches, vision changes, hearing changes, ear pain, ear discharge, rhinorrhea, sinus congestion, sore throat, and neck pain.      - Respiratory: Negative for cough, sputum production, chest congestion, dyspnea, wheezing, and crackles.      - Cardiovascular: Negative for chest pain, palpitations, orthopnea, and bilateral lower extremity edema.     - Gastrointestinal: Negative for heartburn, nausea, vomiting, abdominal pain, hematochezia, melena, diarrhea, constipation, and greasy/foul-smelling stools.     - Genitourinary: Negative for dysuria, polyuria, hematuria, pyuria, urinary urgency, and urinary incontinence.     - Musculoskeletal:Positive for myalgias.  Negative for  back pain, and joint pain.     - Skin: Negative for rash, itching, cyanotic skin color change.     - Neurological:Positive for weakness. Negative for dizziness, tingling, tremors, focal sensory deficit,and headaches.     - Endo/Heme/Allergies: Does not bruise/bleed easily.     - Psychiatric/Behavioral: Negative for depression, suicidal/homicidal ideation and memory loss.          - NOTE: All other systems reviewed and are negative, except as in HPI.      Exam:    /70 (BP Location: Left arm, Patient Position:  "Sitting, BP Cuff Size: Adult)   Pulse 82   Ht 1.6 m (5' 3\")   Wt (!) 126.1 kg (278 lb)   SpO2 96%  Body mass index is 49.25 kg/m².    General:  Well nourished, well developed female in NAD  Head is grossly normal.  Neck: Supple.   Pulmonary: No accessory muscle use  Skin: No apparent lesions  Neuro: Alert and oriented  Psych: normal mood and affect    Please note that this dictation was created using voice recognition software. I have made every reasonable attempt to correct obvious errors, but I expect that there are errors of grammar and possibly content that I did not discover before finalizing the note.    LABS: 1/11/2021: Results reviewed and discussed with the patient, questions answered.    Assessment/Plan:  1. MS (multiple sclerosis) (HCC)  PDMP checked. Recheck in 3 months  - carisoprodol (SOMA) 350 MG Tab; Take 1 Tab by mouth 2 times a day for 90 days.  Dispense: 180 Tab; Refill: 0  - diazepam (VALIUM) 10 MG tablet; Take 1 Tab by mouth 2 Times a Day for 90 days.  Dispense: 180 Tab; Refill: 0    2. Acquired hypothyroidism  Will stay at 175mcg levothyroxine    "

## 2021-02-05 DIAGNOSIS — E03.9 ACQUIRED HYPOTHYROIDISM: ICD-10-CM

## 2021-02-05 RX ORDER — LEVOTHYROXINE SODIUM 175 UG/1
175 TABLET ORAL
Qty: 90 TAB | Refills: 0 | Status: SHIPPED | OUTPATIENT
Start: 2021-02-05 | End: 2021-05-06

## 2021-04-09 ENCOUNTER — TELEMEDICINE (OUTPATIENT)
Dept: MEDICAL GROUP | Facility: PHYSICIAN GROUP | Age: 66
End: 2021-04-09
Payer: MEDICARE

## 2021-04-09 VITALS
BODY MASS INDEX: 46.95 KG/M2 | WEIGHT: 265 LBS | HEIGHT: 63 IN | HEART RATE: 72 BPM | OXYGEN SATURATION: 93 % | DIASTOLIC BLOOD PRESSURE: 70 MMHG | TEMPERATURE: 98.6 F | SYSTOLIC BLOOD PRESSURE: 119 MMHG

## 2021-04-09 DIAGNOSIS — E66.01 CLASS 3 SEVERE OBESITY DUE TO EXCESS CALORIES WITHOUT SERIOUS COMORBIDITY WITH BODY MASS INDEX (BMI) OF 45.0 TO 49.9 IN ADULT (HCC): ICD-10-CM

## 2021-04-09 DIAGNOSIS — L98.9 SKIN LESION OF FACE: ICD-10-CM

## 2021-04-09 DIAGNOSIS — G35 MULTIPLE SCLEROSIS (HCC): ICD-10-CM

## 2021-04-09 PROCEDURE — 99213 OFFICE O/P EST LOW 20 MIN: CPT | Mod: 95,CR | Performed by: PHYSICIAN ASSISTANT

## 2021-04-09 RX ORDER — CARISOPRODOL 350 MG/1
350 TABLET ORAL 2 TIMES DAILY
Qty: 180 TABLET | Refills: 0 | Status: SHIPPED | OUTPATIENT
Start: 2021-04-09 | End: 2021-07-07 | Stop reason: SDUPTHER

## 2021-04-09 RX ORDER — DIAZEPAM 10 MG/1
10 TABLET ORAL 2 TIMES DAILY
Qty: 180 TABLET | Refills: 0 | Status: SHIPPED | OUTPATIENT
Start: 2021-04-09 | End: 2021-07-07 | Stop reason: SDUPTHER

## 2021-04-09 ASSESSMENT — FIBROSIS 4 INDEX: FIB4 SCORE: 0.66

## 2021-04-09 NOTE — PROGRESS NOTES
Telemedicine Visit: Established Patient     This encounter was conducted via Zoom.   Verbal consent was obtained. Patient's identity was verified.    CC:  Chief Complaint   Patient presents with   • Medication Refill       HISTORY OF PRESENT ILLNESS: Patient is a 65 y.o. female established patient presenting for medication refill. Taking valium and soma for her MS.     Pt would like derm referral for lesion over the L infraocular area. Has been removed by dermatologist before but lesion returned.       No problem-specific Assessment & Plan notes found for this encounter.      Patient Active Problem List    Diagnosis Date Noted   • Nocturnal hypoxia 05/21/2018   • Class 3 severe obesity without serious comorbidity in adult ([facility]) 02/13/2020   • Acquired hypothyroidism 05/29/2018   • Seasonal allergic rhinitis due to pollen 05/29/2018   • Gait instability 09/25/2017   • Neurogenic bladder 09/25/2017   • Spasticity 09/25/2017   • MS (multiple sclerosis) ([facility]) 07/06/2017   • Dyslipidemia 07/06/2017   • Vitamin D deficiency 07/06/2017      Allergies:Bee venom, Other misc, Sulfa drugs, and Tape    Current Outpatient Medications   Medication Sig Dispense Refill   • levothyroxine (SYNTHROID) 175 MCG Tab TAKE 1 TAB BY MOUTH EVERY MORNING ON AN EMPTY STOMACH FOR 90 DAYS. 90 Tab 0   • carisoprodol (SOMA) 350 MG Tab Take 1 Tab by mouth 2 times a day for 90 days. 180 Tab 0   • diazepam (VALIUM) 10 MG tablet Take 1 Tab by mouth 2 Times a Day for 90 days. 180 Tab 0   • Home Care Oxygen Inhale 2 L/min by mouth every bedtime.     • Cholecalciferol (VITAMIN D PO) Take 3,000 Units by mouth every day.     • azithromycin (ZITHROMAX) 250 MG Tab Take orally as directed 6 Tab 0   • diphenhydrAMINE (BENADRYL) 25 MG Tab Take 25 mg by mouth at bedtime as needed for Sleep.       No current facility-administered medications for this visit.       Social History     Tobacco Use   • Smoking status: Former Smoker     Packs/day: 0.00     Years: 15.00  "    Pack years: 0.00     Quit date: 2018     Years since quittin.8   • Smokeless tobacco: Never Used   Substance Use Topics   • Alcohol use: No   • Drug use: No     Social History     Social History Narrative   • Not on file       Family History   Problem Relation Age of Onset   • Autoimmune Disease Daughter         MS   • Other Daughter         fibromyalgia        ROS:     - Constitutional:  Negative for fever, chills, unexpected weight change, and fatigue/generalized weakness.    - HEENT:  Negative for headaches, vision changes, hearing changes, ear pain, ear discharge, rhinorrhea, sinus congestion, sore throat, and neck pain.      - Respiratory: Negative for cough, sputum production, chest congestion, dyspnea, wheezing, and crackles.      - Cardiovascular: Negative for chest pain, palpitations, orthopnea, and bilateral lower extremity edema.     - Gastrointestinal: Negative for heartburn, nausea, vomiting, abdominal pain, hematochezia, melena, diarrhea, constipation, and greasy/foul-smelling stools.     - Genitourinary: Negative for dysuria, polyuria, hematuria, pyuria, urinary urgency, and urinary incontinence.     - Musculoskeletal:Positive for myalgias.  Negative for  and joint pain.     - Skin: Positive for skin lesion. Negative for rash, itching, cyanotic skin color change.     - Neurological:Positive for weakness.  Negative for dizziness, tingling, tremors, focal sensory deficit,  and headaches.     - Endo/Heme/Allergies: Does not bruise/bleed easily.     - Psychiatric/Behavioral: Negative for depression, suicidal/homicidal ideation and memory loss.              Exam:    /70 (BP Location: Left arm, Patient Position: Sitting, BP Cuff Size: Adult)   Pulse 72   Temp 37 °C (98.6 °F) (Temporal)   Ht 1.6 m (5' 3\")   Wt 120 kg (265 lb)   SpO2 93%  Body mass index is 46.94 kg/m².    General:  Well nourished, well developed female in NAD  Head is grossly normal.  Neck: Supple.   Pulmonary: No " accessory muscle use  Skin: Positive for brown lesion over L cheek  Neuro: Alert and oriented  Psych: normal mood and affect    Please note that this dictation was created using voice recognition software. I have made every reasonable attempt to correct obvious errors, but I expect that there are errors of grammar and possibly content that I did not discover before finalizing the note.        Assessment/Plan:  1. Class 3 severe obesity due to excess calories without serious comorbidity with body mass index (BMI) of 45.0 to 49.9 in adult ([facility])  Continue to monitor    2. MS (multiple sclerosis) ([facility])  PDMP checked. Refills sent in. Recheck in 3 months  - carisoprodol (SOMA) 350 MG Tab; Take 1 tablet by mouth 2 times a day for 90 days.  Dispense: 180 tablet; Refill: 0  - diazepam (VALIUM) 10 MG tablet; Take 1 tablet by mouth 2 times a day for 90 days.  Dispense: 180 tablet; Refill: 0    3. Skin lesion of face  Referral placed  - REFERRAL TO DERMATOLOGY

## 2021-07-07 ENCOUNTER — TELEMEDICINE (OUTPATIENT)
Dept: MEDICAL GROUP | Facility: PHYSICIAN GROUP | Age: 66
End: 2021-07-07
Payer: MEDICARE

## 2021-07-07 DIAGNOSIS — G35 MULTIPLE SCLEROSIS (HCC): ICD-10-CM

## 2021-07-07 DIAGNOSIS — Z91.030 BEE STING ALLERGY: ICD-10-CM

## 2021-07-07 PROCEDURE — 99213 OFFICE O/P EST LOW 20 MIN: CPT | Mod: 95 | Performed by: PHYSICIAN ASSISTANT

## 2021-07-07 RX ORDER — LEVOTHYROXINE SODIUM 175 UG/1
175 TABLET ORAL
COMMUNITY
End: 2021-07-08

## 2021-07-07 RX ORDER — CARISOPRODOL 350 MG/1
350 TABLET ORAL 2 TIMES DAILY
Qty: 180 TABLET | Refills: 0 | Status: SHIPPED | OUTPATIENT
Start: 2021-07-07 | End: 2021-10-05 | Stop reason: SDUPTHER

## 2021-07-07 RX ORDER — EPINEPHRINE 0.3 MG/.3ML
0.3 INJECTION SUBCUTANEOUS ONCE
Qty: 1 EACH | Refills: 0 | Status: SHIPPED | OUTPATIENT
Start: 2021-07-07 | End: 2021-07-07

## 2021-07-07 RX ORDER — DIAZEPAM 10 MG/1
10 TABLET ORAL 2 TIMES DAILY
Qty: 180 TABLET | Refills: 0 | Status: SHIPPED | OUTPATIENT
Start: 2021-07-07 | End: 2021-10-05 | Stop reason: SDUPTHER

## 2021-07-07 NOTE — PROGRESS NOTES
Telemedicine Visit: Established Patient     This encounter was conducted via Zoom.   Verbal consent was obtained. Patient's identity was verified.    CC:  Chief Complaint   Patient presents with   • Medication Refill       HISTORY OF PRESENT ILLNESS: Patient is a 65 y.o. female established patient presenting for recheck.   1. Pt having a headache every morning when waking up for past 3 weeks. Located over forehead and cheeks. Never had sinus problems previously. Getting allergic rhinitis. Has been taking claritin and flonase with poor effect.   2. Pt due for refills of her Soma and her valium. Taking these for treatment for her MS.   3. Pt also requesting refill of her epipen because previous one .     No problem-specific Assessment & Plan notes found for this encounter.      Patient Active Problem List    Diagnosis Date Noted   • Class 3 severe obesity without serious comorbidity in adult ([facility]) 2020   • Acquired hypothyroidism 2018   • Seasonal allergic rhinitis due to pollen 2018   • Nocturnal hypoxia 2018   • Gait instability 2017   • Neurogenic bladder 2017   • Spasticity 2017   • MS (multiple sclerosis) ([facility]) 2017   • Dyslipidemia 2017   • Vitamin D deficiency 2017      Allergies:Bee venom, Other misc, Sulfa drugs, and Tape    Current Outpatient Medications   Medication Sig Dispense Refill   • levothyroxine (SYNTHROID) 175 MCG Tab Take 175 mcg by mouth every morning on an empty stomach.     • carisoprodol (SOMA) 350 MG Tab Take 1 tablet by mouth 2 times a day for 90 days. 180 tablet 0   • diazepam (VALIUM) 10 MG tablet Take 1 tablet by mouth 2 times a day for 90 days. 180 tablet 0   • Home Care Oxygen Inhale 2 L/min by mouth every bedtime.     • Cholecalciferol (VITAMIN D PO) Take 3,000 Units by mouth every day.       No current facility-administered medications for this visit.       Social History     Tobacco Use   • Smoking status: Former  Smoker     Packs/day: 0.00     Years: 15.00     Pack years: 0.00     Quit date: 5/17/2018     Years since quitting: 3.1   • Smokeless tobacco: Never Used   Vaping Use   • Vaping Use: Never used   Substance Use Topics   • Alcohol use: No   • Drug use: No     Social History     Social History Narrative   • Not on file       Family History   Problem Relation Age of Onset   • Autoimmune Disease Daughter         MS   • Other Daughter         fibromyalgia        ROS:    - Constitutional:  Negative for fever, chills, unexpected weight change, and fatigue/generalized weakness.    - HEENT:  Negative for headaches, vision changes, hearing changes, ear pain, ear discharge, rhinorrhea, sinus congestion, sore throat, and neck pain.      - Respiratory: Negative for cough, sputum production, chest congestion, dyspnea, wheezing, and crackles.      - Cardiovascular: Negative for chest pain, palpitations, orthopnea, and bilateral lower extremity edema.     - Gastrointestinal: Negative for heartburn, nausea, vomiting, abdominal pain, hematochezia, melena, diarrhea, constipation, and greasy/foul-smelling stools.     - Genitourinary: Negative for dysuria, polyuria, hematuria, pyuria, urinary urgency, and urinary incontinence.     - Musculoskeletal:Positive for myalgias.  Negative for  and joint pain.     - Skin: Positive for skin lesion. Negative for rash, itching, cyanotic skin color change.     - Neurological:Positive for weakness.  Negative for dizziness, tingling, tremors, focal sensory deficit,  and headaches.     - Endo/Heme/Allergies: Does not bruise/bleed easily.     - Psychiatric/Behavioral: Negative for depression, suicidal/homicidal ideation and memory loss.    Exam:    There were no vitals taken for this visit. There is no height or weight on file to calculate BMI.    General:  Well nourished, well developed female in NAD  Head is grossly normal.  Neck: Supple.   Pulmonary: No accessory muscle use  Skin: No apparent  lesions  Neuro: Alert and oriented  Psych: normal mood and affect    Please note that this dictation was created using voice recognition software. I have made every reasonable attempt to correct obvious errors, but I expect that there are errors of grammar and possibly content that I did not discover before finalizing the note.        Assessment/Plan:  1. Bee sting allergy  Refill sent in.  - EPINEPHrine (EPIPEN 2-CRISTHIAN) 0.3 MG/0.3ML Solution Auto-injector solution for injection; Inject 0.3 mL into the shoulder, thigh, or buttocks one time for 1 dose.  Dispense: 1 Each; Refill: 0    2. MS (multiple sclerosis) ([facility])  PDMP checked.  She continues to do well with Valium and Soma for her MS.  Refill sent in.  I will see her again for this in 3 months  - diazepam (VALIUM) 10 MG tablet; Take 1 tablet by mouth 2 times a day for 90 days.  Dispense: 180 tablet; Refill: 0  - carisoprodol (SOMA) 350 MG Tab; Take 1 tablet by mouth 2 times a day for 90 days.  Dispense: 180 tablet; Refill: 0

## 2021-07-08 RX ORDER — LEVOTHYROXINE SODIUM 175 UG/1
TABLET ORAL
Qty: 90 TABLET | Refills: 0 | Status: SHIPPED | OUTPATIENT
Start: 2021-07-08 | End: 2021-10-07

## 2021-10-05 ENCOUNTER — TELEMEDICINE (OUTPATIENT)
Dept: MEDICAL GROUP | Facility: PHYSICIAN GROUP | Age: 66
End: 2021-10-05
Payer: MEDICARE

## 2021-10-05 VITALS
WEIGHT: 278 LBS | SYSTOLIC BLOOD PRESSURE: 119 MMHG | DIASTOLIC BLOOD PRESSURE: 60 MMHG | HEART RATE: 74 BPM | HEIGHT: 63 IN | BODY MASS INDEX: 49.26 KG/M2

## 2021-10-05 DIAGNOSIS — E78.5 DYSLIPIDEMIA: ICD-10-CM

## 2021-10-05 DIAGNOSIS — G35 MULTIPLE SCLEROSIS (HCC): ICD-10-CM

## 2021-10-05 DIAGNOSIS — E03.9 ACQUIRED HYPOTHYROIDISM: ICD-10-CM

## 2021-10-05 PROCEDURE — 99213 OFFICE O/P EST LOW 20 MIN: CPT | Mod: 95 | Performed by: PHYSICIAN ASSISTANT

## 2021-10-05 RX ORDER — DIAZEPAM 10 MG/1
10 TABLET ORAL 2 TIMES DAILY
Qty: 180 TABLET | Refills: 0 | Status: SHIPPED | OUTPATIENT
Start: 2021-10-05 | End: 2022-01-03

## 2021-10-05 RX ORDER — CARISOPRODOL 350 MG/1
350 TABLET ORAL 2 TIMES DAILY
Qty: 180 TABLET | Refills: 0 | Status: SHIPPED | OUTPATIENT
Start: 2021-10-05 | End: 2022-01-03

## 2021-10-05 ASSESSMENT — FIBROSIS 4 INDEX: FIB4 SCORE: 0.66

## 2021-10-05 NOTE — PROGRESS NOTES
Telemedicine Visit: Established Patient     This encounter was conducted via Zoom.   Verbal consent was obtained. Patient's identity was verified.    CC:  Chief Complaint   Patient presents with   • Medication Refill       HISTORY OF PRESENT ILLNESS: Patient is a 65 y.o. female established patient presenting for medication recheck.   1. Pt has not had mammogram done in a while.   2. Pt due for refill of her soma and valium for her MS.   3. Pt's last cologuard was in 2019.     No problem-specific Assessment & Plan notes found for this encounter.      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 90 tablet 0   • diazepam (VALIUM) 10 MG tablet Take 1 tablet by mouth 2 times a day for 90 days. 180 tablet 0   • carisoprodol (SOMA) 350 MG Tab Take 1 tablet by mouth 2 times a day for 90 days. 180 tablet 0   • Home Care Oxygen Inhale 2 L/min by mouth every bedtime.     • Cholecalciferol (VITAMIN D PO) Take 3,000 Units by mouth every day.       No current facility-administered medications for this visit.       Social History     Tobacco Use   • Smoking status: Former Smoker     Packs/day: 0.00     Years: 15.00     Pack years: 0.00     Quit date: 5/17/2018     Years since quitting: 3.3   • Smokeless tobacco: Never Used   Vaping Use   • Vaping Use: Never used   Substance  "Use Topics   • Alcohol use: No   • Drug use: No     Social History     Social History Narrative   • Not on file       Family History   Problem Relation Age of Onset   • Autoimmune Disease Daughter         MS   • Other Daughter         fibromyalgia        ROS:       - Constitutional: Positive for fatigue.  Negative for fever, chills, unexpected weight change, and generalized weakness.    - HEENT:  Negative for headaches, vision changes, hearing changes, ear pain, ear discharge, rhinorrhea, sinus congestion, sore throat, and neck pain.      - Respiratory: Negative for cough, sputum production, chest congestion, dyspnea, wheezing, and crackles.      - Cardiovascular: Negative for chest pain, palpitations, orthopnea, and bilateral lower extremity edema.     - Gastrointestinal: Negative for heartburn, nausea, vomiting, abdominal pain, hematochezia, melena, diarrhea, constipation, and greasy/foul-smelling stools.     - Genitourinary: Negative for dysuria, polyuria, hematuria, pyuria, urinary urgency, and urinary incontinence.     - Musculoskeletal:Positive for myalgias.  Negative for  back pain, and joint pain.     - Skin: Negative for rash, itching, cyanotic skin color change.     - Neurological:Positive for weakness. Negative for dizziness, tingling, tremors, focal sensory deficit,and headaches.     - Endo/Heme/Allergies: Does not bruise/bleed easily.     - Psychiatric/Behavioral: Negative for depression, suicidal/homicidal ideation and memory loss.          Exam:    /60   Pulse 74   Ht 1.6 m (5' 3\")   Wt (!) 126 kg (278 lb)  Body mass index is 49.25 kg/m².    General:  Well nourished, well developed female in NAD  Head is grossly normal.  Neck: Supple.   Pulmonary: No accessory muscle use  Skin: No apparent lesions  Neuro: Alert and oriented  Psych: normal mood and affect    Please note that this dictation was created using voice recognition software. I have made every reasonable attempt to correct obvious " errors, but I expect that there are errors of grammar and possibly content that I did not discover before finalizing the note.        Assessment/Plan:  1. Dyslipidemia  Will check labs and review at next visit  - CBC WITH DIFFERENTIAL; Future  - Comp Metabolic Panel; Future  - Lipid Profile; Future    2. Acquired hypothyroidism  Review at next visit  - TSH WITH REFLEX TO FT4; Future    3. MS (multiple sclerosis) (HCC)  PDMP checked. Refills sent in  - diazepam (VALIUM) 10 MG tablet; Take 1 Tablet by mouth 2 times a day for 90 days.  Dispense: 180 Tablet; Refill: 0  - carisoprodol (SOMA) 350 MG Tab; Take 1 Tablet by mouth 2 times a day for 90 days.  Dispense: 180 Tablet; Refill: 0

## 2021-10-08 RX ORDER — LEVOTHYROXINE SODIUM 175 UG/1
TABLET ORAL
Qty: 90 TABLET | Refills: 0 | Status: SHIPPED | OUTPATIENT
Start: 2021-10-08 | End: 2022-01-10 | Stop reason: SDUPTHER

## 2022-01-10 ENCOUNTER — TELEMEDICINE (OUTPATIENT)
Dept: MEDICAL GROUP | Facility: PHYSICIAN GROUP | Age: 67
End: 2022-01-10
Payer: MEDICARE

## 2022-01-10 VITALS
SYSTOLIC BLOOD PRESSURE: 110 MMHG | WEIGHT: 267 LBS | HEIGHT: 63 IN | BODY MASS INDEX: 47.31 KG/M2 | TEMPERATURE: 98.6 F | DIASTOLIC BLOOD PRESSURE: 60 MMHG

## 2022-01-10 DIAGNOSIS — G35 MULTIPLE SCLEROSIS (HCC): ICD-10-CM

## 2022-01-10 DIAGNOSIS — E03.9 ACQUIRED HYPOTHYROIDISM: ICD-10-CM

## 2022-01-10 PROCEDURE — 99213 OFFICE O/P EST LOW 20 MIN: CPT | Mod: 95 | Performed by: PHYSICIAN ASSISTANT

## 2022-01-10 RX ORDER — LEVOTHYROXINE SODIUM 175 UG/1
175 TABLET ORAL
Qty: 90 TABLET | Refills: 3 | Status: SHIPPED | OUTPATIENT
Start: 2022-01-10 | End: 2022-07-07 | Stop reason: SDUPTHER

## 2022-01-10 RX ORDER — CARISOPRODOL 350 MG/1
350 TABLET ORAL 2 TIMES DAILY
Qty: 180 TABLET | Refills: 0 | Status: SHIPPED | OUTPATIENT
Start: 2022-01-10 | End: 2022-04-07 | Stop reason: SDUPTHER

## 2022-01-10 RX ORDER — DIAZEPAM 10 MG/1
10 TABLET ORAL 2 TIMES DAILY
Qty: 180 TABLET | Refills: 0 | Status: SHIPPED | OUTPATIENT
Start: 2022-01-10 | End: 2022-04-07 | Stop reason: SDUPTHER

## 2022-01-10 ASSESSMENT — FIBROSIS 4 INDEX: FIB4 SCORE: 0.67

## 2022-01-10 ASSESSMENT — PATIENT HEALTH QUESTIONNAIRE - PHQ9: CLINICAL INTERPRETATION OF PHQ2 SCORE: 0

## 2022-01-10 NOTE — PROGRESS NOTES
Telemedicine Visit: Established Patient     This encounter was conducted via Zoom.   Verbal consent was obtained. Patient's identity was verified.    CC:  Chief Complaint   Patient presents with   • Medication Refill     valium, levothyroxine, carisoprodol        HISTORY OF PRESENT ILLNESS: Patient is a 66 y.o. female established patient presenting for medication recheck. Taking valium and soma for her MS with good effectiveness.       No problem-specific Assessment & Plan notes found for this encounter.      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 90 Tablet 0   • Home Care Oxygen Inhale 2 L/min by mouth every bedtime.     • Cholecalciferol (VITAMIN D PO) Take 3,000 Units by mouth every day.       No current facility-administered medications for this visit.       Social History     Tobacco Use   • Smoking status: Former Smoker     Packs/day: 0.00     Years: 15.00     Pack years: 0.00     Quit date: 5/17/2018     Years since quitting: 3.6   • Smokeless tobacco: Never Used   Vaping Use   • Vaping Use: Never used   Substance Use Topics   • Alcohol use: No   • Drug use: No     Social History     Social History Narrative   • Not on file       Family History   Problem Relation Age of Onset   • Autoimmune Disease Daughter         MS   • Other Daughter          "fibromyalgia        ROS:     - Constitutional:  Negative for fever, chills, unexpected weight change, and fatigue/generalized weakness.     - Musculoskeletal:Positive for myalgias. Negative for  back pain, and joint pain.        Exam:    /60   Temp 37 °C (98.6 °F)   Ht 1.6 m (5' 3\")   Wt 121 kg (267 lb)  Body mass index is 47.3 kg/m².    General:  Well nourished, well developed female in NAD  Head is grossly normal.  Neck: Supple.   Pulmonary: No accessory muscle use  Skin: No apparent lesions  Neuro: Alert and oriented  Psych: normal mood and affect    Please note that this dictation was created using voice recognition software. I have made every reasonable attempt to correct obvious errors, but I expect that there are errors of grammar and possibly content that I did not discover before finalizing the note.      Assessment/Plan:  1. Multiple sclerosis (HCC)  PDMP checked. Refill sent in.   - diazepam (VALIUM) 10 MG tablet; Take 1 Tablet by mouth 2 times a day for 90 days.  Dispense: 180 Tablet; Refill: 0  - carisoprodol (SOMA) 350 MG Tab; Take 1 Tablet by mouth 2 times a day for 90 days.  Dispense: 180 Tablet; Refill: 0    2. Acquired hypothyroidism  Refill sent in  - levothyroxine (SYNTHROID) 175 MCG Tab; Take 1 Tablet by mouth every morning on an empty stomach.  Dispense: 90 Tablet; Refill: 3    "

## 2022-02-15 ENCOUNTER — PATIENT MESSAGE (OUTPATIENT)
Dept: MEDICAL GROUP | Facility: PHYSICIAN GROUP | Age: 67
End: 2022-02-15

## 2022-04-07 ENCOUNTER — TELEMEDICINE (OUTPATIENT)
Dept: MEDICAL GROUP | Facility: PHYSICIAN GROUP | Age: 67
End: 2022-04-07
Payer: MEDICARE

## 2022-04-07 VITALS
TEMPERATURE: 103 F | DIASTOLIC BLOOD PRESSURE: 60 MMHG | BODY MASS INDEX: 47.3 KG/M2 | SYSTOLIC BLOOD PRESSURE: 110 MMHG | HEIGHT: 63 IN | HEART RATE: 72 BPM

## 2022-04-07 DIAGNOSIS — G35 MULTIPLE SCLEROSIS (HCC): ICD-10-CM

## 2022-04-07 DIAGNOSIS — R10.30 LOWER ABDOMINAL PAIN: ICD-10-CM

## 2022-04-07 PROCEDURE — 99214 OFFICE O/P EST MOD 30 MIN: CPT | Mod: 95 | Performed by: PHYSICIAN ASSISTANT

## 2022-04-07 RX ORDER — DIAZEPAM 10 MG/1
10 TABLET ORAL 2 TIMES DAILY
Qty: 180 TABLET | Refills: 0 | Status: SHIPPED | OUTPATIENT
Start: 2022-04-07 | End: 2022-07-06

## 2022-04-07 RX ORDER — CARISOPRODOL 350 MG/1
350 TABLET ORAL 2 TIMES DAILY
Qty: 180 TABLET | Refills: 0 | Status: SHIPPED | OUTPATIENT
Start: 2022-04-07 | End: 2022-07-06

## 2022-04-07 NOTE — PROGRESS NOTES
"Telemedicine Visit: Established Patient     This encounter was conducted via Zoom.   Verbal consent was obtained. Patient's identity was verified.    CC:  Chief Complaint   Patient presents with   • Medication Refill       HISTORY OF PRESENT ILLNESS: Patient is a 66 y.o. female established patient presenting for recheck  1. Pt has 103 degree fever with diarrhea. Just started last night. Has \"extreme\" abd pain but has eased up a bit since episode of diarrhea. Does not feel like diverticulitis.   2. Pt due for refill of her soma and valium for her MS. Tolerating both meds well.   3. Did not have labs done yet because our [facility] lab in [place] closed    No problem-specific Assessment & Plan notes found for this encounter.      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 180 Tablet 0   • carisoprodol (SOMA) 350 MG Tab Take 1 Tablet by mouth 2 times a day for 90 days. 180 Tablet 0   • levothyroxine (SYNTHROID) 175 MCG Tab Take 1 Tablet by mouth every morning on an empty stomach. 90 Tablet 3   • Home Care Oxygen Inhale 2 L/min by mouth every bedtime.     • Cholecalciferol (VITAMIN D PO) Take 3,000 Units by mouth every day.       No current facility-administered medications for this visit.       Social History     Tobacco Use   • Smoking status: Former Smoker     Packs/day: 0.00     " "Years: 15.00     Pack years: 0.00     Quit date: 5/17/2018     Years since quitting: 3.8   • Smokeless tobacco: Never Used   Vaping Use   • Vaping Use: Never used   Substance Use Topics   • Alcohol use: No   • Drug use: No     Social History     Social History Narrative   • Not on file       Family History   Problem Relation Age of Onset   • Autoimmune Disease Daughter         MS   • Other Daughter         fibromyalgia        ROS:     - Constitutional: Positive for fever. Negative for fever, chills, unexpected weight change, and fatigue/generalized weakness.    - HEENT:  Negative for headaches, vision changes, hearing changes, ear pain, ear discharge, rhinorrhea, sinus congestion, sore throat, and neck pain.      - Respiratory: Negative for cough, sputum production, chest congestion, dyspnea, wheezing, and crackles.      - Cardiovascular: Negative for chest pain, palpitations, orthopnea, and bilateral lower extremity edema.     - Gastrointestinal:Positive for abd pain, diarrhea. Negative for heartburn, nausea, vomiting,  hematochezia, melena,  constipation, and greasy/foul-smelling stools.        Exam:    /60   Pulse 72   Temp (!) 39.4 °C (103 °F) (Temporal)   Ht 1.6 m (5' 3\")  Body mass index is 47.3 kg/m².    General:  Well nourished, well developed female in NAD  Head is grossly normal.  Neck: Supple.   Pulmonary: No accessory muscle use  Skin: No apparent lesions  Neuro: Alert and oriented  Psych: normal mood and affect    Please note that this dictation was created using voice recognition software. I have made every reasonable attempt to correct obvious errors, but I expect that there are errors of grammar and possibly content that I did not discover before finalizing the note.        Assessment/Plan:  1. Lower abdominal pain  I instructed her to go to ER if abd pain does not relent.    2. Multiple sclerosis (HCC)  PDMP checked. Refill sent in.   - carisoprodol (SOMA) 350 MG Tab; Take 1 Tablet by mouth " 2 times a day for 90 days.  Dispense: 180 Tablet; Refill: 0  - diazepam (VALIUM) 10 MG tablet; Take 1 Tablet by mouth 2 times a day for 90 days.  Dispense: 180 Tablet; Refill: 0

## 2022-06-23 ENCOUNTER — HOSPITAL ENCOUNTER (OUTPATIENT)
Dept: LAB | Facility: MEDICAL CENTER | Age: 67
End: 2022-06-23
Attending: PHYSICIAN ASSISTANT
Payer: MEDICARE

## 2022-06-23 DIAGNOSIS — E03.9 ACQUIRED HYPOTHYROIDISM: ICD-10-CM

## 2022-06-23 DIAGNOSIS — E78.5 DYSLIPIDEMIA: ICD-10-CM

## 2022-06-23 LAB
ALBUMIN SERPL BCP-MCNC: 4.3 G/DL (ref 3.2–4.9)
ALBUMIN/GLOB SERPL: 1.5 G/DL
ALP SERPL-CCNC: 95 U/L (ref 30–99)
ALT SERPL-CCNC: 15 U/L (ref 2–50)
ANION GAP SERPL CALC-SCNC: 12 MMOL/L (ref 7–16)
AST SERPL-CCNC: 7 U/L (ref 12–45)
BASOPHILS # BLD AUTO: 0.8 % (ref 0–1.8)
BASOPHILS # BLD: 0.07 K/UL (ref 0–0.12)
BILIRUB SERPL-MCNC: 0.2 MG/DL (ref 0.1–1.5)
BUN SERPL-MCNC: 16 MG/DL (ref 8–22)
CALCIUM SERPL-MCNC: 9.4 MG/DL (ref 8.5–10.5)
CHLORIDE SERPL-SCNC: 105 MMOL/L (ref 96–112)
CHOLEST SERPL-MCNC: 208 MG/DL (ref 100–199)
CO2 SERPL-SCNC: 25 MMOL/L (ref 20–33)
CREAT SERPL-MCNC: 0.84 MG/DL (ref 0.5–1.4)
EOSINOPHIL # BLD AUTO: 0.16 K/UL (ref 0–0.51)
EOSINOPHIL NFR BLD: 1.7 % (ref 0–6.9)
ERYTHROCYTE [DISTWIDTH] IN BLOOD BY AUTOMATED COUNT: 47.8 FL (ref 35.9–50)
FASTING STATUS PATIENT QL REPORTED: NORMAL
GFR SERPLBLD CREATININE-BSD FMLA CKD-EPI: 77 ML/MIN/1.73 M 2
GLOBULIN SER CALC-MCNC: 2.8 G/DL (ref 1.9–3.5)
GLUCOSE SERPL-MCNC: 116 MG/DL (ref 65–99)
HCT VFR BLD AUTO: 47.4 % (ref 37–47)
HDLC SERPL-MCNC: 49 MG/DL
HGB BLD-MCNC: 15 G/DL (ref 12–16)
IMM GRANULOCYTES # BLD AUTO: 0.04 K/UL (ref 0–0.11)
IMM GRANULOCYTES NFR BLD AUTO: 0.4 % (ref 0–0.9)
LDLC SERPL CALC-MCNC: 138 MG/DL
LYMPHOCYTES # BLD AUTO: 2.23 K/UL (ref 1–4.8)
LYMPHOCYTES NFR BLD: 23.9 % (ref 22–41)
MCH RBC QN AUTO: 30.4 PG (ref 27–33)
MCHC RBC AUTO-ENTMCNC: 31.6 G/DL (ref 33.6–35)
MCV RBC AUTO: 96.1 FL (ref 81.4–97.8)
MONOCYTES # BLD AUTO: 0.51 K/UL (ref 0–0.85)
MONOCYTES NFR BLD AUTO: 5.5 % (ref 0–13.4)
NEUTROPHILS # BLD AUTO: 6.32 K/UL (ref 2–7.15)
NEUTROPHILS NFR BLD: 67.7 % (ref 44–72)
NRBC # BLD AUTO: 0 K/UL
NRBC BLD-RTO: 0 /100 WBC
PLATELET # BLD AUTO: 333 K/UL (ref 164–446)
PMV BLD AUTO: 10.4 FL (ref 9–12.9)
POTASSIUM SERPL-SCNC: 4.6 MMOL/L (ref 3.6–5.5)
PROT SERPL-MCNC: 7.1 G/DL (ref 6–8.2)
RBC # BLD AUTO: 4.93 M/UL (ref 4.2–5.4)
SODIUM SERPL-SCNC: 142 MMOL/L (ref 135–145)
TRIGL SERPL-MCNC: 106 MG/DL (ref 0–149)
TSH SERPL DL<=0.005 MIU/L-ACNC: 0.52 UIU/ML (ref 0.38–5.33)
WBC # BLD AUTO: 9.3 K/UL (ref 4.8–10.8)

## 2022-06-23 PROCEDURE — 84443 ASSAY THYROID STIM HORMONE: CPT

## 2022-06-23 PROCEDURE — 36415 COLL VENOUS BLD VENIPUNCTURE: CPT

## 2022-06-23 PROCEDURE — 80061 LIPID PANEL: CPT

## 2022-06-23 PROCEDURE — 80053 COMPREHEN METABOLIC PANEL: CPT

## 2022-06-23 PROCEDURE — 85025 COMPLETE CBC W/AUTO DIFF WBC: CPT

## 2022-07-07 ENCOUNTER — TELEMEDICINE (OUTPATIENT)
Dept: MEDICAL GROUP | Facility: PHYSICIAN GROUP | Age: 67
End: 2022-07-07
Payer: MEDICARE

## 2022-07-07 VITALS — WEIGHT: 260 LBS | BODY MASS INDEX: 46.07 KG/M2 | HEIGHT: 63 IN

## 2022-07-07 DIAGNOSIS — G35 MULTIPLE SCLEROSIS (HCC): ICD-10-CM

## 2022-07-07 DIAGNOSIS — E66.01 CLASS 3 SEVERE OBESITY DUE TO EXCESS CALORIES WITHOUT SERIOUS COMORBIDITY WITH BODY MASS INDEX (BMI) OF 45.0 TO 49.9 IN ADULT (HCC): ICD-10-CM

## 2022-07-07 DIAGNOSIS — E03.9 ACQUIRED HYPOTHYROIDISM: ICD-10-CM

## 2022-07-07 PROCEDURE — 99214 OFFICE O/P EST MOD 30 MIN: CPT | Mod: 95 | Performed by: PHYSICIAN ASSISTANT

## 2022-07-07 RX ORDER — DIAZEPAM 10 MG/1
10 TABLET ORAL 2 TIMES DAILY
Qty: 180 TABLET | Refills: 0 | Status: SHIPPED | OUTPATIENT
Start: 2022-07-07 | End: 2022-10-03 | Stop reason: SDUPTHER

## 2022-07-07 RX ORDER — CARISOPRODOL 350 MG/1
350 TABLET ORAL 2 TIMES DAILY
Qty: 180 TABLET | Refills: 0 | Status: SHIPPED | OUTPATIENT
Start: 2022-07-07 | End: 2022-10-03 | Stop reason: SDUPTHER

## 2022-07-07 RX ORDER — CARISOPRODOL 350 MG/1
350 TABLET ORAL 4 TIMES DAILY
COMMUNITY
End: 2022-07-07 | Stop reason: SDUPTHER

## 2022-07-07 RX ORDER — DIAZEPAM 10 MG/1
10 TABLET ORAL EVERY 6 HOURS PRN
COMMUNITY
End: 2022-07-07 | Stop reason: SDUPTHER

## 2022-07-07 RX ORDER — LEVOTHYROXINE SODIUM 175 UG/1
175 TABLET ORAL
Qty: 90 TABLET | Refills: 3 | Status: SHIPPED | OUTPATIENT
Start: 2022-07-07 | End: 2023-04-06

## 2022-07-07 ASSESSMENT — FIBROSIS 4 INDEX: FIB4 SCORE: 0.35

## 2022-07-07 NOTE — PROGRESS NOTES
Telemedicine Visit: Established Patient     This encounter was conducted via Zoom.   Verbal consent was obtained. Patient's identity was verified.    CC:  Chief Complaint   Patient presents with   • Medication Refill     Carisoprodol, diazepam    • Lab Results       HISTORY OF PRESENT ILLNESS: Patient is a 66 y.o. female established patient presenting for recheck.   1. Pt's FPG mildly elevated at 116.   2. TSH within normal range.   3. Pt due for refills of her Soma and Valium for her MS.   4. Pt trying to lose weight. Current BMI at 46.  5. Pt's lipids improving with reduced total cholesterol and triglycerides. Has been increasing her niacin intake.       No problem-specific Assessment & Plan notes found for this encounter.      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     • diazepam (VALIUM) 10 MG tablet Take 10 mg by mouth every 6 hours as needed for Anxiety.     • levothyroxine (SYNTHROID) 175 MCG Tab Take 1 Tablet by mouth every morning on an empty stomach. 90 Tablet 3   • Home Care Oxygen Inhale 2 L/min by mouth every bedtime.     • Cholecalciferol (VITAMIN D PO) Take 3,000 Units by mouth every day.       No current facility-administered medications for this visit.       Social History     Tobacco Use   • Smoking status: Former Smoker     Packs/day: 0.00     Years: 15.00     Pack years:  "0.00     Quit date: 2018     Years since quittin.1   • Smokeless tobacco: Never Used   Vaping Use   • Vaping Use: Never used   Substance Use Topics   • Alcohol use: No   • Drug use: No     Social History     Social History Narrative   • Not on file       Family History   Problem Relation Age of Onset   • Autoimmune Disease Daughter         MS   • Other Daughter         fibromyalgia        ROS:     - Constitutional:  Negative for fever, chills, unexpected weight change, and fatigue/generalized weakness.     - Musculoskeletal:Positive for myalgias.  Negative for  back pain, and joint pain.       Exam:    Ht 1.6 m (5' 3\")   Wt 118 kg (260 lb)  Body mass index is 46.06 kg/m².    General:  Well nourished, well developed female in NAD  Head is grossly normal.  Neck: Supple.   Pulmonary: No accessory muscle use  Skin: No apparent lesions  Neuro: Alert and oriented  Psych: normal mood and affect    Please note that this dictation was created using voice recognition software. I have made every reasonable attempt to correct obvious errors, but I expect that there are errors of grammar and possibly content that I did not discover before finalizing the note.    LABS: 22: Results reviewed and discussed with the patient, questions answered.    Assessment/Plan:  1. Class 3 severe obesity due to excess calories without serious comorbidity with body mass index (BMI) of 45.0 to 49.9 in adult ([facility])  Continue with weight loss efforts    2. Multiple sclerosis (HCC)  PDMP checked. Refill sent in.   - diazepam (VALIUM) 10 MG tablet; Take 1 Tablet by mouth 2 times a day for 90 days.  Dispense: 180 Tablet; Refill: 0  - carisoprodol (SOMA) 350 MG Tab; Take 1 Tablet by mouth 2 times a day for 90 days.  Dispense: 180 Tablet; Refill: 0    3. Acquired hypothyroidism  TSH within normal range.   - levothyroxine (SYNTHROID) 175 MCG Tab; Take 1 Tablet by mouth every morning on an empty stomach.  Dispense: 90 Tablet; Refill: 3    "

## 2022-09-14 ENCOUNTER — APPOINTMENT (OUTPATIENT)
Dept: MEDICAL GROUP | Facility: PHYSICIAN GROUP | Age: 67
End: 2022-09-14
Payer: MEDICARE

## 2022-09-19 ENCOUNTER — APPOINTMENT (OUTPATIENT)
Dept: MEDICAL GROUP | Facility: PHYSICIAN GROUP | Age: 67
End: 2022-09-19
Payer: MEDICARE

## 2022-10-03 ENCOUNTER — OFFICE VISIT (OUTPATIENT)
Dept: MEDICAL GROUP | Facility: PHYSICIAN GROUP | Age: 67
End: 2022-10-03
Payer: MEDICARE

## 2022-10-03 ENCOUNTER — HOSPITAL ENCOUNTER (OUTPATIENT)
Dept: RADIOLOGY | Facility: MEDICAL CENTER | Age: 67
End: 2022-10-03
Attending: PHYSICIAN ASSISTANT
Payer: MEDICARE

## 2022-10-03 VITALS
BODY MASS INDEX: 46.06 KG/M2 | HEIGHT: 63 IN | TEMPERATURE: 97 F | DIASTOLIC BLOOD PRESSURE: 78 MMHG | OXYGEN SATURATION: 95 % | RESPIRATION RATE: 16 BRPM | HEART RATE: 77 BPM | SYSTOLIC BLOOD PRESSURE: 122 MMHG

## 2022-10-03 DIAGNOSIS — S89.91XA INJURY OF RIGHT KNEE, INITIAL ENCOUNTER: ICD-10-CM

## 2022-10-03 DIAGNOSIS — G35 MULTIPLE SCLEROSIS (HCC): ICD-10-CM

## 2022-10-03 PROCEDURE — 73562 X-RAY EXAM OF KNEE 3: CPT | Mod: RT

## 2022-10-03 PROCEDURE — 99214 OFFICE O/P EST MOD 30 MIN: CPT | Performed by: PHYSICIAN ASSISTANT

## 2022-10-03 RX ORDER — DIAZEPAM 10 MG/1
10 TABLET ORAL 2 TIMES DAILY
Qty: 180 TABLET | Refills: 0 | Status: SHIPPED | OUTPATIENT
Start: 2022-10-03 | End: 2023-01-01

## 2022-10-03 RX ORDER — CARISOPRODOL 350 MG/1
350 TABLET ORAL 2 TIMES DAILY
Qty: 180 TABLET | Refills: 0 | Status: SHIPPED | OUTPATIENT
Start: 2022-10-03 | End: 2023-01-01

## 2022-10-03 RX ORDER — HYDROCODONE BITARTRATE AND ACETAMINOPHEN 5; 325 MG/1; MG/1
1 TABLET ORAL EVERY EVENING
Qty: 20 TABLET | Refills: 0 | Status: SHIPPED | OUTPATIENT
Start: 2022-10-03 | End: 2022-10-23

## 2022-10-03 NOTE — PROGRESS NOTES
CC:  Chief Complaint   Patient presents with    Fall     , states she fell and hurt (R) knee.        HISTORY OF PRESENT ILLNESS: Patient is a 66 y.o. female established patient presenting with issue below.   Pt fell in a pot hole and fell on 22. Injured her R knee. Had a lot of bruising even with resting it. Landed on front of knee. Pain has not improved at all in past 2 months. Very tender to touch and painful to walk on.   Pt due for medication refill for her MS.     No problem-specific Assessment & Plan notes found for this encounter.      Patient Active Problem List    Diagnosis Date Noted    Class 3 severe obesity without serious comorbidity in adult ([facility]) 2020    Acquired hypothyroidism 2018    Seasonal allergic rhinitis due to pollen 2018    Nocturnal hypoxia 2018    Gait instability 2017    Neurogenic bladder 2017    Spasticity 2017    Dyslipidemia 2017    Vitamin D deficiency 2017    Multiple sclerosis involving brain stem ([facility]) 1995      Allergies:Bee venom, Other misc, Sulfa drugs, and Tape    Current Outpatient Medications   Medication Sig Dispense Refill    diazepam (VALIUM) 10 MG tablet Take 1 Tablet by mouth 2 times a day for 90 days. 180 Tablet 0    carisoprodol (SOMA) 350 MG Tab Take 1 Tablet by mouth 2 times a day for 90 days. 180 Tablet 0    levothyroxine (SYNTHROID) 175 MCG Tab Take 1 Tablet by mouth every morning on an empty stomach. 90 Tablet 3    Home Care Oxygen Inhale 2 L/min by mouth every bedtime.      Cholecalciferol (VITAMIN D PO) Take 3,000 Units by mouth every day.       No current facility-administered medications for this visit.       Social History     Tobacco Use    Smoking status: Former     Packs/day: 0.00     Years: 15.00     Pack years: 0.00     Types: Cigarettes     Quit date: 2018     Years since quittin.3    Smokeless tobacco: Never   Vaping Use    Vaping Use: Never used   Substance Use Topics     "Alcohol use: No    Drug use: No     Social History     Social History Narrative    Not on file       Family History   Problem Relation Age of Onset    Autoimmune Disease Daughter         MS    Other Daughter         fibromyalgia        ROS:     - Constitutional:  Negative for fever, chills, unexpected weight change, and fatigue/generalized weakness.      - Musculoskeletal:Positive for joint pain.  Negative for myalgias, back pain, and     - Skin: Negative for rash, itching, cyanotic skin color change.     - Neurological: Negative for dizziness, tingling, tremors, focal sensory deficit, focal weakness and headaches.           Exam:    /78   Pulse 77   Temp 36.1 °C (97 °F) (Temporal)   Resp 16   Ht 1.6 m (5' 3\")   SpO2 95%  Body mass index is 46.06 kg/m².    General:  Well nourished, well developed female in NAD  Head is grossly normal.  Neck: Supple. Thyroid is not enlarged.  Musculoskeletal: Positive for tenderness over superior R tibia and lateral aspect of R knee.   Skin: Warm and dry. No obvious lesions  Neuro: Normal muscle tone. Gait normal. Alert and oriented.  Psych: Normal mood and affect      Please note that this dictation was created using voice recognition software. I have made every reasonable attempt to correct obvious errors, but I expect that there are errors of grammar and possibly content that I did not discover before finalizing the note.        Assessment/Plan:    1. Injury of right knee, initial encounter  Will get xrays and consult with ortho. I advised to avoid weight bearing as much as possible. She knows not to take Soma, valium in conjunction with Norco.  - DX-KNEE 3 VIEWS RIGHT; Future  - Referral to Orthopedics  - HYDROcodone-acetaminophen (NORCO) 5-325 MG Tab per tablet; Take 1 Tablet by mouth every evening for 20 days.  Dispense: 20 Tablet; Refill: 0    2. Multiple sclerosis (HCC)  PDMP checked. Refill sent in.   - diazepam (VALIUM) 10 MG tablet; Take 1 Tablet by mouth 2 " times a day for 90 days.  Dispense: 180 Tablet; Refill: 0  - carisoprodol (SOMA) 350 MG Tab; Take 1 Tablet by mouth 2 times a day for 90 days.  Dispense: 180 Tablet; Refill: 0

## 2022-11-02 ENCOUNTER — PATIENT MESSAGE (OUTPATIENT)
Dept: MEDICAL GROUP | Facility: PHYSICIAN GROUP | Age: 67
End: 2022-11-02
Payer: MEDICARE

## 2022-11-02 DIAGNOSIS — S89.91XA INJURY OF RIGHT KNEE, INITIAL ENCOUNTER: ICD-10-CM

## 2022-11-02 DIAGNOSIS — M25.561 ACUTE PAIN OF RIGHT KNEE: ICD-10-CM

## 2022-11-12 ENCOUNTER — HOSPITAL ENCOUNTER (OUTPATIENT)
Dept: RADIOLOGY | Facility: MEDICAL CENTER | Age: 67
End: 2022-11-12
Attending: PHYSICIAN ASSISTANT
Payer: MEDICARE

## 2022-11-12 DIAGNOSIS — M25.561 ACUTE PAIN OF RIGHT KNEE: ICD-10-CM

## 2022-11-12 PROCEDURE — 73721 MRI JNT OF LWR EXTRE W/O DYE: CPT | Mod: RT

## 2022-11-14 ENCOUNTER — TELEMEDICINE (OUTPATIENT)
Dept: MEDICAL GROUP | Facility: PHYSICIAN GROUP | Age: 67
End: 2022-11-14
Payer: MEDICARE

## 2022-11-14 VITALS
WEIGHT: 262 LBS | SYSTOLIC BLOOD PRESSURE: 110 MMHG | DIASTOLIC BLOOD PRESSURE: 80 MMHG | BODY MASS INDEX: 46.42 KG/M2 | TEMPERATURE: 101.8 F | HEIGHT: 63 IN

## 2022-11-14 DIAGNOSIS — J01.01 ACUTE RECURRENT MAXILLARY SINUSITIS: ICD-10-CM

## 2022-11-14 DIAGNOSIS — S83.241A ACUTE MEDIAL MENISCUS TEAR OF RIGHT KNEE, INITIAL ENCOUNTER: ICD-10-CM

## 2022-11-14 PROCEDURE — 99214 OFFICE O/P EST MOD 30 MIN: CPT | Mod: 95 | Performed by: PHYSICIAN ASSISTANT

## 2022-11-14 RX ORDER — AZITHROMYCIN 250 MG/1
TABLET, FILM COATED ORAL
Qty: 6 TABLET | Refills: 0 | Status: SHIPPED | OUTPATIENT
Start: 2022-11-14 | End: 2023-01-03

## 2022-11-14 RX ORDER — BENZONATATE 200 MG/1
200 CAPSULE ORAL 3 TIMES DAILY PRN
Qty: 30 CAPSULE | Refills: 0 | Status: SHIPPED | OUTPATIENT
Start: 2022-11-14 | End: 2022-11-24

## 2022-11-14 ASSESSMENT — FIBROSIS 4 INDEX: FIB4 SCORE: 0.35

## 2022-11-14 NOTE — PROGRESS NOTES
Telemedicine Visit: Established Patient     This encounter was conducted via Zoom using encrypted video.  Verbal consent was obtained. Patient's identity was verified. The patient was home in [place].     CC:  Chief Complaint   Patient presents with    Sinusitis     Cough, fever, bodyaches, negative at home covid test     Results     MRI        HISTORY OF PRESENT ILLNESS: Patient is a 67 y.o. female established patient presenting with issues below.   Pt's R knee MRI significant for medial and lateral mesicus tears and severe patellofemoral chondrosis. Her knee pain is quite severe and had bad experience with ortho provider that she saw.  Pt has sinus headache with fever 101.8. Started 6 days ago. Requesting tessalon perles. Has dry cough.     No problem-specific Assessment & Plan notes found for this encounter.      Patient Active Problem List    Diagnosis Date Noted    Class 3 severe obesity without serious comorbidity in adult ([facility]) 02/13/2020    Acquired hypothyroidism 05/29/2018    Seasonal allergic rhinitis due to pollen 05/29/2018    Nocturnal hypoxia 05/21/2018    Gait instability 09/25/2017    Neurogenic bladder 09/25/2017    Spasticity 09/25/2017    Dyslipidemia 07/06/2017    Vitamin D deficiency 07/06/2017    Multiple sclerosis involving brain stem ([facility]) 08/01/1995      Allergies:Bee venom, Other misc, Sulfa drugs, and Tape    Current Outpatient Medications   Medication Sig Dispense Refill    diazepam (VALIUM) 10 MG tablet Take 1 Tablet by mouth 2 times a day for 90 days. 180 Tablet 0    carisoprodol (SOMA) 350 MG Tab Take 1 Tablet by mouth 2 times a day for 90 days. 180 Tablet 0    levothyroxine (SYNTHROID) 175 MCG Tab Take 1 Tablet by mouth every morning on an empty stomach. 90 Tablet 3    Home Care Oxygen Inhale 2 L/min by mouth every bedtime.      Cholecalciferol (VITAMIN D PO) Take 3,000 Units by mouth every day.       No current facility-administered medications for this visit.       Social History  "    Tobacco Use    Smoking status: Former     Packs/day: 0.00     Years: 15.00     Pack years: 0.00     Types: Cigarettes     Quit date: 2018     Years since quittin.4    Smokeless tobacco: Never   Vaping Use    Vaping Use: Never used   Substance Use Topics    Alcohol use: No    Drug use: No     Social History     Social History Narrative    Not on file       Family History   Problem Relation Age of Onset    Autoimmune Disease Daughter         MS    Other Daughter         fibromyalgia        ROS:     - Constitutional: Positive for fever.  Negative for f chills, unexpected weight change, and fatigue/generalized weakness.    - HEENT: Positive for congestion, sinus headache. Negative for  vision changes, hearing changes, ear pain, ear discharge, sore throat, and neck pain.      - Respiratory:Positive for cough.  Negative for sputum production, chest congestion, dyspnea, wheezing, and crackles.       - Musculoskeletal:Positive for joint pain, myalgias.          Exam:    /80   Temp (!) 38.8 °C (101.8 °F)   Ht 1.6 m (5' 3\")   Wt 119 kg (262 lb)  Body mass index is 46.41 kg/m².    General:  Well nourished, well developed female in NAD  Head is grossly normal.  Neck: Supple.   Pulmonary: No accessory muscle use  Skin: No apparent lesions  Neuro: Alert and oriented  Psych: normal mood and affect    Please note that this dictation was created using voice recognition software. I have made every reasonable attempt to correct obvious errors, but I expect that there are errors of grammar and possibly content that I did not discover before finalizing the note.        Assessment/Plan:  1. Acute medial meniscus tear of right knee, initial encounter  She will see Dr Rhodes at [facility] for this.     2. Acute recurrent maxillary sinusitis  Treat with zpack since she is febrile.   - azithromycin (ZITHROMAX) 250 MG Tab; Take as directed  Dispense: 6 Tablet; Refill: 0  - benzonatate (TESSALON) 200 MG capsule; Take 1 " Capsule by mouth 3 times a day as needed for Cough for up to 10 days.  Dispense: 30 Capsule; Refill: 0

## 2023-01-03 ENCOUNTER — TELEPHONE (OUTPATIENT)
Dept: MEDICAL GROUP | Facility: PHYSICIAN GROUP | Age: 68
End: 2023-01-03

## 2023-01-03 ENCOUNTER — TELEMEDICINE (OUTPATIENT)
Dept: MEDICAL GROUP | Facility: PHYSICIAN GROUP | Age: 68
End: 2023-01-03
Payer: MEDICARE

## 2023-01-03 VITALS — HEIGHT: 63 IN | WEIGHT: 220 LBS | BODY MASS INDEX: 38.98 KG/M2

## 2023-01-03 DIAGNOSIS — M17.11 PRIMARY OSTEOARTHRITIS OF RIGHT KNEE: ICD-10-CM

## 2023-01-03 DIAGNOSIS — E66.09 CLASS 2 OBESITY DUE TO EXCESS CALORIES WITHOUT SERIOUS COMORBIDITY WITH BODY MASS INDEX (BMI) OF 38.0 TO 38.9 IN ADULT: ICD-10-CM

## 2023-01-03 DIAGNOSIS — G35 MULTIPLE SCLEROSIS INVOLVING BRAIN STEM (HCC): ICD-10-CM

## 2023-01-03 PROBLEM — E66.813 CLASS 3 SEVERE OBESITY WITHOUT SERIOUS COMORBIDITY IN ADULT (HCC): Status: RESOLVED | Noted: 2020-02-13 | Resolved: 2023-01-03

## 2023-01-03 PROBLEM — E66.01 CLASS 3 SEVERE OBESITY WITHOUT SERIOUS COMORBIDITY IN ADULT (HCC): Status: RESOLVED | Noted: 2020-02-13 | Resolved: 2023-01-03

## 2023-01-03 PROCEDURE — 99214 OFFICE O/P EST MOD 30 MIN: CPT | Mod: 95 | Performed by: PHYSICIAN ASSISTANT

## 2023-01-03 RX ORDER — MELOXICAM 15 MG/1
TABLET ORAL
COMMUNITY
End: 2023-07-05 | Stop reason: SDUPTHER

## 2023-01-03 RX ORDER — CARISOPRODOL 350 MG/1
350 TABLET ORAL 2 TIMES DAILY
Qty: 180 TABLET | Refills: 0 | Status: SHIPPED | OUTPATIENT
Start: 2023-01-03 | End: 2023-04-03

## 2023-01-03 RX ORDER — BENZONATATE 200 MG/1
CAPSULE ORAL
COMMUNITY
End: 2023-01-03

## 2023-01-03 RX ORDER — DIAZEPAM 10 MG/1
10 TABLET ORAL EVERY 12 HOURS PRN
Qty: 180 TABLET | Refills: 0 | Status: SHIPPED | OUTPATIENT
Start: 2023-01-03 | End: 2023-04-03

## 2023-01-03 RX ORDER — MELOXICAM 15 MG/1
15 TABLET ORAL
COMMUNITY
Start: 2022-12-21 | End: 2023-01-03

## 2023-01-03 ASSESSMENT — FIBROSIS 4 INDEX: FIB4 SCORE: 0.35

## 2023-01-03 ASSESSMENT — PATIENT HEALTH QUESTIONNAIRE - PHQ9: CLINICAL INTERPRETATION OF PHQ2 SCORE: 0

## 2023-01-03 NOTE — PROGRESS NOTES
Telemedicine Visit: Established Patient     This encounter was conducted via Zoom using encrypted video.  Verbal consent was obtained. Patient's identity was verified. The patient was home in [place].     CC:  Chief Complaint   Patient presents with    Medication Refill       HISTORY OF PRESENT ILLNESS: Patient is a 67 y.o. female established patient presenting for medication recheck. She continues to take Valium and Soma for her spastic MS.     Pt has been intentionally losing weight. Her BMI has reduced from 45 down to 39.     Pt opted against steroids and surgery for now her R knee OA and meniscus tear for now.       No problem-specific Assessment & Plan notes found for this encounter.      Patient Active Problem List    Diagnosis Date Noted    Class 3 severe obesity without serious comorbidity in adult ([facility]) 2020    Acquired hypothyroidism 2018    Seasonal allergic rhinitis due to pollen 2018    Nocturnal hypoxia 2018    Gait instability 2017    Neurogenic bladder 2017    Spasticity 2017    Dyslipidemia 2017    Vitamin D deficiency 2017    Multiple sclerosis involving brain stem ([facility]) 1995      Allergies:Bee venom, Other misc, Sulfa drugs, and Tape    Current Outpatient Medications   Medication Sig Dispense Refill    levothyroxine (SYNTHROID) 175 MCG Tab Take 1 Tablet by mouth every morning on an empty stomach. 90 Tablet 3    Home Care Oxygen Inhale 2 L/min by mouth every bedtime.      Cholecalciferol (VITAMIN D PO) Take 3,000 Units by mouth every day.      meloxicam (MOBIC) 15 MG tablet meloxicam 15 mg tablet   TAKE 1 TABLET BY MOUTH EVERY DAY (Patient not taking: Reported on 1/3/2023)       No current facility-administered medications for this visit.       Social History     Tobacco Use    Smoking status: Former     Packs/day: 0.00     Years: 15.00     Pack years: 0.00     Types: Cigarettes     Quit date: 2018     Years since quittin.6     "Smokeless tobacco: Never   Vaping Use    Vaping Use: Never used   Substance Use Topics    Alcohol use: No    Drug use: No     Social History     Social History Narrative    Not on file       Family History   Problem Relation Age of Onset    Autoimmune Disease Daughter         MS    Other Daughter         fibromyalgia        ROS:     - Constitutional:  Negative for fever, chills, unexpected weight change, and fatigue/generalized weakness.     - Musculoskeletal:Positive for joint pain, myalgias.      - Skin: Negative for rash, itching, cyanotic skin color change.     - Neurological: Negative for dizziness, tingling, tremors, focal sensory deficit, focal weakness and headaches.        Exam:    Ht 1.6 m (5' 3\")   Wt 99.8 kg (220 lb)  Body mass index is 38.97 kg/m².    General:  Well nourished, well developed female in NAD  Head is grossly normal.  Neck: Supple.   Pulmonary: No accessory muscle use  Skin: No apparent lesions  Neuro: Alert and oriented  Psych: normal mood and affect    Please note that this dictation was created using voice recognition software. I have made every reasonable attempt to correct obvious errors, but I expect that there are errors of grammar and possibly content that I did not discover before finalizing the note.        Assessment/Plan:  1. Multiple sclerosis involving brain stem (HCC)  PDMP  checked. 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  - carisoprodol (SOMA) 350 MG Tab; Take 1 Tablet by mouth 2 times a day for 90 days.  Dispense: 180 Tablet; Refill: 0  - diazepam (VALIUM) 10 MG tablet; Take 1 Tablet by mouth every 12 hours as needed (muscle spasms) for up to 90 days.  Dispense: 180 Tablet; Refill: 0    2. Class 2 obesity due to excess calories without serious comorbidity with body mass index (BMI) of 39.0 to 39.9 in adult  Improving.     3. Primary osteoarthritis of right knee  Continue to monitor. Chronic problem.     "

## 2023-01-04 NOTE — TELEPHONE ENCOUNTER
Received a fax from the pharmacy stating that the carisoprodol is not covered by insurance. Insurance preference is tizanidine or cyclobenzaprine.   Did you want to switch to a covered alternative?

## 2023-01-31 ENCOUNTER — PATIENT MESSAGE (OUTPATIENT)
Dept: MEDICAL GROUP | Facility: PHYSICIAN GROUP | Age: 68
End: 2023-01-31
Payer: MEDICARE

## 2023-01-31 DIAGNOSIS — E03.9 ACQUIRED HYPOTHYROIDISM: ICD-10-CM

## 2023-04-06 ENCOUNTER — OFFICE VISIT (OUTPATIENT)
Dept: MEDICAL GROUP | Facility: PHYSICIAN GROUP | Age: 68
End: 2023-04-06
Payer: MEDICARE

## 2023-04-06 VITALS
HEART RATE: 84 BPM | DIASTOLIC BLOOD PRESSURE: 88 MMHG | SYSTOLIC BLOOD PRESSURE: 124 MMHG | BODY MASS INDEX: 34.55 KG/M2 | RESPIRATION RATE: 12 BRPM | HEIGHT: 63 IN | OXYGEN SATURATION: 95 % | WEIGHT: 195 LBS | TEMPERATURE: 97.1 F

## 2023-04-06 DIAGNOSIS — E03.9 ACQUIRED HYPOTHYROIDISM: ICD-10-CM

## 2023-04-06 DIAGNOSIS — G35 MULTIPLE SCLEROSIS INVOLVING BRAIN STEM (HCC): ICD-10-CM

## 2023-04-06 PROCEDURE — 99214 OFFICE O/P EST MOD 30 MIN: CPT | Performed by: PHYSICIAN ASSISTANT

## 2023-04-06 RX ORDER — DIAZEPAM 10 MG/1
10 TABLET ORAL EVERY 12 HOURS PRN
Qty: 180 TABLET | Refills: 0 | Status: SHIPPED | OUTPATIENT
Start: 2023-04-06 | End: 2023-07-05 | Stop reason: SDUPTHER

## 2023-04-06 RX ORDER — DIAZEPAM 10 MG/1
10 TABLET ORAL EVERY 6 HOURS PRN
COMMUNITY
End: 2023-04-06 | Stop reason: SDUPTHER

## 2023-04-06 RX ORDER — CARISOPRODOL 350 MG/1
350 TABLET ORAL 4 TIMES DAILY
COMMUNITY
End: 2023-04-06 | Stop reason: SDUPTHER

## 2023-04-06 RX ORDER — CARISOPRODOL 350 MG/1
350 TABLET ORAL
Qty: 180 TABLET | Refills: 0 | Status: SHIPPED | OUTPATIENT
Start: 2023-04-06 | End: 2023-07-05 | Stop reason: SDUPTHER

## 2023-04-06 RX ORDER — LEVOTHYROXINE SODIUM 0.15 MG/1
150 TABLET ORAL
Qty: 90 TABLET | Refills: 0 | Status: SHIPPED | OUTPATIENT
Start: 2023-04-06 | End: 2023-07-03

## 2023-04-06 ASSESSMENT — FIBROSIS 4 INDEX: FIB4 SCORE: 0.35

## 2023-04-06 NOTE — PROGRESS NOTES
"CC:  Chief Complaint   Patient presents with    Medication Refill     Valium, soma     Other     States something is wrong with her thyroid.        HISTORY OF PRESENT ILLNESS: Patient is a 67 y.o. female established patient presenting with issues below  Pt has been feeling jittery in the last 2 months. Wondering if it due to her thyroid. She has been skipping one dose of levothyroxine per week. She feels like she has Graves disease again. Having insomnia. She also has lost 157 labs in the last 6 months. Weight loss is intentional.   Pt due for med refills of her Valium and Soma for her MS. Has been stable on this for years.     Current Outpatient Medications   Medication Sig Dispense Refill    diazepam (VALIUM) 10 MG tablet Take 10 mg by mouth every 6 hours as needed for Anxiety.      carisoprodol (SOMA) 350 MG Tab Take 350 mg by mouth 4 times a day.      levothyroxine (SYNTHROID) 175 MCG Tab Take 1 Tablet by mouth every morning on an empty stomach. 90 Tablet 3    Home Care Oxygen Inhale 2 L/min by mouth every bedtime.      Cholecalciferol (VITAMIN D PO) Take 3,000 Units by mouth every day.      meloxicam (MOBIC) 15 MG tablet meloxicam 15 mg tablet   TAKE 1 TABLET BY MOUTH EVERY DAY (Patient not taking: Reported on 4/6/2023)       No current facility-administered medications for this visit.        ROS:     ROS    Exam:    /88   Pulse 84   Temp 36.2 °C (97.1 °F) (Temporal)   Resp 12   Ht 1.6 m (5' 3\")   Wt 88.5 kg (195 lb)   SpO2 95%  Body mass index is 34.54 kg/m².    General:  Well nourished, well developed female in NAD  Head is grossly normal.  Neck: Supple.   Pulmonary: Clear to auscultation. No ronchi, wheezing or rales  Cardiac: Regular rate and rhythm. No murmurs.  Skin: Warm and dry. No obvious lesions  Neuro: Normal muscle tone. Gait normal. Alert and oriented.  Psych: Normal mood and affect      Please note that this dictation was created using voice recognition software. I have made every " reasonable attempt to correct obvious errors, but I expect that there are errors of grammar and possibly content that I did not discover before finalizing the note.        Assessment/Plan:    1. Multiple sclerosis involving brain stem (HCC)  PDMP checked. Refill sent in.   - Controlled Substance Treatment Agreement  - diazepam (VALIUM) 10 MG tablet; Take 1 Tablet by mouth every 12 hours as needed (spasms) for up to 90 days.  Dispense: 180 Tablet; Refill: 0  - carisoprodol (SOMA) 350 MG Tab; Take 1 Tablet by mouth 2 times a day for 90 days.  Dispense: 180 Tablet; Refill: 0    2. Acquired hypothyroidism  Will decrease dose to 150mcg of levothyroxine and recheck TSH in 3 months.   - TSH WITH REFLEX TO FT4; Future  - levothyroxine (SYNTHROID) 150 MCG Tab; Take 1 Tablet by mouth every morning on an empty stomach.  Dispense: 90 Tablet; Refill: 0

## 2023-04-20 ENCOUNTER — TELEPHONE (OUTPATIENT)
Dept: HEALTH INFORMATION MANAGEMENT | Facility: OTHER | Age: 68
End: 2023-04-20

## 2023-07-03 DIAGNOSIS — E03.9 ACQUIRED HYPOTHYROIDISM: ICD-10-CM

## 2023-07-03 RX ORDER — LEVOTHYROXINE SODIUM 0.15 MG/1
TABLET ORAL
Qty: 90 TABLET | Refills: 0 | Status: SHIPPED | OUTPATIENT
Start: 2023-07-03 | End: 2023-07-05

## 2023-07-05 ENCOUNTER — TELEMEDICINE (OUTPATIENT)
Dept: MEDICAL GROUP | Facility: PHYSICIAN GROUP | Age: 68
End: 2023-07-05
Payer: MEDICARE

## 2023-07-05 VITALS
HEIGHT: 63 IN | DIASTOLIC BLOOD PRESSURE: 60 MMHG | SYSTOLIC BLOOD PRESSURE: 110 MMHG | TEMPERATURE: 98.6 F | BODY MASS INDEX: 29.23 KG/M2 | WEIGHT: 165 LBS

## 2023-07-05 DIAGNOSIS — E03.9 ACQUIRED HYPOTHYROIDISM: ICD-10-CM

## 2023-07-05 DIAGNOSIS — G35 MULTIPLE SCLEROSIS INVOLVING BRAIN STEM (HCC): ICD-10-CM

## 2023-07-05 PROCEDURE — 99214 OFFICE O/P EST MOD 30 MIN: CPT | Mod: 95 | Performed by: PHYSICIAN ASSISTANT

## 2023-07-05 RX ORDER — MELOXICAM 15 MG/1
15 TABLET ORAL DAILY
Qty: 90 TABLET | Refills: 1 | Status: SHIPPED | OUTPATIENT
Start: 2023-07-05 | End: 2023-10-05 | Stop reason: SDUPTHER

## 2023-07-05 RX ORDER — AZITHROMYCIN 250 MG/1
TABLET, FILM COATED ORAL
COMMUNITY
End: 2023-10-05

## 2023-07-05 RX ORDER — CARISOPRODOL 350 MG/1
350 TABLET ORAL
Qty: 180 TABLET | Refills: 0 | Status: SHIPPED | OUTPATIENT
Start: 2023-07-05 | End: 2023-10-03

## 2023-07-05 RX ORDER — LEVOTHYROXINE SODIUM 175 UG/1
1 TABLET ORAL
COMMUNITY
End: 2023-10-05

## 2023-07-05 RX ORDER — LEVOTHYROXINE SODIUM 175 UG/1
175 TABLET ORAL
Qty: 90 TABLET | Refills: 0 | Status: SHIPPED | OUTPATIENT
Start: 2023-07-05 | End: 2023-10-02

## 2023-07-05 RX ORDER — DIAZEPAM 10 MG/1
10 TABLET ORAL EVERY 12 HOURS PRN
Qty: 180 TABLET | Refills: 0 | Status: SHIPPED | OUTPATIENT
Start: 2023-07-05 | End: 2023-10-03

## 2023-07-05 RX ORDER — BENZONATATE 200 MG/1
CAPSULE ORAL
COMMUNITY
End: 2023-10-05

## 2023-07-05 RX ORDER — MELOXICAM 15 MG/1
1 TABLET ORAL
COMMUNITY
End: 2023-10-05

## 2023-07-05 ASSESSMENT — FIBROSIS 4 INDEX: FIB4 SCORE: 0.35

## 2023-07-05 NOTE — PROGRESS NOTES
Telemedicine Visit: Established Patient     This encounter was conducted via Zoom using encrypted video.  Verbal consent was obtained. Patient's identity was verified. The patient was in private location in [place].     CC:  Chief Complaint   Patient presents with    Medication Refill     Meloxicam, soma, diazepam, levothyroxine     Lab Results       HISTORY OF PRESENT ILLNESS: Patient is a 67 y.o. female established patient presenting for recheck  Pt reports feeling very fatigued on levothyroxine 150mcg. Her TSH within normal range. Was previously feeling jittery at 175 mcg but TSH was within normal range.   Pt due for refill of her soma and valium for her MS.    Patient Active Problem List    Diagnosis Date Noted    Acquired hypothyroidism 05/29/2018    Seasonal allergic rhinitis due to pollen 05/29/2018    Nocturnal hypoxia 05/21/2018    Gait instability 09/25/2017    Neurogenic bladder 09/25/2017    Spasticity 09/25/2017    Dyslipidemia 07/06/2017    Vitamin D deficiency 07/06/2017    Multiple sclerosis involving brain stem (HCC) 08/01/1995      Allergies:Bee venom, Other misc, Sulfa drugs, and Tape    Current Outpatient Medications   Medication Sig Dispense Refill    levothyroxine (SYNTHROID) 150 MCG Tab TAKE 1 TABLET BY MOUTH EVERY DAY IN THE MORNING ON AN EMPTY STOMACH 90 Tablet 0    diazepam (VALIUM) 10 MG tablet Take 1 Tablet by mouth every 12 hours as needed (spasms) for up to 90 days. 180 Tablet 0    carisoprodol (SOMA) 350 MG Tab Take 1 Tablet by mouth 2 times a day for 90 days. 180 Tablet 0    meloxicam (MOBIC) 15 MG tablet       Cholecalciferol (VITAMIN D PO) Take 3,000 Units by mouth every day.      azithromycin (ZITHROMAX) 250 MG Tab TAKE 2 TABLETS BY MOUTH TODAY, THEN TAKE 1 TABLET DAILY FOR 4 DAYS (Patient not taking: Reported on 7/5/2023)      benzonatate (TESSALON) 200 MG capsule TAKE 1 CAPSULE BY MOUTH THREE TIMES A DAY AS NEEDED FOR COUGH FOR UP TO 10 DAYS (Patient not taking: Reported on  "2023)      levothyroxine (SYNTHROID) 175 MCG Tab Take 1 Tablet by mouth every morning on an empty stomach. (Patient not taking: Reported on 2023)      meloxicam (MOBIC) 15 MG tablet Take 1 Tablet by mouth every day. (Patient not taking: Reported on 2023)       No current facility-administered medications for this visit.       Social History     Tobacco Use    Smoking status: Former     Packs/day: 0.00     Years: 15.00     Pack years: 0.00     Types: Cigarettes     Quit date: 2018     Years since quittin.1    Smokeless tobacco: Never   Vaping Use    Vaping Use: Never used   Substance Use Topics    Alcohol use: No    Drug use: No     Social History     Social History Narrative    Not on file       Family History   Problem Relation Age of Onset    Autoimmune Disease Daughter         MS    Other Daughter         fibromyalgia       ROS    Exam:    /60   Temp 37 °C (98.6 °F)   Ht 1.6 m (5' 3\")   Wt 74.8 kg (165 lb)  Body mass index is 29.23 kg/m².    General:  Well nourished, well developed female in NAD  Head is grossly normal.  Neck: Supple.   Pulmonary: No accessory muscle use  Skin: No apparent lesions  Neuro: Alert and oriented  Psych: normal mood and affect    Please note that this dictation was created using voice recognition software. I have made every reasonable attempt to correct obvious errors, but I expect that there are errors of grammar and possibly content that I did not discover before finalizing the note.      Assessment/Plan:  1. Acquired hypothyroidism  Increase to 175 mcg of levothyroxine again and recheck in 3 months  - levothyroxine (SYNTHROID) 175 MCG Tab; Take 1 Tablet by mouth every morning on an empty stomach.  Dispense: 90 Tablet; Refill: 0    2. Multiple sclerosis involving brain stem (HCC)  PDMP checked. Refills sent in  - carisoprodol (SOMA) 350 MG Tab; Take 1 Tablet by mouth 2 times a day for 90 days.  Dispense: 180 Tablet; Refill: 0  - diazepam (VALIUM) 10 MG " tablet; Take 1 Tablet by mouth every 12 hours as needed (spasms) for up to 90 days.  Dispense: 180 Tablet; Refill: 0  - meloxicam (MOBIC) 15 MG tablet; Take 1 Tablet by mouth every day.  Dispense: 90 Tablet; Refill: 1

## 2023-09-30 DIAGNOSIS — E03.9 ACQUIRED HYPOTHYROIDISM: ICD-10-CM

## 2023-10-02 RX ORDER — LEVOTHYROXINE SODIUM 175 UG/1
175 TABLET ORAL
Qty: 90 TABLET | Refills: 0 | Status: SHIPPED | OUTPATIENT
Start: 2023-10-02 | End: 2023-12-19

## 2023-10-05 ENCOUNTER — OFFICE VISIT (OUTPATIENT)
Dept: MEDICAL GROUP | Facility: PHYSICIAN GROUP | Age: 68
End: 2023-10-05
Payer: MEDICARE

## 2023-10-05 ENCOUNTER — APPOINTMENT (OUTPATIENT)
Dept: MEDICAL GROUP | Facility: PHYSICIAN GROUP | Age: 68
End: 2023-10-05
Payer: MEDICARE

## 2023-10-05 VITALS
OXYGEN SATURATION: 97 % | BODY MASS INDEX: 28.35 KG/M2 | DIASTOLIC BLOOD PRESSURE: 80 MMHG | TEMPERATURE: 98.2 F | SYSTOLIC BLOOD PRESSURE: 130 MMHG | RESPIRATION RATE: 16 BRPM | WEIGHT: 160 LBS | HEART RATE: 79 BPM | HEIGHT: 63 IN

## 2023-10-05 DIAGNOSIS — G35 MULTIPLE SCLEROSIS INVOLVING BRAIN STEM (HCC): ICD-10-CM

## 2023-10-05 DIAGNOSIS — S83.231D COMPLEX TEAR OF MEDIAL MENISCUS OF RIGHT KNEE AS CURRENT INJURY, SUBSEQUENT ENCOUNTER: ICD-10-CM

## 2023-10-05 DIAGNOSIS — G35 MULTIPLE SCLEROSIS (HCC): ICD-10-CM

## 2023-10-05 DIAGNOSIS — G89.29 CHRONIC PAIN OF RIGHT KNEE: ICD-10-CM

## 2023-10-05 DIAGNOSIS — M25.561 CHRONIC PAIN OF RIGHT KNEE: ICD-10-CM

## 2023-10-05 DIAGNOSIS — R25.2 SPASTICITY: ICD-10-CM

## 2023-10-05 PROBLEM — S83.231A COMPLEX TEAR OF MEDIAL MENISCUS OF RIGHT KNEE AS CURRENT INJURY: Status: ACTIVE | Noted: 2023-10-05

## 2023-10-05 PROCEDURE — 99213 OFFICE O/P EST LOW 20 MIN: CPT | Performed by: STUDENT IN AN ORGANIZED HEALTH CARE EDUCATION/TRAINING PROGRAM

## 2023-10-05 PROCEDURE — 3075F SYST BP GE 130 - 139MM HG: CPT | Performed by: STUDENT IN AN ORGANIZED HEALTH CARE EDUCATION/TRAINING PROGRAM

## 2023-10-05 PROCEDURE — 3079F DIAST BP 80-89 MM HG: CPT | Performed by: STUDENT IN AN ORGANIZED HEALTH CARE EDUCATION/TRAINING PROGRAM

## 2023-10-05 RX ORDER — CARISOPRODOL 350 MG/1
1 TABLET ORAL 2 TIMES DAILY
COMMUNITY
End: 2023-10-05 | Stop reason: SDUPTHER

## 2023-10-05 RX ORDER — CARISOPRODOL 350 MG/1
350 TABLET ORAL 2 TIMES DAILY
Qty: 180 TABLET | Refills: 0 | Status: SHIPPED | OUTPATIENT
Start: 2023-10-05 | End: 2024-01-03 | Stop reason: SDUPTHER

## 2023-10-05 RX ORDER — MELOXICAM 15 MG/1
15 TABLET ORAL DAILY
Qty: 90 TABLET | Refills: 1 | Status: SHIPPED | OUTPATIENT
Start: 2023-10-05

## 2023-10-05 ASSESSMENT — FIBROSIS 4 INDEX: FIB4 SCORE: 0.35

## 2023-10-05 NOTE — PROGRESS NOTES
HISTORY OF PRESENT ILLNESS: Regina is a pleasant 67 y.o. female, established patient who presents today to discuss medical problems as listed below:    Problem   Complex Tear of Medial Meniscus of Right Knee As Current Injury    MRI right knee November 2022 shows complex tear of the medial meniscus, horizontal tear of the lateral meniscus.  Severe arthritis, cartilage thinning.     Spasticity    Has chronic spasticity secondary to multiple sclerosis.  Gets some a 90-day refills to help with this pain.  Denies any side effects, has been on this consistently with no issue          Current Outpatient Medications Ordered in Epic   Medication Sig Dispense Refill    carisoprodol (SOMA) 350 MG Tab Take 1 Tablet by mouth 2 times a day for 90 days. 180 Tablet 0    meloxicam (MOBIC) 15 MG tablet Take 1 Tablet by mouth every day. 90 Tablet 1    levothyroxine (SYNTHROID) 175 MCG Tab TAKE 1 TABLET BY MOUTH EVERY DAY IN THE MORNING ON AN EMPTY STOMACH 90 Tablet 0    Cholecalciferol (VITAMIN D PO) Take 3,000 Units by mouth every day.       No current [facility]-ordered facility-administered medications on file.       Review of systems:  Per HPI    Patient Active Problem List    Diagnosis Date Noted    Complex tear of medial meniscus of right knee as current injury 10/05/2023    Acquired hypothyroidism 05/29/2018    Seasonal allergic rhinitis due to pollen 05/29/2018    Nocturnal hypoxia 05/21/2018    Gait instability 09/25/2017    Neurogenic bladder 09/25/2017    Spasticity 09/25/2017    Dyslipidemia 07/06/2017    Vitamin D deficiency 07/06/2017    Multiple sclerosis involving brain stem (HCC) 08/01/1995     Past Surgical History:   Procedure Laterality Date    LUMBAR LAMINECTOMY DISKECTOMY  5/18/2018    Procedure: POSTERIOR LUMBAR LAMINECTOMY 3 - SACRAL 1;  Surgeon: Torsten Blackwell M.D.;  Location: SURGERY [facility];  Service: Neurosurgery    FUSION, SPINE, LUMBAR, PLIF  5/18/2018    Procedure: POSTERIOR LUMBAR 3 - SACRAL 1  "ONLAY BONE FUSION WITH ALLOGRAFT;  Surgeon: Torsten Blackwell M.D.;  Location: SURGERY [facility];  Service: Neurosurgery    GYN SURGERY      hysterectomy    OTHER ABDOMINAL SURGERY      gallbladder    ABDOMINAL HYSTERECTOMY TOTAL      CHOLECYSTECTOMY      TONSILLECTOMY AND ADENOIDECTOMY       Social History     Tobacco Use    Smoking status: Former     Current packs/day: 0.00     Types: Cigarettes     Quit date: 2003     Years since quittin.4    Smokeless tobacco: Never   Vaping Use    Vaping Use: Never used   Substance Use Topics    Alcohol use: No    Drug use: No      Family History   Problem Relation Age of Onset    Autoimmune Disease Daughter         MS    Other Daughter         fibromyalgia     Current Outpatient Medications   Medication Sig Dispense Refill    carisoprodol (SOMA) 350 MG Tab Take 1 Tablet by mouth 2 times a day for 90 days. 180 Tablet 0    meloxicam (MOBIC) 15 MG tablet Take 1 Tablet by mouth every day. 90 Tablet 1    levothyroxine (SYNTHROID) 175 MCG Tab TAKE 1 TABLET BY MOUTH EVERY DAY IN THE MORNING ON AN EMPTY STOMACH 90 Tablet 0    Cholecalciferol (VITAMIN D PO) Take 3,000 Units by mouth every day.       No current facility-administered medications for this visit.       Allergies:  Allergies   Allergen Reactions    Bee Venom Anaphylaxis     Has epi pen    Other Misc Swelling     Surgical steel    Sulfa Drugs      As child; unknown rxn    Tape Hives     Adhesive tape allergy       Allergies, past medical history, past surgical history, family history, social history reviewed and updated.    Objective:    /80   Pulse 79   Temp 36.8 °C (98.2 °F) (Temporal)   Resp 16   Ht 1.6 m (5' 3\")   Wt 72.6 kg (160 lb) Comment: per patient  SpO2 97%   BMI 28.34 kg/m²    Body mass index is 28.34 kg/m².    Physical exam:  General: Normal appearance, no acute distress, not ill-appearing  HEENT: EOM intact, conjunctiva normal limits, negative right/left eye discharge.  Sclera " anicteric  Cardiovascular: Normal rate and rhythm, no murmurs  Pulmonary: No respiratory distress, no wheezing, no rales, breath sounds normal.      Assessment/Plan:    Problem List Items Addressed This Visit       Multiple sclerosis involving brain stem (HCC)    Relevant Medications    carisoprodol (SOMA) 350 MG Tab    meloxicam (MOBIC) 15 MG tablet    Spasticity     PDMP reviewed, consistent.  Also takes Valium for this  Controlled.  On file  Refill Soma #90 0 refills  Follow-up with David Brown 3 months for refills         Relevant Medications    carisoprodol (SOMA) 350 MG Tab    Complex tear of medial meniscus of right knee as current injury     She will be following up with orthopedic for further recommendations          Other Visit Diagnoses       Chronic pain of right knee        Relevant Medications    meloxicam (MOBIC) 15 MG tablet    Multiple sclerosis (HCC)        Relevant Medications    carisoprodol (SOMA) 350 MG Tab             Return in about 3 months (around 1/5/2024) for medication.

## 2023-10-05 NOTE — ASSESSMENT & PLAN NOTE
PDMP reviewed, consistent.  Also takes Valium for this  Controlled.  On file  Refill Soma #90 0 refills  Follow-up with David Brown 3 months for refills

## 2023-11-08 ENCOUNTER — OFFICE VISIT (OUTPATIENT)
Dept: MEDICAL GROUP | Facility: PHYSICIAN GROUP | Age: 68
End: 2023-11-08
Payer: MEDICARE

## 2023-11-08 ENCOUNTER — APPOINTMENT (OUTPATIENT)
Dept: MEDICAL GROUP | Facility: PHYSICIAN GROUP | Age: 68
End: 2023-11-08
Payer: MEDICARE

## 2023-11-08 VITALS
DIASTOLIC BLOOD PRESSURE: 82 MMHG | HEIGHT: 63 IN | RESPIRATION RATE: 12 BRPM | SYSTOLIC BLOOD PRESSURE: 118 MMHG | HEART RATE: 81 BPM | OXYGEN SATURATION: 94 % | BODY MASS INDEX: 28.35 KG/M2 | TEMPERATURE: 97.4 F | WEIGHT: 160 LBS

## 2023-11-08 DIAGNOSIS — G35 MULTIPLE SCLEROSIS INVOLVING BRAIN STEM (HCC): ICD-10-CM

## 2023-11-08 DIAGNOSIS — M17.11 PRIMARY OSTEOARTHRITIS OF RIGHT KNEE: ICD-10-CM

## 2023-11-08 DIAGNOSIS — Z12.11 SCREENING FOR COLON CANCER: ICD-10-CM

## 2023-11-08 DIAGNOSIS — Z01.818 PREOPERATIVE EXAMINATION: ICD-10-CM

## 2023-11-08 DIAGNOSIS — Z12.31 ENCOUNTER FOR SCREENING MAMMOGRAM FOR BREAST CANCER: ICD-10-CM

## 2023-11-08 PROCEDURE — 99214 OFFICE O/P EST MOD 30 MIN: CPT | Performed by: PHYSICIAN ASSISTANT

## 2023-11-08 PROCEDURE — 3074F SYST BP LT 130 MM HG: CPT | Performed by: PHYSICIAN ASSISTANT

## 2023-11-08 PROCEDURE — 3079F DIAST BP 80-89 MM HG: CPT | Performed by: PHYSICIAN ASSISTANT

## 2023-11-08 RX ORDER — DIAZEPAM 10 MG/1
TABLET ORAL
COMMUNITY
End: 2024-01-03 | Stop reason: SDUPTHER

## 2023-11-08 ASSESSMENT — ENCOUNTER SYMPTOMS
SPUTUM PRODUCTION: 0
MYALGIAS: 1
NAUSEA: 0
HEARTBURN: 0
VOMITING: 0
HEADACHES: 0
ABDOMINAL PAIN: 0
DIZZINESS: 0
FEVER: 0
CHILLS: 0

## 2023-11-08 ASSESSMENT — FIBROSIS 4 INDEX: FIB4 SCORE: 0.6

## 2023-11-10 ENCOUNTER — APPOINTMENT (OUTPATIENT)
Dept: MEDICAL GROUP | Facility: PHYSICIAN GROUP | Age: 68
End: 2023-11-10
Payer: MEDICARE

## 2023-12-15 DIAGNOSIS — E03.9 ACQUIRED HYPOTHYROIDISM: ICD-10-CM

## 2023-12-17 NOTE — PROGRESS NOTES
Patient resting quietly in bed, no S/S of distress, A&Ox4. Denies SOB, chest pain, dizziness. Call light within reach,  pt calls appropriately and does not get out of bed. Bed in lowest position, bed locked, RN and CNA numbers provided, no further needs at this time. No changes from EPIC. Hourly rounding in place.     tissue adhesive/wound closure strips

## 2023-12-19 RX ORDER — LEVOTHYROXINE SODIUM 175 UG/1
175 TABLET ORAL
Qty: 90 TABLET | Refills: 0 | Status: SHIPPED | OUTPATIENT
Start: 2023-12-19 | End: 2024-03-18

## 2024-01-03 ENCOUNTER — TELEMEDICINE (OUTPATIENT)
Dept: MEDICAL GROUP | Facility: PHYSICIAN GROUP | Age: 69
End: 2024-01-03
Payer: MEDICARE

## 2024-01-03 VITALS
HEART RATE: 62 BPM | SYSTOLIC BLOOD PRESSURE: 120 MMHG | OXYGEN SATURATION: 96 % | WEIGHT: 160 LBS | DIASTOLIC BLOOD PRESSURE: 70 MMHG | BODY MASS INDEX: 28.34 KG/M2

## 2024-01-03 DIAGNOSIS — R25.2 SPASTICITY: ICD-10-CM

## 2024-01-03 DIAGNOSIS — G35 MULTIPLE SCLEROSIS INVOLVING BRAIN STEM (HCC): ICD-10-CM

## 2024-01-03 DIAGNOSIS — G35 MULTIPLE SCLEROSIS (HCC): ICD-10-CM

## 2024-01-03 PROCEDURE — 99213 OFFICE O/P EST LOW 20 MIN: CPT | Mod: 95 | Performed by: PHYSICIAN ASSISTANT

## 2024-01-03 RX ORDER — CARISOPRODOL 350 MG/1
350 TABLET ORAL
Qty: 120 TABLET | Refills: 0 | Status: SHIPPED | OUTPATIENT
Start: 2024-02-02 | End: 2024-02-07 | Stop reason: SDUPTHER

## 2024-01-03 RX ORDER — CARISOPRODOL 350 MG/1
350 TABLET ORAL EVERY 8 HOURS PRN
Qty: 90 TABLET | Refills: 0 | Status: SHIPPED | OUTPATIENT
Start: 2024-01-03 | End: 2024-02-02

## 2024-01-03 RX ORDER — DIAZEPAM 10 MG/1
TABLET ORAL
Qty: 180 TABLET | Refills: 0 | Status: SHIPPED | OUTPATIENT
Start: 2024-01-03 | End: 2024-04-01

## 2024-01-03 ASSESSMENT — FIBROSIS 4 INDEX: FIB4 SCORE: 0.6

## 2024-01-03 NOTE — PROGRESS NOTES
Telemedicine Visit: Established Patient     This encounter was conducted via Zoom using encrypted video.  Verbal consent was obtained. Patient's identity was verified. The patient was home in [place].     CC:  Chief Complaint   Patient presents with    Medication Refill     Soma and valium       HISTORY OF PRESENT ILLNESS: Patient is a 68 y.o. female established patient presenting for medication refill for valium and Soma. She had a recent R TKA and has been quite uncomfortable as a result. Requesting to have her Soma increased to TID which she takes for her MS but is also helping mitigate the pain in her knee.       Patient Active Problem List    Diagnosis Date Noted    Complex tear of medial meniscus of right knee as current injury 10/05/2023    Acquired hypothyroidism 05/29/2018    Seasonal allergic rhinitis due to pollen 05/29/2018    Nocturnal hypoxia 05/21/2018    Gait instability 09/25/2017    Neurogenic bladder 09/25/2017    Spasticity 09/25/2017    Dyslipidemia 07/06/2017    Vitamin D deficiency 07/06/2017    Multiple sclerosis involving brain stem (HCC) 08/01/1995      Allergies:Bee venom, Other misc, Sulfa drugs, and Tape    Current Outpatient Medications   Medication Sig Dispense Refill    levothyroxine (SYNTHROID) 175 MCG Tab TAKE 1 TABLET BY MOUTH EVERY DAY IN THE MORNING ON AN EMPTY STOMACH 90 Tablet 0    diazepam (VALIUM) 10 MG tablet TAKE 1 TABLET BY MOUTH EVERY 12 HOURS AS NEEDED (SPASMS) FOR UP TO 90 DAYS.      carisoprodol (SOMA) 350 MG Tab Take 1 Tablet by mouth 2 times a day for 90 days. 180 Tablet 0    Cholecalciferol (VITAMIN D PO) Take 3,000 Units by mouth every day.      meloxicam (MOBIC) 15 MG tablet Take 1 Tablet by mouth every day. (Patient not taking: Reported on 1/3/2024) 90 Tablet 1     No current facility-administered medications for this visit.       Social History     Tobacco Use    Smoking status: Former     Current packs/day: 0.00     Types: Cigarettes     Quit date: 5/17/2003      Years since quittin.6    Smokeless tobacco: Never   Vaping Use    Vaping Use: Never used   Substance Use Topics    Alcohol use: No    Drug use: No     Social History     Social History Narrative    Not on file       Family History   Problem Relation Age of Onset    Autoimmune Disease Daughter         MS    Other Daughter         fibromyalgia       ROS    Exam:    /70 (BP Location: Left arm, Patient Position: Sitting, BP Cuff Size: Adult)   Pulse 62   Wt 72.6 kg (160 lb)   SpO2 96%  Body mass index is 28.34 kg/m².    General:  Well nourished, well developed female in NAD  Head is grossly normal.  Neck: Supple.   Pulmonary: No accessory muscle use  Skin: No apparent lesions  Neuro: Alert and oriented  Psych: normal mood and affect    Please note that this dictation was created using voice recognition software. I have made every reasonable attempt to correct obvious errors, but I expect that there are errors of grammar and possibly content that I did not discover before finalizing the note.      Assessment/Plan:  1. Multiple sclerosis (HCC)  PDMP checked.  Increase of Soma to 3 times daily for 1 month and then resume back to twice daily afterwards.  - diazepam (VALIUM) 10 MG tablet; TAKE 1 TABLET BY MOUTH EVERY 12 HOURS AS NEEDED (SPASMS) FOR UP TO 90 DAYS.  Dispense: 180 Tablet; Refill: 0  - carisoprodol (SOMA) 350 MG Tab; Take 1 Tablet by mouth every 8 hours as needed for Muscle Spasms for up to 30 days.  Dispense: 90 Tablet; Refill: 0  - carisoprodol (SOMA) 350 MG Tab; Take 1 Tablet by mouth 2 times a day for 60 days.  Dispense: 120 Tablet; Refill: 0    2. Spasticity    - diazepam (VALIUM) 10 MG tablet; TAKE 1 TABLET BY MOUTH EVERY 12 HOURS AS NEEDED (SPASMS) FOR UP TO 90 DAYS.  Dispense: 180 Tablet; Refill: 0  - carisoprodol (SOMA) 350 MG Tab; Take 1 Tablet by mouth every 8 hours as needed for Muscle Spasms for up to 30 days.  Dispense: 90 Tablet; Refill: 0  - carisoprodol (SOMA) 350 MG Tab; Take 1  Tablet by mouth 2 times a day for 60 days.  Dispense: 120 Tablet; Refill: 0    3. Multiple sclerosis involving brain stem (HCC)    - diazepam (VALIUM) 10 MG tablet; TAKE 1 TABLET BY MOUTH EVERY 12 HOURS AS NEEDED (SPASMS) FOR UP TO 90 DAYS.  Dispense: 180 Tablet; Refill: 0  - carisoprodol (SOMA) 350 MG Tab; Take 1 Tablet by mouth 2 times a day for 60 days.  Dispense: 120 Tablet; Refill: 0

## 2024-02-07 ENCOUNTER — TELEMEDICINE (OUTPATIENT)
Dept: MEDICAL GROUP | Facility: PHYSICIAN GROUP | Age: 69
End: 2024-02-07
Payer: MEDICARE

## 2024-02-07 VITALS — HEIGHT: 63 IN | BODY MASS INDEX: 28.34 KG/M2

## 2024-02-07 DIAGNOSIS — G35 MULTIPLE SCLEROSIS INVOLVING BRAIN STEM (HCC): ICD-10-CM

## 2024-02-07 DIAGNOSIS — R25.2 SPASTICITY: ICD-10-CM

## 2024-02-07 DIAGNOSIS — G35 MULTIPLE SCLEROSIS (HCC): ICD-10-CM

## 2024-02-07 DIAGNOSIS — M25.561 ACUTE PAIN OF RIGHT KNEE: ICD-10-CM

## 2024-02-07 PROCEDURE — 99214 OFFICE O/P EST MOD 30 MIN: CPT | Mod: 95 | Performed by: PHYSICIAN ASSISTANT

## 2024-02-07 RX ORDER — CYCLOBENZAPRINE HCL 10 MG
TABLET ORAL
COMMUNITY

## 2024-02-07 RX ORDER — TRAMADOL HYDROCHLORIDE 50 MG/1
TABLET ORAL
COMMUNITY
Start: 2023-12-27

## 2024-02-07 RX ORDER — DOCUSATE SODIUM 50MG AND SENNOSIDES 8.6MG 8.6; 5 MG/1; MG/1
1 TABLET, FILM COATED ORAL 2 TIMES DAILY
COMMUNITY

## 2024-02-07 RX ORDER — CARISOPRODOL 350 MG/1
350 TABLET ORAL
Qty: 120 TABLET | Refills: 0 | Status: SHIPPED | OUTPATIENT
Start: 2024-02-07 | End: 2024-04-07

## 2024-02-07 RX ORDER — OXYCODONE HYDROCHLORIDE 10 MG/1
TABLET ORAL
COMMUNITY

## 2024-02-07 RX ORDER — OXYCODONE HYDROCHLORIDE 5 MG/1
TABLET ORAL
COMMUNITY

## 2024-02-07 RX ORDER — CELECOXIB 200 MG/1
CAPSULE ORAL
COMMUNITY

## 2024-02-07 RX ORDER — ONDANSETRON 4 MG/1
TABLET, ORALLY DISINTEGRATING ORAL
COMMUNITY

## 2024-02-07 RX ORDER — PREGABALIN 50 MG/1
CAPSULE ORAL
COMMUNITY

## 2024-02-07 RX ORDER — ASPIRIN 325 MG
TABLET ORAL
COMMUNITY
Start: 2023-12-09

## 2024-02-07 ASSESSMENT — PATIENT HEALTH QUESTIONNAIRE - PHQ9: CLINICAL INTERPRETATION OF PHQ2 SCORE: 0

## 2024-02-07 NOTE — PROGRESS NOTES
Telemedicine Visit: Established Patient     This encounter was conducted via Zoom using encrypted video.  Verbal consent was obtained. Patient's identity was verified. The patient was home in [place].     CC:  Chief Complaint   Patient presents with    Med Change Request    Other     Right knee swelling       HISTORY OF PRESENT ILLNESS: Patient is a 68 y.o. female established patient presenting with issues below.  Pt had R TKA and is having to break up scar tissue. Her physical therapist is working hard to increase her ROM. Having a lot of swelling in her knee.  Requesting her Soma dose to be reduced back to BID instead of TID.    Patient Active Problem List    Diagnosis Date Noted    Complex tear of medial meniscus of right knee as current injury 10/05/2023    Acquired hypothyroidism 05/29/2018    Seasonal allergic rhinitis due to pollen 05/29/2018    Nocturnal hypoxia 05/21/2018    Gait instability 09/25/2017    Neurogenic bladder 09/25/2017    Spasticity 09/25/2017    Dyslipidemia 07/06/2017    Vitamin D deficiency 07/06/2017    Multiple sclerosis involving brain stem (HCC) 08/01/1995      Allergies:Bee venom, Other misc, Sulfa drugs, and Tape    Current Outpatient Medications   Medication Sig Dispense Refill    traMADol (ULTRAM) 50 MG Tab Take 1 tablet every 6 hours by oral route as needed for 7 days.      SENEXON-S 8.6-50 MG Tab Take 1 Tablet by mouth 2 times a day.      pregabalin (LYRICA) 50 MG capsule TAKE 1 CAPSULE (50 MG) BY MOUTH 2 TIMES A DAY FOR 14 DAYS.      ondansetron (ZOFRAN ODT) 4 MG TABLET DISPERSIBLE PLACE 1 TABLET (4 MG) INSIDE CHEEK EVERY 8 HOURS AS NEEDED FOR NAUSEA FOR UP TO 7 DAYS.      cyclobenzaprine (FLEXERIL) 10 mg Tab TAKE 1 TABLET (10 MG) BY MOUTH 3 TIMES DAILY AS NEEDED (MUSCLE SPASMS) FOR UP TO 14 DAYS.      celecoxib (CELEBREX) 200 MG Cap TAKE 1 CAPSULE (200 MG) BY MOUTH ONCE DAILY FOR 14 DAYS.      aspirin (ASA) 325 MG Tab TAKE 1 TABLET (325 MG) BY MOUTH 2 TIMES A DAY FOR 40 DAYS.    "   diazepam (VALIUM) 10 MG tablet TAKE 1 TABLET BY MOUTH EVERY 12 HOURS AS NEEDED (SPASMS) FOR UP TO 90 DAYS. 180 Tablet 0    carisoprodol (SOMA) 350 MG Tab Take 1 Tablet by mouth 2 times a day for 60 days. 120 Tablet 0    levothyroxine (SYNTHROID) 175 MCG Tab TAKE 1 TABLET BY MOUTH EVERY DAY IN THE MORNING ON AN EMPTY STOMACH 90 Tablet 0    meloxicam (MOBIC) 15 MG tablet Take 1 Tablet by mouth every day. 90 Tablet 1    Cholecalciferol (VITAMIN D PO) Take 3,000 Units by mouth every day.      oxyCODONE immediate-release (ROXICODONE) 5 MG Tab TAKE 1 TABLET BY MOUTH EVERY 6 HOURS AS NEEDED FOR 7 DAYS (Patient not taking: Reported on 2024)      oxyCODONE immediate release (ROXICODONE) 10 MG immediate release tablet TAKE 1 TABLET BY MOUTH EVERY 4 HOURS AS NEEDED FOR 5 DAYS DNFU 23 (Patient not taking: Reported on 2024)       No current facility-administered medications for this visit.       Social History     Tobacco Use    Smoking status: Former     Current packs/day: 0.00     Types: Cigarettes     Quit date: 2003     Years since quittin.7    Smokeless tobacco: Never   Vaping Use    Vaping Use: Never used   Substance Use Topics    Alcohol use: No    Drug use: No     Social History     Social History Narrative    Not on file       Family History   Problem Relation Age of Onset    Autoimmune Disease Daughter         MS    Other Daughter         fibromyalgia       ROS    Exam:    Ht 1.6 m (5' 3\")  Body mass index is 28.34 kg/m².    General:  Well nourished, well developed female in NAD  Head is grossly normal.  Neck: Supple.   Pulmonary: No accessory muscle use  Skin: No apparent lesions  Neuro: Alert and oriented  Psych: normal mood and affect    Please note that this dictation was created using voice recognition software. I have made every reasonable attempt to correct obvious errors, but I expect that there are errors of grammar and possibly content that I did not discover before finalizing the " note.      Assessment/Plan:  1. Multiple sclerosis (HCC)  PDMP checked. Refill sent in and twice daily dosing  - carisoprodol (SOMA) 350 MG Tab; Take 1 Tablet by mouth 2 times a day for 60 days.  Dispense: 120 Tablet; Refill: 0    2. Spasticity    - carisoprodol (SOMA) 350 MG Tab; Take 1 Tablet by mouth 2 times a day for 60 days.  Dispense: 120 Tablet; Refill: 0    3. Multiple sclerosis involving brain stem (HCC)    - carisoprodol (SOMA) 350 MG Tab; Take 1 Tablet by mouth 2 times a day for 60 days.  Dispense: 120 Tablet; Refill: 0    4. Acute pain of right knee  Reassurance. She is making progress with scar tissue. Should improve with time

## 2024-03-15 DIAGNOSIS — E03.9 ACQUIRED HYPOTHYROIDISM: ICD-10-CM

## 2024-03-18 RX ORDER — LEVOTHYROXINE SODIUM 175 UG/1
175 TABLET ORAL
Qty: 90 TABLET | Refills: 0 | Status: SHIPPED | OUTPATIENT
Start: 2024-03-18

## 2024-03-18 NOTE — TELEPHONE ENCOUNTER
Received request via: Pharmacy    Was the patient seen in the last year in this department? Yes    Does the patient have an active prescription (recently filled or refills available) for medication(s) requested? No    Pharmacy Name: [facility]     Does the patient have group home Plus and need 100 day supply (blood pressure, diabetes and cholesterol meds only)? Patient does not have SCP

## 2024-04-04 ENCOUNTER — APPOINTMENT (OUTPATIENT)
Dept: MEDICAL GROUP | Facility: PHYSICIAN GROUP | Age: 69
End: 2024-04-04
Payer: MEDICARE

## 2024-04-04 DIAGNOSIS — E78.5 DYSLIPIDEMIA: ICD-10-CM

## 2024-04-04 DIAGNOSIS — E03.9 ACQUIRED HYPOTHYROIDISM: ICD-10-CM

## 2024-04-04 DIAGNOSIS — R73.09 ELEVATED GLUCOSE: ICD-10-CM

## 2024-04-04 DIAGNOSIS — R25.2 SPASTICITY: ICD-10-CM

## 2024-04-04 DIAGNOSIS — G35 MULTIPLE SCLEROSIS INVOLVING BRAIN STEM (HCC): ICD-10-CM

## 2024-04-04 DIAGNOSIS — G35 MULTIPLE SCLEROSIS (HCC): ICD-10-CM

## 2024-04-04 PROBLEM — Z01.818 PREOPERATIVE EXAMINATION: Status: ACTIVE | Noted: 2024-04-04

## 2024-04-04 PROCEDURE — 99214 OFFICE O/P EST MOD 30 MIN: CPT | Mod: 95 | Performed by: PHYSICIAN ASSISTANT

## 2024-04-04 RX ORDER — ACETAMINOPHEN 500 MG
TABLET ORAL
COMMUNITY
End: 2024-04-04

## 2024-04-04 RX ORDER — CARISOPRODOL 350 MG/1
350 TABLET ORAL
Qty: 180 TABLET | Refills: 0 | Status: SHIPPED | OUTPATIENT
Start: 2024-04-04 | End: 2024-07-03

## 2024-04-04 RX ORDER — DIAZEPAM 10 MG/1
10 TABLET ORAL
Qty: 180 TABLET | Refills: 0 | Status: SHIPPED | OUTPATIENT
Start: 2024-04-04 | End: 2024-07-03

## 2024-04-04 RX ORDER — CEPHALEXIN 500 MG/1
CAPSULE ORAL
COMMUNITY
End: 2024-04-04

## 2024-04-04 RX ORDER — LEVOTHYROXINE SODIUM 0.15 MG/1
150 TABLET ORAL
Qty: 90 TABLET | Refills: 1 | Status: SHIPPED | OUTPATIENT
Start: 2024-04-04

## 2024-04-04 RX ORDER — DIAZEPAM 10 MG/1
10 TABLET ORAL
COMMUNITY
End: 2024-04-04 | Stop reason: SDUPTHER

## 2024-04-04 NOTE — PROGRESS NOTES
Telemedicine Visit: Established Patient     This encounter was conducted via Zoom using encrypted video.  Verbal consent was obtained. Patient's identity was verified. The patient was in private location in [place].     CC:  Chief Complaint   Patient presents with    Medication Refill       HISTORY OF PRESENT ILLNESS: Patient is a 68 y.o. female established patient presenting with issues below.  Pt feeling like levothyroxine 175mcg is too high for her. Would like to lower dose again to 150mcg. Feels like she is going crazy.   Pt needing refill of her Soma and Valium for her spastic MS.    Patient Active Problem List    Diagnosis Date Noted    Complex tear of medial meniscus of right knee as current injury 10/05/2023    Acquired hypothyroidism 05/29/2018    Seasonal allergic rhinitis due to pollen 05/29/2018    Nocturnal hypoxia 05/21/2018    Gait instability 09/25/2017    Neurogenic bladder 09/25/2017    Spasticity 09/25/2017    Dyslipidemia 07/06/2017    Vitamin D deficiency 07/06/2017    Multiple sclerosis involving brain stem (HCC) 08/01/1995      Allergies:Bee venom, Other misc, Sulfa drugs, and Tape    Current Outpatient Medications   Medication Sig Dispense Refill    diazepam (VALIUM) 10 MG tablet Take 10 mg by mouth.      levothyroxine (SYNTHROID) 175 MCG Tab TAKE 1 TABLET BY MOUTH EVERY DAY IN THE MORNING ON AN EMPTY STOMACH 90 Tablet 0    SENEXON-S 8.6-50 MG Tab Take 1 Tablet by mouth 2 times a day.      ondansetron (ZOFRAN ODT) 4 MG TABLET DISPERSIBLE PLACE 1 TABLET (4 MG) INSIDE CHEEK EVERY 8 HOURS AS NEEDED FOR NAUSEA FOR UP TO 7 DAYS.      aspirin (ASA) 325 MG Tab TAKE 1 TABLET (325 MG) BY MOUTH 2 TIMES A DAY FOR 40 DAYS.      carisoprodol (SOMA) 350 MG Tab Take 1 Tablet by mouth 2 times a day for 60 days. 120 Tablet 0    meloxicam (MOBIC) 15 MG tablet Take 1 Tablet by mouth every day. 90 Tablet 1    Cholecalciferol (VITAMIN D PO) Take 3,000 Units by mouth every day.       No current  facility-administered medications for this visit.       Social History     Tobacco Use    Smoking status: Former     Current packs/day: 0.00     Types: Cigarettes     Quit date: 2003     Years since quittin.8    Smokeless tobacco: Never   Vaping Use    Vaping Use: Never used   Substance Use Topics    Alcohol use: No    Drug use: No     Social History     Social History Narrative    Not on file       Family History   Problem Relation Age of Onset    Autoimmune Disease Daughter         MS    Other Daughter         fibromyalgia       ROS    Exam:    There were no vitals taken for this visit. There is no height or weight on file to calculate BMI.    General:  Well nourished, well developed female in NAD  Head is grossly normal.  Neck: Supple.   Pulmonary: No accessory muscle use  Skin: No apparent lesions  Neuro: Alert and oriented  Psych: normal mood and affect    Please note that this dictation was created using voice recognition software. I have made every reasonable attempt to correct obvious errors, but I expect that there are errors of grammar and possibly content that I did not discover before finalizing the note.      Assessment/Plan:  1. Multiple sclerosis (HCC)  PDMP checked.  Refill sent in, continue Soma 350 mg and Valium 10 mg.  - CBC WITH DIFFERENTIAL; Future  - Comp Metabolic Panel; Future  - carisoprodol (SOMA) 350 MG Tab; Take 1 Tablet by mouth 2 times a day for 90 days.  Dispense: 180 Tablet; Refill: 0  - diazepam (VALIUM) 10 MG tablet; Take 1 Tablet by mouth 2 times a day for 90 days.  Dispense: 180 Tablet; Refill: 0    2. Spasticity    - carisoprodol (SOMA) 350 MG Tab; Take 1 Tablet by mouth 2 times a day for 90 days.  Dispense: 180 Tablet; Refill: 0  - diazepam (VALIUM) 10 MG tablet; Take 1 Tablet by mouth 2 times a day for 90 days.  Dispense: 180 Tablet; Refill: 0    3. Multiple sclerosis involving brain stem (HCC)    - carisoprodol (SOMA) 350 MG Tab; Take 1 Tablet by mouth 2 times a day  for 90 days.  Dispense: 180 Tablet; Refill: 0    4. Acquired hypothyroidism  Reduce dose of levothyroxine to 150 mcg and recheck TSH in 3 months  - TSH WITH REFLEX TO FT4; Future  - levothyroxine (SYNTHROID) 150 MCG Tab; Take 1 Tablet by mouth every morning on an empty stomach.  Dispense: 90 Tablet; Refill: 1    5. Elevated glucose    - HEMOGLOBIN A1C; Future    6. Dyslipidemia    - CBC WITH DIFFERENTIAL; Future  - Comp Metabolic Panel; Future  - Lipid Profile; Future

## 2024-06-21 ENCOUNTER — PATIENT MESSAGE (OUTPATIENT)
Dept: MEDICAL GROUP | Facility: PHYSICIAN GROUP | Age: 69
End: 2024-06-21
Payer: MEDICARE

## 2024-06-21 DIAGNOSIS — R53.83 OTHER FATIGUE: ICD-10-CM

## 2024-07-03 ENCOUNTER — APPOINTMENT (OUTPATIENT)
Dept: MEDICAL GROUP | Facility: PHYSICIAN GROUP | Age: 69
End: 2024-07-03
Payer: MEDICARE

## 2024-07-03 VITALS
HEIGHT: 63 IN | DIASTOLIC BLOOD PRESSURE: 60 MMHG | BODY MASS INDEX: 31.54 KG/M2 | SYSTOLIC BLOOD PRESSURE: 110 MMHG | HEART RATE: 72 BPM | OXYGEN SATURATION: 95 % | TEMPERATURE: 98.6 F | WEIGHT: 178 LBS

## 2024-07-03 PROCEDURE — 99999 PR NO CHARGE: CPT | Performed by: PHYSICIAN ASSISTANT

## 2024-07-03 RX ORDER — CELECOXIB 200 MG/1
200 CAPSULE ORAL 2 TIMES DAILY
COMMUNITY
Start: 2024-05-14 | End: 2024-07-03

## 2024-07-03 RX ORDER — TRAMADOL HYDROCHLORIDE 50 MG/1
TABLET ORAL
COMMUNITY
End: 2024-07-03

## 2024-07-03 RX ORDER — ASPIRIN 325 MG
325 TABLET ORAL
COMMUNITY
End: 2024-07-03

## 2024-07-03 ASSESSMENT — FIBROSIS 4 INDEX: FIB4 SCORE: 0.65

## 2024-07-08 ENCOUNTER — OFFICE VISIT (OUTPATIENT)
Dept: MEDICAL GROUP | Facility: PHYSICIAN GROUP | Age: 69
End: 2024-07-08
Payer: MEDICARE

## 2024-07-08 ENCOUNTER — HOSPITAL ENCOUNTER (OUTPATIENT)
Facility: MEDICAL CENTER | Age: 69
End: 2024-07-08
Attending: PHYSICIAN ASSISTANT
Payer: MEDICARE

## 2024-07-08 VITALS
HEART RATE: 75 BPM | WEIGHT: 175 LBS | OXYGEN SATURATION: 95 % | BODY MASS INDEX: 31.01 KG/M2 | RESPIRATION RATE: 12 BRPM | DIASTOLIC BLOOD PRESSURE: 70 MMHG | SYSTOLIC BLOOD PRESSURE: 110 MMHG | TEMPERATURE: 97.3 F | HEIGHT: 63 IN

## 2024-07-08 DIAGNOSIS — M25.461 PAIN AND SWELLING OF RIGHT KNEE: ICD-10-CM

## 2024-07-08 DIAGNOSIS — G35 MULTIPLE SCLEROSIS (HCC): ICD-10-CM

## 2024-07-08 DIAGNOSIS — M25.561 PAIN AND SWELLING OF RIGHT KNEE: ICD-10-CM

## 2024-07-08 PROCEDURE — 80307 DRUG TEST PRSMV CHEM ANLYZR: CPT

## 2024-07-08 PROCEDURE — 3078F DIAST BP <80 MM HG: CPT | Performed by: PHYSICIAN ASSISTANT

## 2024-07-08 PROCEDURE — G0480 DRUG TEST DEF 1-7 CLASSES: HCPCS

## 2024-07-08 PROCEDURE — 3074F SYST BP LT 130 MM HG: CPT | Performed by: PHYSICIAN ASSISTANT

## 2024-07-08 PROCEDURE — 99214 OFFICE O/P EST MOD 30 MIN: CPT | Performed by: PHYSICIAN ASSISTANT

## 2024-07-08 RX ORDER — LEVOTHYROXINE SODIUM 175 UG/1
175 TABLET ORAL DAILY
COMMUNITY
End: 2024-07-08

## 2024-07-08 RX ORDER — CELECOXIB 200 MG/1
1 CAPSULE ORAL 2 TIMES DAILY
COMMUNITY

## 2024-07-08 RX ORDER — LEVOTHYROXINE SODIUM 0.15 MG/1
1 TABLET ORAL
COMMUNITY
End: 2024-07-08

## 2024-07-08 RX ORDER — HYDROCODONE BITARTRATE AND ACETAMINOPHEN 5; 325 MG/1; MG/1
1 TABLET ORAL EVERY 8 HOURS PRN
Qty: 90 TABLET | Refills: 0 | Status: SHIPPED | OUTPATIENT
Start: 2024-07-08 | End: 2024-08-07

## 2024-07-08 RX ORDER — CARISOPRODOL 350 MG/1
350 TABLET ORAL 4 TIMES DAILY
Qty: 360 TABLET | Refills: 0 | Status: SHIPPED | OUTPATIENT
Start: 2024-07-08 | End: 2024-10-06

## 2024-07-08 RX ORDER — CARISOPRODOL 350 MG/1
350 TABLET ORAL 4 TIMES DAILY
COMMUNITY
End: 2024-07-08 | Stop reason: SDUPTHER

## 2024-07-08 RX ORDER — DIAZEPAM 10 MG/1
1 TABLET ORAL 2 TIMES DAILY
COMMUNITY

## 2024-07-08 ASSESSMENT — FIBROSIS 4 INDEX: FIB4 SCORE: 0.65

## 2024-07-12 LAB
AMPHET CTO UR CFM-MCNC: NEGATIVE NG/ML
BARBITURATES CTO UR CFM-MCNC: NEGATIVE NG/ML
BENZODIAZ CTO UR CFM-MCNC: NORMAL NG/ML
BUPRENORPHINE UR-MCNC: NEGATIVE NG/ML
CANNABINOIDS CTO UR CFM-MCNC: NEGATIVE NG/ML
CARISOPRODOL UR-MCNC: NORMAL NG/ML
COCAINE CTO UR CFM-MCNC: NEGATIVE NG/ML
CREAT UR-MCNC: 34.7 MG/DL (ref 20–400)
DRUG SCREEN COMMENT UR-IMP: NORMAL
ETHYL GLUCURONIDE UR QL SCN: NEGATIVE NG/ML
FENTANYL UR-MCNC: NEGATIVE NG/ML
MEPERIDINE CTO UR SCN-MCNC: NEGATIVE NG/ML
METHADONE CTO UR CFM-MCNC: NEGATIVE NG/ML
OPIATES UR QL SCN: NEGATIVE NG/ML
OXYCDOXYM URSCRN 2005102: NEGATIVE NG/ML
PCP CTO UR CFM-MCNC: NEGATIVE NG/ML
PROPOXYPH CTO UR CFM-MCNC: NEGATIVE NG/ML
TAPENTADOL UR-MCNC: NEGATIVE NG/ML
TRAMADOL CTO UR SCN-MCNC: NEGATIVE NG/ML
ZOLPIDEM UR-MCNC: NEGATIVE NG/ML

## 2024-07-16 LAB
1OH-MIDAZOLAM UR CFM-MCNC: <20 NG/ML
7AMINOCLONAZEPAM UR CFM-MCNC: <5 NG/ML
A-OH ALPRAZ UR CFM-MCNC: <5 NG/ML
ALPRAZ UR CFM-MCNC: <5 NG/ML
CHLORDIAZEP UR CFM-MCNC: <20 NG/ML
CLONAZEPAM UR CFM-MCNC: <5 NG/ML
DIAZEPAM UR CFM-MCNC: <20 NG/ML
LORAZEPAM UR CFM-MCNC: <20 NG/ML
MIDAZOLAM UR CFM-MCNC: <20 NG/ML
NORDIAZEPAM UR CFM-MCNC: 238 NG/ML
OXAZEPAM UR CFM-MCNC: 701 NG/ML
TEMAZEPAM UR CFM-MCNC: 604 NG/ML

## 2024-07-16 PROCEDURE — G0480 DRUG TEST DEF 1-7 CLASSES: HCPCS

## 2024-07-19 LAB
CARISOPRODOL UR-MCNC: <100 NG/ML
MEPROBAMATE UR-MCNC: NORMAL NG/ML

## 2024-08-23 LAB
1OH-MIDAZOLAM UR CFM-MCNC: <20 NG/ML
7AMINOCLONAZEPAM UR CFM-MCNC: <5 NG/ML
A-OH ALPRAZ UR CFM-MCNC: <5 NG/ML
ALPRAZ UR CFM-MCNC: <5 NG/ML
CARISOPRODOL UR-MCNC: <100 NG/ML
CHLORDIAZEP UR CFM-MCNC: <20 NG/ML
CLONAZEPAM UR CFM-MCNC: <5 NG/ML
DIAZEPAM UR CFM-MCNC: <20 NG/ML
LORAZEPAM UR CFM-MCNC: <20 NG/ML
MEPROBAMATE UR-MCNC: NORMAL NG/ML
MIDAZOLAM UR CFM-MCNC: <20 NG/ML
NORDIAZEPAM UR CFM-MCNC: 238 NG/ML
OXAZEPAM UR CFM-MCNC: 701 NG/ML
TEMAZEPAM UR CFM-MCNC: 604 NG/ML

## 2024-10-04 DIAGNOSIS — E03.9 ACQUIRED HYPOTHYROIDISM: ICD-10-CM

## 2024-10-07 RX ORDER — LEVOTHYROXINE SODIUM 150 UG/1
150 TABLET ORAL
Qty: 90 TABLET | Refills: 1 | Status: SHIPPED | OUTPATIENT
Start: 2024-10-07

## 2024-10-08 ENCOUNTER — APPOINTMENT (OUTPATIENT)
Dept: MEDICAL GROUP | Facility: PHYSICIAN GROUP | Age: 69
End: 2024-10-08
Payer: MEDICARE

## 2024-10-08 VITALS
SYSTOLIC BLOOD PRESSURE: 104 MMHG | HEIGHT: 63 IN | WEIGHT: 182 LBS | RESPIRATION RATE: 12 BRPM | DIASTOLIC BLOOD PRESSURE: 80 MMHG | HEART RATE: 77 BPM | BODY MASS INDEX: 32.25 KG/M2 | TEMPERATURE: 97.1 F | OXYGEN SATURATION: 97 %

## 2024-10-08 DIAGNOSIS — G35 MULTIPLE SCLEROSIS INVOLVING BRAIN STEM (HCC): ICD-10-CM

## 2024-10-08 DIAGNOSIS — I89.0 LYMPHEDEMA: ICD-10-CM

## 2024-10-08 PROCEDURE — 99214 OFFICE O/P EST MOD 30 MIN: CPT | Performed by: PHYSICIAN ASSISTANT

## 2024-10-08 PROCEDURE — 3079F DIAST BP 80-89 MM HG: CPT | Performed by: PHYSICIAN ASSISTANT

## 2024-10-08 PROCEDURE — 3074F SYST BP LT 130 MM HG: CPT | Performed by: PHYSICIAN ASSISTANT

## 2024-10-08 RX ORDER — CARISOPRODOL 350 MG/1
350 TABLET ORAL 4 TIMES DAILY
Qty: 360 TABLET | Refills: 0 | Status: SHIPPED | OUTPATIENT
Start: 2024-10-08 | End: 2025-01-06

## 2024-10-08 RX ORDER — DIAZEPAM 10 MG/1
10 TABLET ORAL DAILY
Qty: 90 TABLET | Refills: 0 | Status: SHIPPED | OUTPATIENT
Start: 2024-10-08 | End: 2025-01-06

## 2024-10-08 ASSESSMENT — FIBROSIS 4 INDEX: FIB4 SCORE: 0.65

## 2025-03-18 ENCOUNTER — OFFICE VISIT (OUTPATIENT)
Dept: MEDICAL GROUP | Facility: PHYSICIAN GROUP | Age: 70
End: 2025-03-18
Payer: MEDICARE

## 2025-03-18 ENCOUNTER — APPOINTMENT (OUTPATIENT)
Dept: MEDICAL GROUP | Facility: PHYSICIAN GROUP | Age: 70
End: 2025-03-18
Payer: MEDICARE

## 2025-03-18 VITALS
SYSTOLIC BLOOD PRESSURE: 108 MMHG | DIASTOLIC BLOOD PRESSURE: 64 MMHG | HEART RATE: 89 BPM | TEMPERATURE: 97 F | RESPIRATION RATE: 16 BRPM | HEIGHT: 63 IN | OXYGEN SATURATION: 95 % | WEIGHT: 188 LBS | BODY MASS INDEX: 33.31 KG/M2

## 2025-03-18 DIAGNOSIS — G35 MULTIPLE SCLEROSIS INVOLVING BRAIN STEM (HCC): ICD-10-CM

## 2025-03-18 DIAGNOSIS — R73.03 PREDIABETES: ICD-10-CM

## 2025-03-18 DIAGNOSIS — E03.9 ACQUIRED HYPOTHYROIDISM: ICD-10-CM

## 2025-03-18 DIAGNOSIS — E78.5 DYSLIPIDEMIA: ICD-10-CM

## 2025-03-18 PROCEDURE — 99213 OFFICE O/P EST LOW 20 MIN: CPT | Performed by: PHYSICIAN ASSISTANT

## 2025-03-18 PROCEDURE — 3078F DIAST BP <80 MM HG: CPT | Performed by: PHYSICIAN ASSISTANT

## 2025-03-18 PROCEDURE — 3074F SYST BP LT 130 MM HG: CPT | Performed by: PHYSICIAN ASSISTANT

## 2025-03-18 RX ORDER — DIAZEPAM 10 MG/1
10 TABLET ORAL
Qty: 180 TABLET | Refills: 0 | Status: SHIPPED | OUTPATIENT
Start: 2025-03-18 | End: 2025-06-16

## 2025-03-18 RX ORDER — CARISOPRODOL 350 MG/1
350 TABLET ORAL 4 TIMES DAILY
Qty: 360 TABLET | Refills: 0 | Status: SHIPPED | OUTPATIENT
Start: 2025-03-18 | End: 2025-06-16

## 2025-03-18 ASSESSMENT — FIBROSIS 4 INDEX: FIB4 SCORE: 0.66

## 2025-03-18 NOTE — PROGRESS NOTES
"CC:  Chief Complaint   Patient presents with    Medication Refill     Valium, soma        HISTORY OF PRESENT ILLNESS: Patient is a 69 y.o. female established patient presenting with issues below  Pt had extreme vertigo about two months ago. Had been ongoing for 6 weeks.   Pt needing refill of her soma and valium.     Current Outpatient Medications   Medication Sig Dispense Refill    levothyroxine (SYNTHROID) 150 MCG Tab TAKE 1 TABLET BY MOUTH EVERY DAY IN THE MORNING ON AN EMPTY STOMACH 90 Tablet 1    Cholecalciferol (VITAMIN D PO) Take 3,000 Units by mouth every day.       No current facility-administered medications for this visit.        ROS:     ROS    Exam:    /64   Pulse 89   Temp 36.1 °C (97 °F) (Temporal)   Resp 16   Ht 1.6 m (5' 3\")   Wt 85.3 kg (188 lb)   SpO2 95%  Body mass index is 33.3 kg/m².    General:  Well nourished, well developed female in NAD  Head is grossly normal.  Neck: Supple.   Skin: Warm and dry. No obvious lesions  Neuro: Normal muscle tone. Gait normal. Alert and oriented.  Psych: Normal mood and affect      Please note that this dictation was created using voice recognition software. I have made every reasonable attempt to correct obvious errors, but I expect that there are errors of grammar and possibly content that I did not discover before finalizing the note.        Assessment/Plan:    1. Multiple sclerosis involving brain stem (HCC)  PDMP checked.  Refill of Valium and Soma sent in.  - Controlled Substance Treatment Agreement  - diazepam (VALIUM) 10 MG tablet; Take 1 Tablet by mouth 2 times a day for 90 days.  Dispense: 180 Tablet; Refill: 0  - carisoprodol (SOMA) 350 MG Tab; Take 1 Tablet by mouth 4 times a day for 90 days.  Dispense: 360 Tablet; Refill: 0    2. Acquired hypothyroidism    - TSH WITH REFLEX TO FT4; Future    3. Dyslipidemia    - CBC WITH DIFFERENTIAL; Future  - Comp Metabolic Panel; Future  - Lipid Profile; Future    4. Prediabetes    - HEMOGLOBIN A1C; " Future

## 2025-04-09 DIAGNOSIS — E03.9 ACQUIRED HYPOTHYROIDISM: ICD-10-CM

## 2025-04-09 RX ORDER — LEVOTHYROXINE SODIUM 150 UG/1
150 TABLET ORAL
Qty: 90 TABLET | Refills: 1 | Status: SHIPPED | OUTPATIENT
Start: 2025-04-09

## 2025-04-09 NOTE — TELEPHONE ENCOUNTER
Received request via: Pharmacy    Was the patient seen in the last year in this department? Yes    Does the patient have an active prescription (recently filled or refills available) for medication(s) requested? No    Pharmacy Name:  [facility]/pharmacy #[id number] - [place], [place] - [address]      Does the patient have prison Plus and need 100-day supply? (This applies to ALL medications) Patient does not have SCP

## 2025-09-02 ENCOUNTER — APPOINTMENT (OUTPATIENT)
Dept: MEDICAL GROUP | Facility: PHYSICIAN GROUP | Age: 70
End: 2025-09-02
Payer: MEDICARE

## (undated) DEVICE — PEN SKIN MARKER W/RULER - (50EA/BX)

## (undated) DEVICE — GLOVE BIOGEL SZ 8 SURGICAL PF LTX - (50PR/BX 4BX/CA)

## (undated) DEVICE — SUTURE GENERAL

## (undated) DEVICE — SLEEVE, VASO, THIGH, MED

## (undated) DEVICE — TUBING C&T SET FLYING LEADS DRAIN TUBING (10EA/BX)

## (undated) DEVICE — TOWELS CLOTH SURGICAL - (4/PK 20PK/CA)

## (undated) DEVICE — SODIUM CHL IRRIGATION 0.9% 1000ML (12EA/CA)

## (undated) DEVICE — PATTIES SURG X-RAYCOTTONOID - 1/2 X 3 IN (200/CA)

## (undated) DEVICE — KIT  I.V. START (100EA/CA)

## (undated) DEVICE — HEMOSTAT SURG ABSORBABLE - 2 X 3 IN SURGICEL (24EA/CA)

## (undated) DEVICE — ARMREST CRADLE FOAM - (2PR/PK 12PR/CA)

## (undated) DEVICE — CANISTER SUCTION 3000ML MECHANICAL FILTER AUTO SHUTOFF MEDI-VAC NONSTERILE LF DISP  (40EA/CA)

## (undated) DEVICE — ELECTRODE DUAL RETURN W/ CORD - (50/PK)

## (undated) DEVICE — SEALER BIPOLAR 2.3 AQUAMANTYS

## (undated) DEVICE — DRAPE STRLE REG TOWEL 18X24 - (10/BX 4BX/CA)"

## (undated) DEVICE — GOWN SURGICAL XX-LARGE - (28EA/CA) SIRUS NON REINFORCED

## (undated) DEVICE — DRESSING TRANSPARENT FILM TEGADERM 4 X 4.75" (50EA/BX)"

## (undated) DEVICE — KIT ROOM DECONTAMINATION

## (undated) DEVICE — SUTURE 2-0 ETHILON FS - (36/BX) 18 INCH

## (undated) DEVICE — TUBING CLEARLINK DUO-VENT - C-FLO (48EA/CA)

## (undated) DEVICE — SODIUM CHL. INJ. 0.9% 500ML (24EA/CA 50CA/PF)

## (undated) DEVICE — CUFF BP ADULT MEDIUM DISPOSABLE (20EA/CA)

## (undated) DEVICE — MIDAS LUBRICATOR DIFFUSER PACK (4EA/CA)

## (undated) DEVICE — SUCTION INSTRUMENT YANKAUER BULBOUS TIP W/O VENT (50EA/CA)

## (undated) DEVICE — DRAPE LAPAROTOMY T SHEET - (12EA/CA)

## (undated) DEVICE — HEADREST PRONEVIEW LARGE - (10/CA)

## (undated) DEVICE — MASK ANESTHESIA ADULT  - (100/CA)

## (undated) DEVICE — INTRAOP NEURO IN OR 1:1 PER 15 MIN

## (undated) DEVICE — GLOVE BIOGEL INDICATOR SZ 8.5 SURGICAL PF LTX - (50/BX 4BX/CA)

## (undated) DEVICE — NEPTUNE 4 PORT MANIFOLD - (20/PK)

## (undated) DEVICE — ELECTRODE 850 FOAM ADHESIVE - HYDROGEL RADIOTRNSPRNT (50/PK)

## (undated) DEVICE — RESERVOIR SUCTION 100 CC - SILICONE (20EA/CA)

## (undated) DEVICE — SYRINGE 30 ML LL (56/BX)

## (undated) DEVICE — DRAIN FLAT SUCTION 10MMX20CM - LATEX FREE (10EA/CA)

## (undated) DEVICE — SUTURE 2-0 VICRYL PLUS CT-1 - 8 X 18 INCH(12/BX)

## (undated) DEVICE — PROTECTOR ULNA NERVE - (36PR/CA)

## (undated) DEVICE — SYRINGE SAFETY 10 ML 18 GA X 1 1/2 BLUNT LL (100/BX 4BX/CA)

## (undated) DEVICE — STERI STRIP COMPOUND BENZOIN - TINCTURE 0.6ML WITH APPLICATOR (40EA/BX)

## (undated) DEVICE — PACK NEURO - (2EA/CA)

## (undated) DEVICE — CLOSURE SKIN STRIP 1/2 X 4 IN - (STERI STRIP) (50/BX 4BX/CA)

## (undated) DEVICE — LACTATED RINGERS INJ. 500 ML - (24EA/CA)

## (undated) DEVICE — CHLORAPREP 26 ML APPLICATOR - ORANGE TINT(25/CA)

## (undated) DEVICE — KIT ANESTHESIA W/CIRCUIT & 3/LT BAG W/FILTER (20EA/CA)

## (undated) DEVICE — GOWN WARMING STANDARD FLEX - (30/CA)

## (undated) DEVICE — GLOVE BIOGEL PI INDICATOR SZ 8.0 SURGICAL PF LF -(50/BX 4BX/CA)

## (undated) DEVICE — SOD CHL INJ 20 CC - (25/BX 4BX/CS)

## (undated) DEVICE — TUBE E-T HI-LO CUFF 7.0MM (10EA/PK)

## (undated) DEVICE — SPONGE GAUZESTER 4 X 4 4PLY - (128PK/CA)

## (undated) DEVICE — BOVIE BLADE COATED &INSULATED - 25/PK

## (undated) DEVICE — SURGIFOAM (12X7) - (12EA/CA)

## (undated) DEVICE — GLOVE BIOGEL PI ORTHO SZ 7.5 PF LF (40PR/BX)

## (undated) DEVICE — DRESSING TRANSPARENT FILM TEGADERM 2.375 X 2.75"  (100EA/BX)"

## (undated) DEVICE — SET LEADWIRE 5 LEAD BEDSIDE DISPOSABLE ECG (1SET OF 5/EA)

## (undated) DEVICE — LACTATED RINGERS INJ 1000 ML - (14EA/CA 60CA/PF)

## (undated) DEVICE — NEEDLE SAFETY 18 GA X 1 1/2 IN (100EA/BX)

## (undated) DEVICE — TOOL DISSECT MATCH HEAD

## (undated) DEVICE — SUTURE 0 VICRYL PLUS CT-1 - 8 X 18 INCH (12/BX)

## (undated) DEVICE — KIT SURGIFLO W/OUT THROMBIN - (6EA/CA)

## (undated) DEVICE — SENSOR SPO2 NEO LNCS ADHESIVE (20/BX) SEE USER NOTES

## (undated) DEVICE — KIT EVACUATER 3 SPRING PVC LF 1/8 DRAIN SIZE (10EA/CA)"

## (undated) DEVICE — TRAY CATHETER FOLEY URINE METER W/STATLOCK 350ML (10EA/CA)